# Patient Record
Sex: MALE | Race: BLACK OR AFRICAN AMERICAN | NOT HISPANIC OR LATINO | Employment: OTHER | URBAN - METROPOLITAN AREA
[De-identification: names, ages, dates, MRNs, and addresses within clinical notes are randomized per-mention and may not be internally consistent; named-entity substitution may affect disease eponyms.]

---

## 2023-10-06 PROBLEM — R76.8 HEPATITIS C ANTIBODY TEST POSITIVE: Status: ACTIVE | Noted: 2023-10-06

## 2023-10-06 PROBLEM — F20.9 SCHIZOPHRENIA: Status: ACTIVE | Noted: 2023-10-06

## 2023-10-06 PROBLEM — Z72.0 TOBACCO ABUSE: Status: ACTIVE | Noted: 2023-10-06

## 2023-10-07 PROBLEM — I11.9 LVH (LEFT VENTRICULAR HYPERTROPHY) DUE TO HYPERTENSIVE DISEASE, WITHOUT HEART FAILURE: Status: ACTIVE | Noted: 2023-10-07

## 2024-02-06 PROBLEM — E87.4 RESPIRATORY ACIDOSIS WITH METABOLIC ACIDOSIS: Status: ACTIVE | Noted: 2024-02-06

## 2024-02-06 PROBLEM — N17.9 AKI (ACUTE KIDNEY INJURY): Status: ACTIVE | Noted: 2024-02-06

## 2024-02-06 PROBLEM — R79.89 ELEVATED TROPONIN: Status: ACTIVE | Noted: 2024-02-06

## 2024-02-06 PROBLEM — G93.40 ACUTE ENCEPHALOPATHY: Status: ACTIVE | Noted: 2024-02-06

## 2024-02-06 PROBLEM — R79.89 ELEVATED LACTIC ACID LEVEL: Status: ACTIVE | Noted: 2024-02-06

## 2024-02-06 PROBLEM — J96.02 ACUTE RESPIRATORY FAILURE WITH HYPERCAPNIA: Status: ACTIVE | Noted: 2024-02-06

## 2024-02-06 PROBLEM — Z99.11 ON MECHANICALLY ASSISTED VENTILATION: Status: ACTIVE | Noted: 2024-02-06

## 2024-02-07 ENCOUNTER — DOCUMENTATION ONLY (OUTPATIENT)
Dept: NEUROLOGY | Facility: CLINIC | Age: 44
End: 2024-02-07
Payer: MEDICAID

## 2024-02-07 ENCOUNTER — HOSPITAL ENCOUNTER (INPATIENT)
Facility: HOSPITAL | Age: 44
LOS: 6 days | Discharge: HOME | DRG: 091 | End: 2024-02-13
Attending: PSYCHIATRY & NEUROLOGY | Admitting: PSYCHIATRY & NEUROLOGY
Payer: MEDICAID

## 2024-02-07 DIAGNOSIS — R41.82 AMS (ALTERED MENTAL STATUS): ICD-10-CM

## 2024-02-07 DIAGNOSIS — I24.9 ACUTE CORONARY SYNDROME WITH HIGH TROPONIN: ICD-10-CM

## 2024-02-07 DIAGNOSIS — F20.3 UNDIFFERENTIATED SCHIZOPHRENIA: ICD-10-CM

## 2024-02-07 DIAGNOSIS — G93.40 ACUTE ENCEPHALOPATHY: Primary | ICD-10-CM

## 2024-02-07 PROBLEM — R19.30: Status: ACTIVE | Noted: 2024-02-07

## 2024-02-07 PROBLEM — R56.9 SEIZURE: Status: ACTIVE | Noted: 2024-02-07

## 2024-02-07 PROBLEM — J96.01 ACUTE HYPOXEMIC RESPIRATORY FAILURE: Status: ACTIVE | Noted: 2024-02-07

## 2024-02-07 LAB
ABO + RH BLD: NORMAL
ALBUMIN SERPL BCP-MCNC: 3.3 G/DL (ref 3.5–5.2)
ALBUMIN SERPL BCP-MCNC: 3.7 G/DL (ref 3.5–5.2)
ALLENS TEST: ABNORMAL
ALP SERPL-CCNC: 54 U/L (ref 55–135)
ALP SERPL-CCNC: 62 U/L (ref 55–135)
ALT SERPL W/O P-5'-P-CCNC: 17 U/L (ref 10–44)
ALT SERPL W/O P-5'-P-CCNC: 21 U/L (ref 10–44)
AMPHET+METHAMPHET UR QL: NEGATIVE
ANION GAP SERPL CALC-SCNC: 10 MMOL/L (ref 8–16)
ANION GAP SERPL CALC-SCNC: 13 MMOL/L (ref 8–16)
ASCENDING AORTA: 2.72 CM
AST SERPL-CCNC: 37 U/L (ref 10–40)
AST SERPL-CCNC: 40 U/L (ref 10–40)
BACTERIA #/AREA URNS AUTO: ABNORMAL /HPF
BARBITURATES UR QL SCN>200 NG/ML: NEGATIVE
BASOPHILS # BLD AUTO: 0.03 K/UL (ref 0–0.2)
BASOPHILS NFR BLD: 0.3 % (ref 0–1.9)
BENZODIAZ UR QL SCN>200 NG/ML: ABNORMAL
BILIRUB SERPL-MCNC: 0.5 MG/DL (ref 0.1–1)
BILIRUB SERPL-MCNC: 0.6 MG/DL (ref 0.1–1)
BILIRUB UR QL STRIP: NEGATIVE
BLD GP AB SCN CELLS X3 SERPL QL: NORMAL
BSA FOR ECHO PROCEDURE: 2.3 M2
BUN SERPL-MCNC: 20 MG/DL (ref 6–20)
BUN SERPL-MCNC: 20 MG/DL (ref 6–20)
BZE UR QL SCN: NEGATIVE
CALCIUM SERPL-MCNC: 8.9 MG/DL (ref 8.7–10.5)
CALCIUM SERPL-MCNC: 8.9 MG/DL (ref 8.7–10.5)
CANNABINOIDS UR QL SCN: NEGATIVE
CHLORIDE SERPL-SCNC: 105 MMOL/L (ref 95–110)
CHLORIDE SERPL-SCNC: 107 MMOL/L (ref 95–110)
CHOLEST SERPL-MCNC: 199 MG/DL (ref 120–199)
CHOLEST/HDLC SERPL: 5.9 {RATIO} (ref 2–5)
CLARITY UR REFRACT.AUTO: ABNORMAL
CO2 SERPL-SCNC: 19 MMOL/L (ref 23–29)
CO2 SERPL-SCNC: 25 MMOL/L (ref 23–29)
COLOR UR AUTO: YELLOW
CREAT SERPL-MCNC: 1.3 MG/DL (ref 0.5–1.4)
CREAT SERPL-MCNC: 1.7 MG/DL (ref 0.5–1.4)
CREAT UR-MCNC: 167 MG/DL (ref 23–375)
CREAT UR-MCNC: 168 MG/DL (ref 23–375)
CREAT UR-MCNC: 168 MG/DL (ref 23–375)
CRP SERPL-MCNC: 17.5 MG/L (ref 0–8.2)
CV ECHO LV RWT: 0.53 CM
DELSYS: ABNORMAL
DIFFERENTIAL METHOD BLD: ABNORMAL
DOP CALC LVOT AREA: 3 CM2
DOP CALC LVOT DIAMETER: 1.95 CM
DOP CALC LVOT PEAK VEL: 0.92 M/S
DOP CALC LVOT STROKE VOLUME: 45.43 CM3
DOP CALCLVOT PEAK VEL VTI: 15.22 CM
E WAVE DECELERATION TIME: 241.58 MSEC
E/A RATIO: 1.43
E/E' RATIO: 8.35 M/S
ECHO LV POSTERIOR WALL: 1.26 CM (ref 0.6–1.1)
EJECTION FRACTION: 58 %
EOSINOPHIL # BLD AUTO: 0 K/UL (ref 0–0.5)
EOSINOPHIL NFR BLD: 0.3 % (ref 0–8)
ERYTHROCYTE [DISTWIDTH] IN BLOOD BY AUTOMATED COUNT: 13.2 % (ref 11.5–14.5)
ERYTHROCYTE [SEDIMENTATION RATE] IN BLOOD BY PHOTOMETRIC METHOD: 11 MM/HR (ref 0–23)
ERYTHROCYTE [SEDIMENTATION RATE] IN BLOOD BY WESTERGREN METHOD: 20 MM/H
EST. GFR  (NO RACE VARIABLE): 50.7 ML/MIN/1.73 M^2
EST. GFR  (NO RACE VARIABLE): >60 ML/MIN/1.73 M^2
ESTIMATED AVG GLUCOSE: 128 MG/DL (ref 68–131)
ETHANOL UR-MCNC: <10 MG/DL
FENTANYL UR QL SCN: ABNORMAL
FIO2: 40
FRACTIONAL SHORTENING: 31 % (ref 28–44)
GLUCOSE SERPL-MCNC: 105 MG/DL (ref 70–110)
GLUCOSE SERPL-MCNC: 96 MG/DL (ref 70–110)
GLUCOSE UR QL STRIP: NEGATIVE
HBA1C MFR BLD: 6.1 % (ref 4–5.6)
HCO3 UR-SCNC: 23.2 MMOL/L (ref 24–28)
HCT VFR BLD AUTO: 39.5 % (ref 40–54)
HDLC SERPL-MCNC: 34 MG/DL (ref 40–75)
HDLC SERPL: 17.1 % (ref 20–50)
HGB BLD-MCNC: 12.8 G/DL (ref 14–18)
HGB UR QL STRIP: ABNORMAL
IMM GRANULOCYTES # BLD AUTO: 0.08 K/UL (ref 0–0.04)
IMM GRANULOCYTES NFR BLD AUTO: 0.8 % (ref 0–0.5)
INTERVENTRICULAR SEPTUM: 0.89 CM (ref 0.6–1.1)
KETONES UR QL STRIP: NEGATIVE
LA MAJOR: 6.42 CM
LA MINOR: 6.08 CM
LA WIDTH: 4.24 CM
LACTATE SERPL-SCNC: 2.6 MMOL/L (ref 0.5–2.2)
LACTATE SERPL-SCNC: 4.3 MMOL/L (ref 0.5–2.2)
LDLC SERPL CALC-MCNC: 140 MG/DL (ref 63–159)
LEFT ATRIUM SIZE: 3.25 CM
LEFT ATRIUM VOLUME INDEX: 32.4 ML/M2
LEFT ATRIUM VOLUME: 73.15 CM3
LEFT INTERNAL DIMENSION IN SYSTOLE: 3.27 CM (ref 2.1–4)
LEFT VENTRICLE DIASTOLIC VOLUME INDEX: 46.69 ML/M2
LEFT VENTRICLE DIASTOLIC VOLUME: 105.53 ML
LEFT VENTRICLE MASS INDEX: 82 G/M2
LEFT VENTRICLE SYSTOLIC VOLUME INDEX: 19.1 ML/M2
LEFT VENTRICLE SYSTOLIC VOLUME: 43.14 ML
LEFT VENTRICULAR INTERNAL DIMENSION IN DIASTOLE: 4.76 CM (ref 3.5–6)
LEFT VENTRICULAR MASS: 185.38 G
LEUKOCYTE ESTERASE UR QL STRIP: NEGATIVE
LV LATERAL E/E' RATIO: 6.86 M/S
LV SEPTAL E/E' RATIO: 10.67 M/S
LYMPHOCYTES # BLD AUTO: 1.5 K/UL (ref 1–4.8)
LYMPHOCYTES NFR BLD: 14.9 % (ref 18–48)
MAGNESIUM SERPL-MCNC: 1.8 MG/DL (ref 1.6–2.6)
MCH RBC QN AUTO: 28.6 PG (ref 27–31)
MCHC RBC AUTO-ENTMCNC: 32.4 G/DL (ref 32–36)
MCV RBC AUTO: 88 FL (ref 82–98)
METHADONE UR QL SCN>300 NG/ML: NEGATIVE
MICROSCOPIC COMMENT: ABNORMAL
MIN VOL: 8.21
MODE: ABNORMAL
MONOCYTES # BLD AUTO: 1.7 K/UL (ref 0.3–1)
MONOCYTES NFR BLD: 16.6 % (ref 4–15)
MV PEAK A VEL: 0.67 M/S
MV PEAK E VEL: 0.96 M/S
MV STENOSIS PRESSURE HALF TIME: 70.06 MS
MV VALVE AREA P 1/2 METHOD: 3.14 CM2
NEUTROPHILS # BLD AUTO: 6.8 K/UL (ref 1.8–7.7)
NEUTROPHILS NFR BLD: 67.1 % (ref 38–73)
NITRITE UR QL STRIP: NEGATIVE
NONHDLC SERPL-MCNC: 165 MG/DL
NRBC BLD-RTO: 0 /100 WBC
OHS QRS DURATION: 94 MS
OHS QTC CALCULATION: 412 MS
OPIATES UR QL SCN: ABNORMAL
OXYCODONE UR QL SCN: NEGATIVE
PCO2 BLDA: 40.7 MMHG (ref 35–45)
PCP UR QL SCN>25 NG/ML: NEGATIVE
PEEP: 5
PH SMN: 7.36 [PH] (ref 7.35–7.45)
PH UR STRIP: 5 [PH] (ref 5–8)
PHOSPHATE SERPL-MCNC: 3.4 MG/DL (ref 2.7–4.5)
PIP: 22
PISA TR MAX VEL: 2.56 M/S
PLATELET # BLD AUTO: 301 K/UL (ref 150–450)
PMV BLD AUTO: 9.4 FL (ref 9.2–12.9)
PO2 BLDA: 126 MMHG (ref 80–100)
POC BE: -2 MMOL/L
POC SATURATED O2: 99 % (ref 95–100)
POC TCO2: 24 MMOL/L (ref 23–27)
POCT GLUCOSE: 102 MG/DL (ref 70–110)
POCT GLUCOSE: 85 MG/DL (ref 70–110)
POCT GLUCOSE: 94 MG/DL (ref 70–110)
POTASSIUM SERPL-SCNC: 4 MMOL/L (ref 3.5–5.1)
POTASSIUM SERPL-SCNC: 5.1 MMOL/L (ref 3.5–5.1)
PROT SERPL-MCNC: 6.4 G/DL (ref 6–8.4)
PROT SERPL-MCNC: 7.1 G/DL (ref 6–8.4)
PROT UR QL STRIP: NEGATIVE
RA MAJOR: 4.47 CM
RA WIDTH: 3.95 CM
RBC # BLD AUTO: 4.48 M/UL (ref 4.6–6.2)
RBC #/AREA URNS AUTO: 6 /HPF (ref 0–4)
RIGHT VENTRICULAR END-DIASTOLIC DIMENSION: 3.76 CM
SALICYLATES SERPL-MCNC: <5 MG/DL (ref 15–30)
SAMPLE: ABNORMAL
SINUS: 2.66 CM
SITE: ABNORMAL
SODIUM SERPL-SCNC: 139 MMOL/L (ref 136–145)
SODIUM SERPL-SCNC: 140 MMOL/L (ref 136–145)
SP GR UR STRIP: 1.02 (ref 1–1.03)
SP02: 100
SPECIMEN OUTDATE: NORMAL
STJ: 2.48 CM
TDI LATERAL: 0.14 M/S
TDI SEPTAL: 0.09 M/S
TDI: 0.12 M/S
TOXICOLOGY INFORMATION: ABNORMAL
TR MAX PG: 26 MMHG
TRICUSPID ANNULAR PLANE SYSTOLIC EXCURSION: 2.34 CM
TRIGL SERPL-MCNC: 125 MG/DL (ref 30–150)
TROPONIN I SERPL DL<=0.01 NG/ML-MCNC: 0.72 NG/ML (ref 0–0.03)
TROPONIN I SERPL DL<=0.01 NG/ML-MCNC: 0.81 NG/ML (ref 0–0.03)
TROPONIN I SERPL DL<=0.01 NG/ML-MCNC: 0.82 NG/ML (ref 0–0.03)
TROPONIN I SERPL DL<=0.01 NG/ML-MCNC: 0.82 NG/ML (ref 0–0.03)
URATE CRY UR QL COMP ASSIST: ABNORMAL
URN SPEC COLLECT METH UR: ABNORMAL
VT: 450
WBC # BLD AUTO: 10.13 K/UL (ref 3.9–12.7)
WBC #/AREA URNS AUTO: 2 /HPF (ref 0–5)
Z-SCORE OF LEFT VENTRICULAR DIMENSION IN END DIASTOLE: -5.55
Z-SCORE OF LEFT VENTRICULAR DIMENSION IN END SYSTOLE: -3.38

## 2024-02-07 PROCEDURE — 84484 ASSAY OF TROPONIN QUANT: CPT | Mod: 91

## 2024-02-07 PROCEDURE — 5A1945Z RESPIRATORY VENTILATION, 24-96 CONSECUTIVE HOURS: ICD-10-PCS | Performed by: PSYCHIATRY & NEUROLOGY

## 2024-02-07 PROCEDURE — 63600175 PHARM REV CODE 636 W HCPCS

## 2024-02-07 PROCEDURE — 94761 N-INVAS EAR/PLS OXIMETRY MLT: CPT | Mod: XB

## 2024-02-07 PROCEDURE — 94002 VENT MGMT INPAT INIT DAY: CPT

## 2024-02-07 PROCEDURE — 83605 ASSAY OF LACTIC ACID: CPT | Mod: 91 | Performed by: STUDENT IN AN ORGANIZED HEALTH CARE EDUCATION/TRAINING PROGRAM

## 2024-02-07 PROCEDURE — 84100 ASSAY OF PHOSPHORUS: CPT | Mod: 91 | Performed by: STUDENT IN AN ORGANIZED HEALTH CARE EDUCATION/TRAINING PROGRAM

## 2024-02-07 PROCEDURE — 82803 BLOOD GASES ANY COMBINATION: CPT

## 2024-02-07 PROCEDURE — 85025 COMPLETE CBC W/AUTO DIFF WBC: CPT | Mod: 91 | Performed by: PSYCHIATRY & NEUROLOGY

## 2024-02-07 PROCEDURE — 93010 ELECTROCARDIOGRAM REPORT: CPT | Mod: ,,, | Performed by: INTERNAL MEDICINE

## 2024-02-07 PROCEDURE — 25000003 PHARM REV CODE 250

## 2024-02-07 PROCEDURE — 85652 RBC SED RATE AUTOMATED: CPT | Performed by: STUDENT IN AN ORGANIZED HEALTH CARE EDUCATION/TRAINING PROGRAM

## 2024-02-07 PROCEDURE — 80179 DRUG ASSAY SALICYLATE: CPT

## 2024-02-07 PROCEDURE — 36620 INSERTION CATHETER ARTERY: CPT | Mod: ,,, | Performed by: PSYCHIATRY & NEUROLOGY

## 2024-02-07 PROCEDURE — 82552 ASSAY OF CPK IN BLOOD: CPT | Performed by: STUDENT IN AN ORGANIZED HEALTH CARE EDUCATION/TRAINING PROGRAM

## 2024-02-07 PROCEDURE — 36415 COLL VENOUS BLD VENIPUNCTURE: CPT | Performed by: PSYCHIATRY & NEUROLOGY

## 2024-02-07 PROCEDURE — 83605 ASSAY OF LACTIC ACID: CPT | Mod: 91

## 2024-02-07 PROCEDURE — 80061 LIPID PANEL: CPT | Performed by: PSYCHIATRY & NEUROLOGY

## 2024-02-07 PROCEDURE — 20000000 HC ICU ROOM

## 2024-02-07 PROCEDURE — 99900035 HC TECH TIME PER 15 MIN (STAT)

## 2024-02-07 PROCEDURE — 95720 EEG PHY/QHP EA INCR W/VEEG: CPT | Mod: ,,, | Performed by: PSYCHIATRY & NEUROLOGY

## 2024-02-07 PROCEDURE — 27100171 HC OXYGEN HIGH FLOW UP TO 24 HOURS

## 2024-02-07 PROCEDURE — 86140 C-REACTIVE PROTEIN: CPT | Performed by: STUDENT IN AN ORGANIZED HEALTH CARE EDUCATION/TRAINING PROGRAM

## 2024-02-07 PROCEDURE — 80053 COMPREHEN METABOLIC PANEL: CPT | Mod: 91 | Performed by: STUDENT IN AN ORGANIZED HEALTH CARE EDUCATION/TRAINING PROGRAM

## 2024-02-07 PROCEDURE — 83036 HEMOGLOBIN GLYCOSYLATED A1C: CPT | Performed by: PSYCHIATRY & NEUROLOGY

## 2024-02-07 PROCEDURE — 84484 ASSAY OF TROPONIN QUANT: CPT | Mod: 91 | Performed by: STUDENT IN AN ORGANIZED HEALTH CARE EDUCATION/TRAINING PROGRAM

## 2024-02-07 PROCEDURE — 99900026 HC AIRWAY MAINTENANCE (STAT)

## 2024-02-07 PROCEDURE — 80053 COMPREHEN METABOLIC PANEL: CPT | Mod: 91

## 2024-02-07 PROCEDURE — 37799 UNLISTED PX VASCULAR SURGERY: CPT

## 2024-02-07 PROCEDURE — 80365 DRUG SCREENING OXYCODONE: CPT

## 2024-02-07 PROCEDURE — 95714 VEEG EA 12-26 HR UNMNTR: CPT

## 2024-02-07 PROCEDURE — 80354 DRUG SCREENING FENTANYL: CPT

## 2024-02-07 PROCEDURE — 87040 BLOOD CULTURE FOR BACTERIA: CPT

## 2024-02-07 PROCEDURE — 83735 ASSAY OF MAGNESIUM: CPT | Mod: 91 | Performed by: STUDENT IN AN ORGANIZED HEALTH CARE EDUCATION/TRAINING PROGRAM

## 2024-02-07 PROCEDURE — 86901 BLOOD TYPING SEROLOGIC RH(D): CPT | Performed by: PSYCHIATRY & NEUROLOGY

## 2024-02-07 PROCEDURE — 81001 URINALYSIS AUTO W/SCOPE: CPT | Mod: XB | Performed by: STUDENT IN AN ORGANIZED HEALTH CARE EDUCATION/TRAINING PROGRAM

## 2024-02-07 PROCEDURE — 25000003 PHARM REV CODE 250: Performed by: PSYCHIATRY & NEUROLOGY

## 2024-02-07 PROCEDURE — 80307 DRUG TEST PRSMV CHEM ANLYZR: CPT | Performed by: PSYCHIATRY & NEUROLOGY

## 2024-02-07 PROCEDURE — 93005 ELECTROCARDIOGRAM TRACING: CPT

## 2024-02-07 PROCEDURE — 99291 CRITICAL CARE FIRST HOUR: CPT | Mod: 25,,, | Performed by: PSYCHIATRY & NEUROLOGY

## 2024-02-07 PROCEDURE — 95700 EEG CONT REC W/VID EEG TECH: CPT

## 2024-02-07 RX ORDER — CLONIDINE HYDROCHLORIDE 0.1 MG/1
0.1 TABLET ORAL 2 TIMES DAILY
Status: DISCONTINUED | OUTPATIENT
Start: 2024-02-07 | End: 2024-02-07

## 2024-02-07 RX ORDER — SODIUM,POTASSIUM PHOSPHATES 280-250MG
2 POWDER IN PACKET (EA) ORAL
Status: DISCONTINUED | OUTPATIENT
Start: 2024-02-07 | End: 2024-02-13 | Stop reason: HOSPADM

## 2024-02-07 RX ORDER — DEXMEDETOMIDINE HYDROCHLORIDE 4 UG/ML
0-1.4 INJECTION, SOLUTION INTRAVENOUS CONTINUOUS
Status: DISCONTINUED | OUTPATIENT
Start: 2024-02-07 | End: 2024-02-09

## 2024-02-07 RX ORDER — INSULIN ASPART 100 [IU]/ML
0-5 INJECTION, SOLUTION INTRAVENOUS; SUBCUTANEOUS
Status: DISCONTINUED | OUTPATIENT
Start: 2024-02-07 | End: 2024-02-09

## 2024-02-07 RX ORDER — IBUPROFEN 200 MG
16 TABLET ORAL
Status: DISCONTINUED | OUTPATIENT
Start: 2024-02-07 | End: 2024-02-09

## 2024-02-07 RX ORDER — SODIUM CHLORIDE, SODIUM LACTATE, POTASSIUM CHLORIDE, CALCIUM CHLORIDE 600; 310; 30; 20 MG/100ML; MG/100ML; MG/100ML; MG/100ML
INJECTION, SOLUTION INTRAVENOUS CONTINUOUS
Status: DISCONTINUED | OUTPATIENT
Start: 2024-02-07 | End: 2024-02-09

## 2024-02-07 RX ORDER — CLONIDINE HYDROCHLORIDE 0.1 MG/1
0.1 TABLET ORAL DAILY
Status: DISCONTINUED | OUTPATIENT
Start: 2024-02-07 | End: 2024-02-07

## 2024-02-07 RX ORDER — ONDANSETRON 8 MG/1
8 TABLET, ORALLY DISINTEGRATING ORAL EVERY 8 HOURS PRN
Status: DISCONTINUED | OUTPATIENT
Start: 2024-02-07 | End: 2024-02-07

## 2024-02-07 RX ORDER — PROPOFOL 10 MG/ML
0-50 INJECTION, EMULSION INTRAVENOUS CONTINUOUS
Status: DISCONTINUED | OUTPATIENT
Start: 2024-02-07 | End: 2024-02-08

## 2024-02-07 RX ORDER — ONDANSETRON 8 MG/1
8 TABLET, ORALLY DISINTEGRATING ORAL EVERY 8 HOURS PRN
Status: DISCONTINUED | OUTPATIENT
Start: 2024-02-07 | End: 2024-02-13 | Stop reason: HOSPADM

## 2024-02-07 RX ORDER — SODIUM CHLORIDE 0.9 % (FLUSH) 0.9 %
10 SYRINGE (ML) INJECTION
Status: DISCONTINUED | OUTPATIENT
Start: 2024-02-07 | End: 2024-02-07

## 2024-02-07 RX ORDER — OLANZAPINE 5 MG/1
15 TABLET ORAL 2 TIMES DAILY
Status: DISCONTINUED | OUTPATIENT
Start: 2024-02-07 | End: 2024-02-07

## 2024-02-07 RX ORDER — LANOLIN ALCOHOL/MO/W.PET/CERES
800 CREAM (GRAM) TOPICAL
Status: DISCONTINUED | OUTPATIENT
Start: 2024-02-07 | End: 2024-02-07

## 2024-02-07 RX ORDER — ACETAMINOPHEN 325 MG/1
650 TABLET ORAL EVERY 6 HOURS PRN
Status: DISCONTINUED | OUTPATIENT
Start: 2024-02-07 | End: 2024-02-13 | Stop reason: HOSPADM

## 2024-02-07 RX ORDER — SODIUM,POTASSIUM PHOSPHATES 280-250MG
2 POWDER IN PACKET (EA) ORAL
Status: DISCONTINUED | OUTPATIENT
Start: 2024-02-07 | End: 2024-02-07

## 2024-02-07 RX ORDER — GLUCAGON 1 MG
1 KIT INJECTION
Status: DISCONTINUED | OUTPATIENT
Start: 2024-02-07 | End: 2024-02-09

## 2024-02-07 RX ORDER — IBUPROFEN 200 MG
24 TABLET ORAL
Status: DISCONTINUED | OUTPATIENT
Start: 2024-02-07 | End: 2024-02-07

## 2024-02-07 RX ORDER — LEVETIRACETAM 500 MG/5ML
500 INJECTION, SOLUTION, CONCENTRATE INTRAVENOUS EVERY 12 HOURS
Status: DISCONTINUED | OUTPATIENT
Start: 2024-02-07 | End: 2024-02-08

## 2024-02-07 RX ORDER — LANOLIN ALCOHOL/MO/W.PET/CERES
800 CREAM (GRAM) TOPICAL
Status: DISCONTINUED | OUTPATIENT
Start: 2024-02-07 | End: 2024-02-13 | Stop reason: HOSPADM

## 2024-02-07 RX ORDER — IBUPROFEN 200 MG
16 TABLET ORAL
Status: DISCONTINUED | OUTPATIENT
Start: 2024-02-07 | End: 2024-02-07

## 2024-02-07 RX ORDER — HYDRALAZINE HYDROCHLORIDE 20 MG/ML
10 INJECTION INTRAMUSCULAR; INTRAVENOUS EVERY 6 HOURS PRN
Status: DISCONTINUED | OUTPATIENT
Start: 2024-02-07 | End: 2024-02-13 | Stop reason: HOSPADM

## 2024-02-07 RX ORDER — LABETALOL HCL 20 MG/4 ML
10 SYRINGE (ML) INTRAVENOUS EVERY 4 HOURS PRN
Status: DISCONTINUED | OUTPATIENT
Start: 2024-02-07 | End: 2024-02-13 | Stop reason: HOSPADM

## 2024-02-07 RX ORDER — CLONIDINE HYDROCHLORIDE 0.1 MG/1
0.1 TABLET ORAL DAILY
COMMUNITY
Start: 2024-01-25

## 2024-02-07 RX ORDER — PALIPERIDONE PALMITATE 156 MG/ML
156 INJECTION INTRAMUSCULAR
Status: ON HOLD | COMMUNITY
Start: 2024-01-17 | End: 2024-02-13 | Stop reason: HOSPADM

## 2024-02-07 RX ORDER — IBUPROFEN 200 MG
24 TABLET ORAL
Status: DISCONTINUED | OUTPATIENT
Start: 2024-02-07 | End: 2024-02-09

## 2024-02-07 RX ORDER — ACETAMINOPHEN 325 MG/1
650 TABLET ORAL EVERY 6 HOURS PRN
Status: DISCONTINUED | OUTPATIENT
Start: 2024-02-07 | End: 2024-02-07

## 2024-02-07 RX ORDER — DEXMEDETOMIDINE HYDROCHLORIDE 4 UG/ML
INJECTION, SOLUTION INTRAVENOUS
Status: COMPLETED
Start: 2024-02-07 | End: 2024-02-07

## 2024-02-07 RX ADMIN — PROPOFOL 40 MCG/KG/MIN: 10 INJECTION, EMULSION INTRAVENOUS at 07:02

## 2024-02-07 RX ADMIN — SODIUM CHLORIDE, POTASSIUM CHLORIDE, SODIUM LACTATE AND CALCIUM CHLORIDE: 600; 310; 30; 20 INJECTION, SOLUTION INTRAVENOUS at 09:02

## 2024-02-07 RX ADMIN — PROPOFOL 5 MCG/KG/MIN: 10 INJECTION, EMULSION INTRAVENOUS at 10:02

## 2024-02-07 RX ADMIN — PROPOFOL 50 MCG/KG/MIN: 10 INJECTION, EMULSION INTRAVENOUS at 05:02

## 2024-02-07 RX ADMIN — DEXMEDETOMIDINE HYDROCHLORIDE 0.2 MCG/KG/HR: 4 INJECTION INTRAVENOUS at 05:02

## 2024-02-07 RX ADMIN — SODIUM CHLORIDE, POTASSIUM CHLORIDE, SODIUM LACTATE AND CALCIUM CHLORIDE 1000 ML: 600; 310; 30; 20 INJECTION, SOLUTION INTRAVENOUS at 03:02

## 2024-02-07 RX ADMIN — LEVETIRACETAM 500 MG: 100 INJECTION INTRAVENOUS at 08:02

## 2024-02-07 RX ADMIN — HYDRALAZINE HYDROCHLORIDE 10 MG: 20 INJECTION, SOLUTION INTRAMUSCULAR; INTRAVENOUS at 09:02

## 2024-02-07 RX ADMIN — SODIUM CHLORIDE, POTASSIUM CHLORIDE, SODIUM LACTATE AND CALCIUM CHLORIDE 1000 ML: 600; 310; 30; 20 INJECTION, SOLUTION INTRAVENOUS at 08:02

## 2024-02-07 RX ADMIN — LABETALOL HYDROCHLORIDE 10 MG: 5 INJECTION, SOLUTION INTRAVENOUS at 10:02

## 2024-02-07 RX ADMIN — OLANZAPINE 15 MG: 5 TABLET, FILM COATED ORAL at 12:02

## 2024-02-07 RX ADMIN — ACETAMINOPHEN 650 MG: 325 TABLET ORAL at 05:02

## 2024-02-07 RX ADMIN — ACETAMINOPHEN 650 MG: 325 TABLET ORAL at 11:02

## 2024-02-07 RX ADMIN — CLONIDINE HYDROCHLORIDE 0.1 MG: 0.1 TABLET ORAL at 12:02

## 2024-02-07 RX ADMIN — PIPERACILLIN SODIUM AND TAZOBACTAM SODIUM 4.5 G: 4; .5 INJECTION, POWDER, FOR SOLUTION INTRAVENOUS at 09:02

## 2024-02-07 RX ADMIN — HYDRALAZINE HYDROCHLORIDE 10 MG: 20 INJECTION, SOLUTION INTRAMUSCULAR; INTRAVENOUS at 04:02

## 2024-02-07 RX ADMIN — PIPERACILLIN SODIUM AND TAZOBACTAM SODIUM 4.5 G: 4; .5 INJECTION, POWDER, FOR SOLUTION INTRAVENOUS at 03:02

## 2024-02-07 NOTE — SUBJECTIVE & OBJECTIVE
Past Medical History:   Diagnosis Date    Hypertension      Past Surgical History:   Procedure Laterality Date    APPENDECTOMY        Current Facility-Administered Medications on File Prior to Encounter   Medication Dose Route Frequency Provider Last Rate Last Admin    [COMPLETED] acetaminophen suppository 650 mg  650 mg Rectal ED 1 Time Antelmo Vasquez DO   650 mg at 02/06/24 2228    [COMPLETED] ceFEPIme (MAXIPIME) 2 g in dextrose 5 % in water (D5W) 100 mL IVPB (MB+)  2 g Intravenous ED 1 Time Antelmo Vasquez DO   Stopped at 02/06/24 2257    [COMPLETED] etomidate injection 20 mg  20 mg Intravenous ED 1 Time Antelmo Vasquez DO   20 mg at 02/06/24 2058    [COMPLETED] fentaNYL 50 mcg/mL injection 100 mcg  100 mcg Intravenous ED 1 Time Antelmo Vasquez DO   100 mcg at 02/06/24 2114    [COMPLETED] levETIRAcetam (Keppra) 3,000 mg in dextrose 5 % (D5W) 250 mL IVPB  3,000 mg Intravenous ED 1 Time Antelmo Vasquez  mL/hr at 02/06/24 2323 3,000 mg at 02/06/24 2323    [COMPLETED] LORazepam injection 2 mg  2 mg Intravenous ED 1 Time Antelmo Vasquez DO   2 mg at 02/06/24 2233    [COMPLETED] propofol (DIPRIVAN) 10 mg/mL infusion  0-50 mcg/kg/min Intravenous ED 1 Time Antelmo Vasquez DO 20.2 mL/hr at 02/06/24 2105 32.5 mcg/kg/min at 02/06/24 2105    [COMPLETED] rocuronium injection 100 mg  100 mg Intravenous Once Antelmo Vasquez DO   100 mg at 02/06/24 2058    [COMPLETED] sodium chloride 0.9% bolus 1,000 mL 1,000 mL  1,000 mL Intravenous ED 1 Time Antelmo Vasquez DO   Stopped at 02/06/24 2215    [COMPLETED] sodium chloride 0.9% bolus 1,000 mL 1,000 mL  1,000 mL Intravenous ED 1 Time Antelmo Vasquez DO   Stopped at 02/06/24 2356    [COMPLETED] vancomycin 1,500 mg in dextrose 5 % (D5W) 250 mL IVPB (Vial-Mate)  15 mg/kg Intravenous Once Antelmo Vasquez DO   Stopped at 02/07/24 0100    [DISCONTINUED] enoxaparin injection 40 mg  40 mg Subcutaneous Daily Sonya  Kristine TORRES MD        [DISCONTINUED] fentaNYL 50 mcg/mL injection 25 mcg  25 mcg Intravenous Q1H PRN Krystle Naqvi MD        [DISCONTINUED] heparin (porcine) injection 5,000 Units  5,000 Units Subcutaneous Q8H Kristine Hassan MD   5,000 Units at 02/07/24 0508    [DISCONTINUED] lactated ringers infusion   Intravenous Continuous Kristine Hassan MD   Paused at 02/07/24 0701    [DISCONTINUED] levETIRAcetam injection 1,000 mg  1,000 mg Intravenous Q12H Kristine Hassan MD        [DISCONTINUED] levETIRAcetam injection 1,000 mg  1,000 mg Intravenous Q12H Kristine Hassan MD        [DISCONTINUED] midazolam in dextrose 5 % infusion  0-5 mg/hr Intravenous Continuous Krystle Naqvi MD 1 mL/hr at 02/07/24 0701 1 mg/hr at 02/07/24 0701    [DISCONTINUED] niCARdipine 40 mg/200 mL (0.2 mg/mL) infusion  0-15 mg/hr Intravenous Continuous Antelmo Vasquez DO   Stopped at 02/06/24 2335    [DISCONTINUED] piperacillin-tazobactam (ZOSYN) 4.5 g in dextrose 5 % in water (D5W) 100 mL IVPB (MB+)  4.5 g Intravenous Q8H Kristine Hassan MD 25 mL/hr at 02/07/24 0624 Rate Verify at 02/07/24 0624    [DISCONTINUED] propofol (DIPRIVAN) 10 mg/mL infusion  0-50 mcg/kg/min Intravenous Continuous Sunkara, Pallavi, MD 24.9 mL/hr at 02/07/24 0701 40 mcg/kg/min at 02/07/24 0701    [DISCONTINUED] sodium chloride 0.9% flush 10 mL  10 mL Intravenous PRN Kristine Hassan MD        [DISCONTINUED] vancomycin - pharmacy to dose   Intravenous pharmacy to manage frequency Kristine Hassan MD         Current Outpatient Medications on File Prior to Encounter   Medication Sig Dispense Refill    cloNIDine (CATAPRES) 0.1 MG tablet Take 0.1 mg by mouth once daily.      INVEGA SUSTENNA 156 mg/mL Syrg injection Inject 156 mg into the muscle every 28 days.      [DISCONTINUED] hydroCHLOROthiazide (HYDRODIURIL) 12.5 MG Tab Take 1 tablet (12.5 mg total) by mouth once daily. 30 tablet 2    [DISCONTINUED] NIFEdipine (PROCARDIA-XL) 60 MG (OSM) 24 hr tablet  Take 1 tablet (60 mg total) by mouth once daily. 30 tablet 2    [DISCONTINUED] OLANZapine (ZYPREXA) 15 MG Tab Take 1 tablet (15 mg total) by mouth 2 (two) times a day. 60 tablet 1      Allergies: Patient has no known allergies.  No family history on file.  Social History     Tobacco Use    Smoking status: Every Day     Current packs/day: 1.00     Average packs/day: 1 pack/day for 27.4 years (27.4 ttl pk-yrs)     Types: Cigarettes     Start date: 10/1/1996    Smokeless tobacco: Never   Substance Use Topics    Alcohol use: Never    Drug use: Never       Objective:     Vitals:    Temp: (!) 101.7 °F (38.7 °C)  Pulse: 92  BP: (!) 158/69  MAP (mmHg): 112  CVP (mean): 7 mmHg  Resp: 17  SpO2: 100 %  Oxygen Concentration (%): 40  Vent Mode: A/C  Set Rate: 16 BPM  Vt Set: 450 mL  Pressure Support: 5 cmH20  PEEP/CPAP: 5 cmH20  Peak Airway Pressure: 21 cmH20  Mean Airway Pressure: 8.4 cmH20  Plateau Pressure: 0 cmH20    Temp  Min: 91.4 °F (33 °C)  Max: 101.7 °F (38.7 °C)  Pulse  Min: 85  Max: 166  BP  Min: 89/54  Max: 241/121  MAP (mmHg)  Min: 67  Max: 168  CVP (mean)  Min: 7 mmHg  Max: 7 mmHg  Resp  Min: 13  Max: 35  ETCO2 (mmHg)  Min: 34 mmHg  Max: 67 mmHg  SpO2  Min: 90 %  Max: 100 %  Oxygen Concentration (%)  Min: 40  Max: 100    No intake/output data recorded.            Physical Exam  Vitals reviewed.   Constitutional:       Appearance: He is ill-appearing.   HENT:      Head: Normocephalic and atraumatic.      Right Ear: External ear normal.      Left Ear: External ear normal.      Nose: Nose normal.      Mouth/Throat:      Mouth: Mucous membranes are moist.   Eyes:      Pupils: Pupils are equal, round, and reactive to light.      Comments: Pupils pinpoint & brisk   Cardiovascular:      Rate and Rhythm: Normal rate and regular rhythm.      Pulses: Normal pulses.      Heart sounds: Normal heart sounds.   Pulmonary:      Effort: Pulmonary effort is normal.      Breath sounds: Normal breath sounds. No wheezing.       Comments: Intubated    Vent Mode: A/C  Oxygen Concentration (%):  [] 40  Resp Rate Total:  [16 br/min-34 br/min] 19 br/min  Vt Set:  [450 mL] 450 mL  PEEP/CPAP:  [3.5 cmH20-6.1 cmH20] 5 cmH20  Pressure Support:  [5 cmH20] 5 cmH20  Mean Airway Pressure:  [7.1 dgW52-55.7 cmH20] 8.4 cmH20      Abdominal:      General: Abdomen is flat. There is distension.      Palpations: Abdomen is soft.      Comments: Tense abdomen  Intraabdominal pressure 17  NG to suction, confirmed on XR   Musculoskeletal:         General: No swelling, tenderness or signs of injury. Normal range of motion.      Cervical back: Normal range of motion and neck supple. No rigidity.   Skin:     General: Skin is warm and dry.      Capillary Refill: Capillary refill takes less than 2 seconds.   Neurological:      Comments:   Mentation: Sedated on propofol  GCS: ([id number])  Brainstem reflexes intact  Triple flexion to pain BLE  No response to noxious stimuli BL upper extremities  Pupils pinpoint & brisk  Sensory: Not testable  Motor: NT  RUE NT  LUE NT  Coordination: NT  Gait: not tested for safety     Psychiatric:         Mood and Affect: Mood normal.            Today I personally reviewed pertinent medications, lines/drains/airways, imaging, laboratory results, notably:

## 2024-02-07 NOTE — ASSESSMENT & PLAN NOTE
SBP in 200s at [facility], SBP < 180  Off cardene gtt  Restart home clonidine  PRN labetalol, hydralazine

## 2024-02-07 NOTE — ASSESSMENT & PLAN NOTE
Mildly elevated troponin at [facility]  EKG just with nonspecific ST and T wave abnormality, suspect demand ischemia  Trend EKG and troponin q6h  Cardiology consult for uptrending troponin pending repeat ekg

## 2024-02-07 NOTE — PROGRESS NOTES
Pharmacokinetic Initial Assessment: IV Vancomycin    Assessment/Plan:    Initiate intravenous vancomycin with loading dose of 1500 mg once with subsequent doses when random concentrations are less than 20 mcg/mL  Desired empiric serum trough concentration is 15 to 20 mcg/mL  Draw vancomycin random level on 02/07/2024 at 2300.  Pharmacy will continue to follow and monitor vancomycin.      Please contact pharmacy at extension [phone] with any questions regarding this assessment.     Thank you for the consult,   Lamin MartinezD       Patient brief summary:  Nick Moffett Jr is a 43 y.o. male initiated on antimicrobial therapy with IV Vancomycin for treatment of suspected  Acute encephalopathy    Drug Allergies:   Review of patient's allergies indicates:  No Known Allergies    Actual Body Weight:   103.8 kg    Renal Function:   Estimated Creatinine Clearance: 69.8 mL/min (A) (based on SCr of 1.7 mg/dL (H)).,       CBC (last 72 hours):  Recent Labs   Lab Result Units 02/06/24 2110 02/07/24 0542 02/07/24  0900 02/07/24  1014   WBC K/uL 20.09* 9.69  --  10.13   Hemoglobin g/dL 15.1 12.8*  --  12.8*   Hemoglobin A1C %  --   --  6.1*  --    Hematocrit % 48.2 38.8*  --  39.5*   Platelets K/uL 419 368  --  301   Gran % % 72.3 72.9  --  67.1   Lymph % % 13.2* 11.7*  --  14.9*   Mono % % 11.0 13.9  --  16.6*   Eosinophil % % 1.0 0.2  --  0.3   Basophil % % 0.4 0.3  --  0.3   Differential Method  Automated Automated  --  Automated       Metabolic Panel (last 72 hours):  Recent Labs   Lab Result Units 02/06/24 2110 02/06/24 2116 02/06/24 2127 02/07/24  0542 02/07/24  0900   Sodium mmol/L 149*  --   --  142 140   Potassium mmol/L 4.5  --   --  4.8 5.1   Chloride mmol/L 104  --   --  110 105   CO2 mmol/L 19*  --   --  24 25   Glucose mg/dL 207*  --   --  107 96   Glucose, UA   --   --  Negative  --   --    BUN mg/dL 17  --   --  20 20   Creatinine mg/dL 1.62*  --   --  1.29 1.7*   Creatinine, Urine mg/dL  --  93.9  --   --   " --    Albumin g/dL 5.2  --   --  3.9 3.7   Total Bilirubin mg/dL 0.3  --   --  0.4 0.5   Alkaline Phosphatase U/L 70  --   --  60 62   AST U/L 38  --   --  42 37   ALT U/L 32  --   --  23 21   Magnesium mg/dL 2.2  --   --  1.8 1.8   Phosphorus mg/dL  --   --   --  3.8 3.4       Drug levels (last 3 results):  No results for input(s): "VANCOMYCINRA", "VANCORANDOM", "VANCOMYCINPE", "VANCOPEAK", "VANCOMYCINTR", "VANCOTROUGH" in the last 72 hours.    Microbiologic Results:  Microbiology Results (last 7 days)       Procedure Component Value Units Date/Time    Blood culture [[id number]]     Order Status: Sent Specimen: Blood     Blood culture [[id number]]     Order Status: Sent Specimen: Blood             "

## 2024-02-07 NOTE — ASSESSMENT & PLAN NOTE
BUN 17, Cr 1.62 on admission  Hourly I/O  IVF  Daily CMP  Avoid nephrotoxic medications and renally dose as appropriate   Detail Level: Simple Consent: The patient's consent was obtained including but not limited to risks of crusting, scabbing, blistering, scarring, darker or lighter pigmentary change, recurrence, incomplete removal and infection. Number Of Freeze-Thaw Cycles: 2 freeze-thaw cycles Duration Of Freeze Thaw-Cycle (Seconds): 2 Render Note In Bullet Format When Appropriate: No Post-Care Instructions: I reviewed with the patient in detail post-care instructions. Patient is to wear sunprotection, and avoid picking at any of the treated lesions. Pt may apply Vaseline to crusted or scabbing areas.

## 2024-02-07 NOTE — HPI
"Mr. Nick Moffett . Is 43-year-old male with a PMHx of HTN and schizophrenia who presents to [facility] after being found down on the porch by family members with unknown down time. Upon EMS arrival, agonal respirations with frothy sputum and pinpoint pupils were noted. Narcan was administered and pupils reportedly midpoint but remained non-reactive. .  Agonal respirations frothy sputum noted at the mouth. He also appeared to be "clenching up," concerning for fever activity. He was brought to [facility] where he was intubated for airway protection after he was noted to be severely hypercarbic in the low 70s and loaded with 2 mg ativan and 3 g keppra. Utox (+) for benzos and opioids.  Tmax at [facility] at 100.8 with WBC 20. He was administered a one time dose of both vancomycin and cefepime but does not appear to have been cultured prior to transfer.   He was transferred to [facility] at [facility]  for hourly neuro-monitoring and a higher level of care.    "

## 2024-02-07 NOTE — ASSESSMENT & PLAN NOTE
Lactic acid level 11 at [facility], repeat 2.6, trending  Blood cx, sputum cx pending, broad spec abx  UA not concerning for UTI  Aggressive IVF resuscitation

## 2024-02-07 NOTE — ASSESSMENT & PLAN NOTE
Patient with Hypercapnic Respiratory failure which is Acute.  he is not on home oxygen. Supplemental oxygen was provided and noted- Vent Mode: A/C  Oxygen Concentration (%):  [] 40  Resp Rate Total:  [16 br/min-34 br/min] 19 br/min  Vt Set:  [450 mL] 450 mL  PEEP/CPAP:  [3.5 cmH20-6.1 cmH20] 5 cmH20  Pressure Support:  [5 cmH20] 5 cmH20  Mean Airway Pressure:  [7.1 hhX35-32.7 cmH20] 8.4 cmH20     Patient Has recent ABG, which has been reviewed. Will treat underlying causes and adjust management of respiratory failure as follows-     Trend ABG q6h and adjust settings as appropriate  Daily CXR

## 2024-02-07 NOTE — H&P
"[facility] - Neuro Critical Care  Neurocritical Care  History & Physical    Admit Date: 2/7/2024  Service Date: 02/07/2024  Length of Stay: 0    Subjective:     Chief Complaint: Acute encephalopathy    History of Present Illness: Mr. Nick Moffett  Is 43-year-old male with a PMHx of HTN and schizophrenia who presents to [facility] after being found down on the porch by family members with unknown down time. Upon EMS arrival, agonal respirations with frothy sputum and pinpoint pupils were noted. Narcan was administered and pupils reportedly midpoint but remained non-reactive. .  Agonal respirations frothy sputum noted at the mouth. He also appeared to be "clenching up," concerning for fever activity. He was brought to [facility] where he was intubated for airway protection after he was noted to be severely hypercarbic in the low 70s and loaded with 2 mg ativan and 3 g keppra. Utox (+) for benzos and opioids.  Tmax at [facility] at 100.8 with WBC 20. He was administered a one time dose of both vancomycin and cefepime but does not appear to have been cultured prior to transfer.   He was transferred to [facility] at [facility]  for hourly neuro-monitoring and a higher level of care.        Past Medical History:   Diagnosis Date    Hypertension      Past Surgical History:   Procedure Laterality Date    APPENDECTOMY        Current Facility-Administered Medications on File Prior to Encounter   Medication Dose Route Frequency Provider Last Rate Last Admin    [COMPLETED] acetaminophen suppository 650 mg  650 mg Rectal ED 1 Time Antelmo Vasquez, DO   650 mg at 02/06/24 2228    [COMPLETED] ceFEPIme (MAXIPIME) 2 g in dextrose 5 % in water (D5W) 100 mL IVPB (MB+)  2 g Intravenous ED 1 Time Antelmo Vasquez, DO   Stopped at 02/06/24 2257    [COMPLETED] etomidate injection 20 mg  20 mg Intravenous ED 1 Time Antelmo Vasquez, DO   20 mg at 02/06/24 2058    [COMPLETED] fentaNYL 50 mcg/mL injection 100 mcg  100 mcg Intravenous ED 1 Time Pedro" Antelmo VILLALOBOS DO   100 mcg at 02/06/24 2114    [COMPLETED] levETIRAcetam (Keppra) 3,000 mg in dextrose 5 % (D5W) 250 mL IVPB  3,000 mg Intravenous ED 1 Time Antelmo Vasquez,  mL/hr at 02/06/24 2323 3,000 mg at 02/06/24 2323    [COMPLETED] LORazepam injection 2 mg  2 mg Intravenous ED 1 Time Antelmo Vasquez DO   2 mg at 02/06/24 2233    [COMPLETED] propofol (DIPRIVAN) 10 mg/mL infusion  0-50 mcg/kg/min Intravenous ED 1 Time Antelmo Vasquez DO 20.2 mL/hr at 02/06/24 2105 32.5 mcg/kg/min at 02/06/24 2105    [COMPLETED] rocuronium injection 100 mg  100 mg Intravenous Once Antelmo Vasquez DO   100 mg at 02/06/24 2058    [COMPLETED] sodium chloride 0.9% bolus 1,000 mL 1,000 mL  1,000 mL Intravenous ED 1 Time Antelmo Vasquez DO   Stopped at 02/06/24 2215    [COMPLETED] sodium chloride 0.9% bolus 1,000 mL 1,000 mL  1,000 mL Intravenous ED 1 Time Antelmo Vasquez DO   Stopped at 02/06/24 2356    [COMPLETED] vancomycin 1,500 mg in dextrose 5 % (D5W) 250 mL IVPB (Vial-Mate)  15 mg/kg Intravenous Once Antelmo Vasquez DO   Stopped at 02/07/24 0100    [DISCONTINUED] enoxaparin injection 40 mg  40 mg Subcutaneous Daily Kristine Hassan MD        [DISCONTINUED] fentaNYL 50 mcg/mL injection 25 mcg  25 mcg Intravenous Q1H PRN Krystle Naqvi MD        [DISCONTINUED] heparin (porcine) injection 5,000 Units  5,000 Units Subcutaneous Q8H Kristine Hassan MD   5,000 Units at 02/07/24 0508    [DISCONTINUED] lactated ringers infusion   Intravenous Continuous Kristine Hassan MD   Paused at 02/07/24 0701    [DISCONTINUED] levETIRAcetam injection 1,000 mg  1,000 mg Intravenous Q12H Kristine Hassan MD        [DISCONTINUED] levETIRAcetam injection 1,000 mg  1,000 mg Intravenous Q12H Kristine Hassan MD        [DISCONTINUED] midazolam in dextrose 5 % infusion  0-5 mg/hr Intravenous Continuous Krystle Naqvi MD 1 mL/hr at 02/07/24 0701 1 mg/hr at 02/07/24 0701    [DISCONTINUED] niCARdipine  40 mg/200 mL (0.2 mg/mL) infusion  0-15 mg/hr Intravenous Continuous Antelmo Vasquez DO   Stopped at 02/06/24 1525    [DISCONTINUED] piperacillin-tazobactam (ZOSYN) 4.5 g in dextrose 5 % in water (D5W) 100 mL IVPB (MB+)  4.5 g Intravenous Q8H Kristine Hassan MD 25 mL/hr at 02/07/24 0624 Rate Verify at 02/07/24 0624    [DISCONTINUED] propofol (DIPRIVAN) 10 mg/mL infusion  0-50 mcg/kg/min Intravenous Continuous Sunkara, Pallavi, MD 24.9 mL/hr at 02/07/24 0701 40 mcg/kg/min at 02/07/24 0701    [DISCONTINUED] sodium chloride 0.9% flush 10 mL  10 mL Intravenous PRN Kristine Hassan MD        [DISCONTINUED] vancomycin - pharmacy to dose   Intravenous pharmacy to manage frequency Kristine Hassan MD         Current Outpatient Medications on File Prior to Encounter   Medication Sig Dispense Refill    cloNIDine (CATAPRES) 0.1 MG tablet Take 0.1 mg by mouth once daily.      INVEGA SUSTENNA 156 mg/mL Syrg injection Inject 156 mg into the muscle every 28 days.      [DISCONTINUED] hydroCHLOROthiazide (HYDRODIURIL) 12.5 MG Tab Take 1 tablet (12.5 mg total) by mouth once daily. 30 tablet 2    [DISCONTINUED] NIFEdipine (PROCARDIA-XL) 60 MG (OSM) 24 hr tablet Take 1 tablet (60 mg total) by mouth once daily. 30 tablet 2    [DISCONTINUED] OLANZapine (ZYPREXA) 15 MG Tab Take 1 tablet (15 mg total) by mouth 2 (two) times a day. 60 tablet 1      Allergies: Patient has no known allergies.  No family history on file.  Social History     Tobacco Use    Smoking status: Every Day     Current packs/day: 1.00     Average packs/day: 1 pack/day for 27.4 years (27.4 ttl pk-yrs)     Types: Cigarettes     Start date: 10/1/1996    Smokeless tobacco: Never   Substance Use Topics    Alcohol use: Never    Drug use: Never       Objective:     Vitals:    Temp: (!) 101.7 °F (38.7 °C)  Pulse: 92  BP: (!) 158/69  MAP (mmHg): 112  CVP (mean): 7 mmHg  Resp: 17  SpO2: 100 %  Oxygen Concentration (%): 40  Vent Mode: A/C  Set Rate: 16 BPM  Vt Set: 450  mL  Pressure Support: 5 cmH20  PEEP/CPAP: 5 cmH20  Peak Airway Pressure: 21 cmH20  Mean Airway Pressure: 8.4 cmH20  Plateau Pressure: 0 cmH20    Temp  Min: 91.4 °F (33 °C)  Max: 101.7 °F (38.7 °C)  Pulse  Min: 85  Max: 166  BP  Min: 89/54  Max: 241/121  MAP (mmHg)  Min: 67  Max: 168  CVP (mean)  Min: 7 mmHg  Max: 7 mmHg  Resp  Min: 13  Max: 35  ETCO2 (mmHg)  Min: 34 mmHg  Max: 67 mmHg  SpO2  Min: 90 %  Max: 100 %  Oxygen Concentration (%)  Min: 40  Max: 100    No intake/output data recorded.            Physical Exam  Vitals reviewed.   Constitutional:       Appearance: He is ill-appearing.   HENT:      Head: Normocephalic and atraumatic.      Right Ear: External ear normal.      Left Ear: External ear normal.      Nose: Nose normal.      Mouth/Throat:      Mouth: Mucous membranes are moist.   Eyes:      Pupils: Pupils are equal, round, and reactive to light.      Comments: Pupils pinpoint & brisk   Cardiovascular:      Rate and Rhythm: Normal rate and regular rhythm.      Pulses: Normal pulses.      Heart sounds: Normal heart sounds.   Pulmonary:      Effort: Pulmonary effort is normal.      Breath sounds: Normal breath sounds. No wheezing.      Comments: Intubated    Vent Mode: A/C  Oxygen Concentration (%):  [] 40  Resp Rate Total:  [16 br/min-34 br/min] 19 br/min  Vt Set:  [450 mL] 450 mL  PEEP/CPAP:  [3.5 cmH20-6.1 cmH20] 5 cmH20  Pressure Support:  [5 cmH20] 5 cmH20  Mean Airway Pressure:  [7.1 dbI71-24.7 cmH20] 8.4 cmH20      Abdominal:      General: Abdomen is flat. There is distension.      Palpations: Abdomen is soft.      Comments: Tense abdomen  Intraabdominal pressure 17  NG to suction, confirmed on XR   Musculoskeletal:         General: No swelling, tenderness or signs of injury. Normal range of motion.      Cervical back: Normal range of motion and neck supple. No rigidity.   Skin:     General: Skin is warm and dry.      Capillary Refill: Capillary refill takes less than 2 seconds.    Neurological:      Comments:   Mentation: Sedated on propofol  GCS: ([id number])  Brainstem reflexes intact  Triple flexion to pain BLE  No response to noxious stimuli BL upper extremities  Pupils pinpoint & brisk  Sensory: Not testable  Motor: NT  RUE NT  LUE NT  Coordination: NT  Gait: not tested for safety     Psychiatric:         Mood and Affect: Mood normal.            Today I personally reviewed pertinent medications, lines/drains/airways, imaging, laboratory results, notably:      Assessment/Plan:     Neuro  * Acute encephalopathy  44 y/o M found down after an unknown amount of time. Pupils fixed and pinpoint upon arrival to ED and became midpoint after narcan administration. Utox (+) opioids and benzos. OHS reported UE jerking for which he was given 2 mg ativan and 3 keppra prior to transfer.    -Admit to NCC  -Hourly neurochecks, vital checks  -Daily CBC, CMP, mag, phos  -SCDs, lovenox  -PT/OT/SLP as appropriate    Plan:   -Witnessed jerking at [facility] s/p benzo admin and keppra load  -Schedule keppra 1 g bid, and place EEG cap  -CT head unremarkable, consider MRI to rule out anoxic injury  -Febrile leukocytosis at [facility], pancx culture and initiate broad spectrum coverage   -BUN, lipase WNL  -Ammonia, amylase pending  -Correct the patient's multiple metabolic derangements including but not limited to lactic acidosis and hypercarbia  -Wean sedation as able       Psychiatric  Schizophrenia  Hx previous hospitalizations for exacerbations characterized by grandiose thinking, audiovisual hallucinations, +/- violent ideation    Per mother receives Invega Sustena injections q28 days d/t adherence issue with daily meds  Per chart review receives Invega Sustena as above + Zyprexa 15mg BID --> will restart inpatient    (Prior medication regimen: Geodon 60 daily, Zyprexa 50 daily, Seroquel 50 daily)    Pulmonary  On mechanically assisted ventilation  See respiratory failure    Acute respiratory failure with  hypercapnia  Patient with Hypercapnic Respiratory failure which is Acute.  he is not on home oxygen. Supplemental oxygen was provided and noted- Vent Mode: A/C  Oxygen Concentration (%):  [] 40  Resp Rate Total:  [16 br/min-34 br/min] 19 br/min  Vt Set:  [450 mL] 450 mL  PEEP/CPAP:  [3.5 cmH20-6.1 cmH20] 5 cmH20  Pressure Support:  [5 cmH20] 5 cmH20  Mean Airway Pressure:  [7.1 jkM82-42.7 cmH20] 8.4 cmH20     Patient Has recent ABG, which has been reviewed. Will treat underlying causes and adjust management of respiratory failure as follows-     Trend ABG q6h and adjust settings as appropriate  Daily CXR    Cardiac/Vascular  Elevated troponin  Mildly elevated troponin at [facility]  EKG just with nonspecific ST and T wave abnormality, suspect demand ischemia  Trend EKG and troponin q6h  Cardiology consult for uptrending troponin pending repeat ekg    LVH (left ventricular hypertrophy) due to hypertensive disease, without heart failure  Noted on EKG  ECHO pending    Hypertension  SBP in 200s at [facility], SBP < 180  Off cardene gtt  Restart home clonidine  PRN labetalol, hydralazine    Renal/  Respiratory acidosis with metabolic acidosis  Trend q6h with lactic   Adjust mechanical ventilation settings as appropriate  MAP > 65    Recent Labs     02/07/24  1132   PH 7.364   PCO2 40.7   PO2 126*   HCO3 23.2*   POCSATURATED 99   BE -2        FREDERICK (acute kidney injury)  BUN 17, Cr 1.62 on admission  Hourly I/O  IVF  Daily CMP  Avoid nephrotoxic medications and renally dose as appropriate    GI  Abdominal rigidity  Abdomen tense on exam, pt intubated/sedated tenderness difficult to assess. No discrete masses felt. NG placed, confirmed, to suction. Imaging pending    Hepatitis C antibody test positive  Noted Hep C Ab (+) 10/06/2023    Other  Elevated lactic acid level  Lactic acid level 11 at [facility], repeat 2.6, trending  Blood cx, sputum cx pending, broad spec abx  UA not concerning for UTI  Aggressive IVF resuscitation      Tobacco  abuse  Per chart review  Cessation counseling as appropriate          The patient is being Prophylaxed for:  Venous Thromboembolism with: Mechanical  Stress Ulcer with: None  Ventilator Pneumonia with: chlorhexidine oral care    Activity Orders            Progressive Mobility Protocol (mobilize patient to their highest level of functioning at least twice daily) starting at 02/07 2000          Full Code    Abdiel Finn MD  Neurocritical Care  [facility] - Neuro Critical Care

## 2024-02-07 NOTE — PLAN OF CARE
[facility] - Neuro Critical Care  Initial Discharge Assessment       Primary Care Provider: No, Primary Doctor    Admission Diagnosis: AMS (altered mental status) [R41.82]    Admission Date: 2/7/2024  Expected Discharge Date: 2/14/2024    Transition of Care Barriers: Underinsured, Mental illness    Payor: MEDICAID / Plan: [place]UE [place] / Product Type: Managed Medicaid /     Extended Emergency Contact Information  Primary Emergency Contact: Preeti Moffett  Mobile Phone: [phone]  Relation: Mother    Discharge Plan A: Home  Discharge Plan B: Home Health      [id number] - [place], [place] - [address]  [address]  [place] [address]  Phone: [phone] Fax: [phone]      Transferred from:   [facility]     Past Medical History:   Diagnosis Date    Hypertension          CM met with patient and Nick Sr Zuhair (father) [phone]  in room for Discharge Planning Assessment.  Patient is intubated and unable to answer questions.  Per father, the patient lives with Preeti Moffett (mother) [phone] in a single story house with 2 step(s) to enter.   Per father, the patient was independent with ADLS and used no dme for ambulation.  Patient will have assistance from his parents upon discharge.   Discharge Planning Booklet given to patient/family and discussed.  All questions addressed.  CM will follow for needs.      Discharge Plan A and Plan B have been determined by review of patient's clinical status, future medical and therapeutic needs, and coverage/benefits for post-acute care in coordination with multidisciplinary team members.      Initial Assessment (most recent)       Adult Discharge Assessment - 02/07/24 1619          Discharge Assessment    Assessment Type Discharge Planning Assessment     Confirmed/corrected address, phone number and insurance Yes     Confirmed Demographics Correct on Facesheet     Source of Information patient;unable to respond     If unable to  respond/provide information was family/caregiver contacted? Yes     Contact Name/Number Nick Sr Zuhair (father) [phone]     Communicated SOFIA with patient/caregiver Date not available/Unable to determine     Reason For Admission Acute Encephalopathy     People in Home parent(s)     Facility Arrived From: [facility]     Do you expect to return to your current living situation? Yes     Do you have help at home or someone to help you manage your care at home? Yes     Who are your caregiver(s) and their phone number(s)? Nicklili Moffett Sr (father) [phone]     Prior to hospitilization cognitive status: Alert/Oriented     Current cognitive status: Coma/Sedated/Intubated;Unable to Assess     Walking or Climbing Stairs Difficulty no     Dressing/Bathing Difficulty no     Home Accessibility stairs to enter home     Number of Stairs, Main Entrance two     Home Layout Able to live on 1st floor     Equipment Currently Used at Home none     Readmission within 30 days? No     Patient currently being followed by outpatient case management? No     Do you currently have service(s) that help you manage your care at home? No     Do you take prescription medications? Yes     Do you have prescription coverage? Yes     Coverage Healthy Blue     Do you have any problems affording any of your prescribed medications? No     Is the patient taking medications as prescribed? --   catarino    Who is going to help you get home at discharge? Nicklili Moffett Sr (father) [phone]     How do you get to doctors appointments? family or friend will provide     Are you on dialysis? No     Do you take coumadin? No     Discharge Plan A Home     Discharge Plan B Home Health     DME Needed Upon Discharge  other (see comments)   tbd    Discharge Plan discussed with: Parent(s)     Name(s) and Number(s) Nick Moffett Sr (father) [phone] (at bedside)     Transition of Care Barriers Underinsured;Mental illness         Physical Activity    On average, how many days per week do you engage in moderate to strenuous exercise (like a brisk walk)? 7 days     On average, how many minutes do you engage in exercise at this level? 150+ min        Financial Resource Strain    How hard is it for you to pay for the very basics like food, housing, medical care, and heating? Very hard        Housing Stability    In the last 12 months, was there a time when you were not able to pay the mortgage or rent on time? No     In the last 12 months, how many places have you lived? 1     In the last 12 months, was there a time when you did not have a steady place to sleep or slept in a shelter (including now)? No        Transportation Needs    In the past 12 months, has lack of transportation kept you from medical appointments or from getting medications? No     In the past 12 months, has lack of transportation kept you from meetings, work, or from getting things needed for daily living? No        Food Insecurity    Within the past 12 months, you worried that your food would run out before you got the money to buy more. Never true     Within the past 12 months, the food you bought just didn't last and you didn't have money to get more. Never true        Stress    Do you feel stress - tense, restless, nervous, or anxious, or unable to sleep at night because your mind is troubled all the time - these days? Patient unable to answer        Social Connections    In a typical week, how many times do you talk on the phone with family, friends, or neighbors? More than three times a week     How often do you get together with friends or relatives? More than three times a week     How often do you attend Holiness or Scientologist services? Never     Do you belong to any clubs or organizations such as Holiness groups, unions, fraternal or athletic groups, or school groups? No     How often do you attend meetings of the clubs or organizations you belong to? Never     Are you  , , , , never , or living with a partner? Never         Alcohol Use    Q1: How often do you have a drink containing alcohol? Never     Q2: How many drinks containing alcohol do you have on a typical day when you are drinking? Patient does not drink     Q3: How often do you have six or more drinks on one occasion? Never        OTHER    Name(s) of People in Home Preeti Moffett (mother) [phone]                        Discharge Plan A and Plan B have been determined by review of patient's clinical status, future medical and therapeutic needs, and coverage/benefits for post-acute care in coordination with multidisciplinary team members.      Faith Dodson RN, CCRN-K, [facility]  Neuro-Critical Care   [phone]

## 2024-02-07 NOTE — NURSING
Patient arrived to [facility] from [facility] via EMS  (name of hospital or home) by (mode of transportation & company name)    Report received from: OSH RN, Mathieu RN    Type of stroke/diagnosis:  Acute encehalopathy    Current symptoms: Pupils 2's and brisk, + cough, gag, corneals; BUE no movement, BLE triple flexion, no eye opening. Arrived intubated on versed and propofol drip.       Skin Assessment done: Yes  Wounds noted: None    Skin Assessment Verified by:  MCKAYLA Car Completed? yes    Patient Belongings on Admit: socks    [facility] notified: Dr. Pepper

## 2024-02-07 NOTE — RESPIRATORY THERAPY
Patient arrived intubated with size 8 ETT placed 26 @ lips.  Patient ETT is intact, patent, and secured with commercial tube sotelo.  Placed patient on MV set on documented settings.  Will continue to monitor.

## 2024-02-07 NOTE — NURSING
Patient arrived to [facility] from [facility] by [facility] EMS    Report received from: Mathieu SHAFER RN    Type of stroke/diagnosis:  seizures    Tenecteplase start and end time (if applicable) N/A    Thrombectomy start and end time (if applicable) N/A    Current symptoms: intubated    Skin Assessment done: Y  Wounds noted:  *If wounds noted, was Wound Care consulted? Y/N  *If wounds noted, LDA placed? Y/N  Skin Assessment Verified by:      Vandana Completed?  Y- failed    Patient Belongings on Admit: 1 pair of black socks    NCC notified: DOMENIC Wong

## 2024-02-07 NOTE — ASSESSMENT & PLAN NOTE
Abdomen tense on exam, pt intubated/sedated tenderness difficult to assess. No discrete masses felt. NG placed, confirmed, to suction. Imaging pending

## 2024-02-07 NOTE — PROGRESS NOTES
EEG Hook up  AM Check Electrodes had to be fixed.Yes    Skin Integrity: Normal   No signs of skin breakdown seen during hook-up   Mirta Bustamante   02/07/2024 3:31 PM

## 2024-02-07 NOTE — PROCEDURES
Nick Moffett Jr is a 43 y.o. male patient.    Temp: 98.1 °F (36.7 °C) (02/07/24 0905)  Pulse: 104 (02/07/24 0905)  Resp: 17 (02/07/24 0905)  BP: (!) 164/70 (02/07/24 0938)  SpO2: 100 % (02/07/24 0905)  Weight: 103.8 kg (228 lb 13.4 oz) (02/07/24 0949)  Height: 6' (182.9 cm) (02/07/24 0851)       Arterial Line    Date/Time: 2/7/2024 10:17 AM  Location procedure was performed: [facility] NEURO CRITICAL CARE    Performed by: Abdiel Finn MD  Authorized by: Abdiel Finn MD  Assisting provider: Javy Nunez  Consent Done: Emergent Situation  Preparation: Patient was prepped and draped in the usual sterile fashion.  Indications: multiple ABGs and hemodynamic monitoring  Location: left radial  Anesthesia: see MAR for details    Patient sedated: yes  Needle gauge: 20  Seldinger technique: Seldinger technique used  Number of attempts: 1  Complications: No  Estimated blood loss (mL): 3  Specimens: No  Implants: No  Post-procedure: line sutured and dressing applied  Patient tolerance: Patient tolerated the procedure well with no immediate complications          2/7/2024

## 2024-02-07 NOTE — ASSESSMENT & PLAN NOTE
Hx previous hospitalizations for exacerbations characterized by grandiose thinking, audiovisual hallucinations, +/- violent ideation    Per mother receives Invega Sustena injections q28 days d/t adherence issue with daily meds  Per chart review receives Invega Sustena as above + Zyprexa 15mg BID --> will restart inpatient    (Prior medication regimen: Geodon 60 daily, Zyprexa 50 daily, Seroquel 50 daily)

## 2024-02-07 NOTE — ASSESSMENT & PLAN NOTE
44 y/o M found down after an unknown amount of time. Pupils fixed and pinpoint upon arrival to ED and became midpoint after narcan administration. Utox (+) opioids and benzos. OHS reported UE jerking for which he was given 2 mg ativan and 3 keppra prior to transfer.    -Admit to NCC  -Hourly neurochecks, vital checks  -Daily CBC, CMP, mag, phos  -SCDs, lovenox  -PT/OT/SLP as appropriate    Plan:   -Witnessed jerking at [facility] s/p benzo admin and keppra load  -Schedule keppra 1 g bid, and place EEG cap  -CT head unremarkable, consider MRI to rule out anoxic injury  -Febrile leukocytosis at [facility], pancx culture and initiate broad spectrum coverage   -BUN, lipase WNL  -Ammonia, amylase pending  -Correct the patient's multiple metabolic derangements including but not limited to lactic acidosis and hypercarbia  -Wean sedation as able

## 2024-02-07 NOTE — ASSESSMENT & PLAN NOTE
Trend q6h with lactic   Adjust mechanical ventilation settings as appropriate  MAP > 65    Recent Labs     02/07/24  1132   PH 7.364   PCO2 40.7   PO2 126*   HCO3 23.2*   POCSATURATED 99   BE -2

## 2024-02-08 ENCOUNTER — DOCUMENTATION ONLY (OUTPATIENT)
Dept: NEUROLOGY | Facility: CLINIC | Age: 44
End: 2024-02-08

## 2024-02-08 LAB
ALBUMIN SERPL BCP-MCNC: 3.3 G/DL (ref 3.5–5.2)
ALLENS TEST: ABNORMAL
ALP SERPL-CCNC: 55 U/L (ref 55–135)
ALT SERPL W/O P-5'-P-CCNC: 18 U/L (ref 10–44)
ANION GAP SERPL CALC-SCNC: 14 MMOL/L (ref 8–16)
AST SERPL-CCNC: 45 U/L (ref 10–40)
BASOPHILS # BLD AUTO: 0.05 K/UL (ref 0–0.2)
BASOPHILS NFR BLD: 0.5 % (ref 0–1.9)
BILIRUB SERPL-MCNC: 0.7 MG/DL (ref 0.1–1)
BUN SERPL-MCNC: 18 MG/DL (ref 6–20)
CALCIUM SERPL-MCNC: 9.1 MG/DL (ref 8.7–10.5)
CHLORIDE SERPL-SCNC: 106 MMOL/L (ref 95–110)
CO2 SERPL-SCNC: 17 MMOL/L (ref 23–29)
CREAT SERPL-MCNC: 1.1 MG/DL (ref 0.5–1.4)
DELSYS: ABNORMAL
DIFFERENTIAL METHOD BLD: ABNORMAL
EOSINOPHIL # BLD AUTO: 0.1 K/UL (ref 0–0.5)
EOSINOPHIL NFR BLD: 0.9 % (ref 0–8)
ERYTHROCYTE [DISTWIDTH] IN BLOOD BY AUTOMATED COUNT: 13.1 % (ref 11.5–14.5)
ERYTHROCYTE [SEDIMENTATION RATE] IN BLOOD BY WESTERGREN METHOD: 16 MM/H
EST. GFR  (NO RACE VARIABLE): >60 ML/MIN/1.73 M^2
FIO2: 40
GLUCOSE SERPL-MCNC: 109 MG/DL (ref 70–110)
HCO3 UR-SCNC: 26.8 MMOL/L (ref 24–28)
HCT VFR BLD AUTO: 39.3 % (ref 40–54)
HCT VFR BLD CALC: 33 %PCV (ref 36–54)
HGB BLD-MCNC: 12.5 G/DL (ref 14–18)
IMM GRANULOCYTES # BLD AUTO: 0.02 K/UL (ref 0–0.04)
IMM GRANULOCYTES NFR BLD AUTO: 0.2 % (ref 0–0.5)
LACTATE SERPL-SCNC: 0.9 MMOL/L (ref 0.5–2.2)
LACTATE SERPL-SCNC: 1 MMOL/L (ref 0.5–2.2)
LACTATE SERPL-SCNC: 2.1 MMOL/L (ref 0.5–2.2)
LACTATE SERPL-SCNC: 3.1 MMOL/L (ref 0.5–2.2)
LYMPHOCYTES # BLD AUTO: 2.1 K/UL (ref 1–4.8)
LYMPHOCYTES NFR BLD: 21.8 % (ref 18–48)
MAGNESIUM SERPL-MCNC: 1.9 MG/DL (ref 1.6–2.6)
MCH RBC QN AUTO: 28.5 PG (ref 27–31)
MCHC RBC AUTO-ENTMCNC: 31.8 G/DL (ref 32–36)
MCV RBC AUTO: 90 FL (ref 82–98)
MODE: ABNORMAL
MONOCYTES # BLD AUTO: 1.6 K/UL (ref 0.3–1)
MONOCYTES NFR BLD: 16.2 % (ref 4–15)
NEUTROPHILS # BLD AUTO: 5.9 K/UL (ref 1.8–7.7)
NEUTROPHILS NFR BLD: 60.4 % (ref 38–73)
NRBC BLD-RTO: 0 /100 WBC
PCO2 BLDA: 48.6 MMHG (ref 35–45)
PEEP: 5
PH SMN: 7.35 [PH] (ref 7.35–7.45)
PHOSPHATE SERPL-MCNC: 4.6 MG/DL (ref 2.7–4.5)
PLATELET # BLD AUTO: 306 K/UL (ref 150–450)
PMV BLD AUTO: 9.4 FL (ref 9.2–12.9)
PO2 BLDA: 157 MMHG (ref 80–100)
POC BE: 1 MMOL/L
POC SATURATED O2: 99 % (ref 95–100)
POC TCO2: 28 MMOL/L (ref 23–27)
POCT GLUCOSE: 104 MG/DL (ref 70–110)
POCT GLUCOSE: 120 MG/DL (ref 70–110)
POCT GLUCOSE: 92 MG/DL (ref 70–110)
POTASSIUM SERPL-SCNC: 4.1 MMOL/L (ref 3.5–5.1)
PROT SERPL-MCNC: 6.5 G/DL (ref 6–8.4)
RBC # BLD AUTO: 4.39 M/UL (ref 4.6–6.2)
RPR SER QL: NORMAL
SAMPLE: ABNORMAL
SITE: ABNORMAL
SODIUM SERPL-SCNC: 137 MMOL/L (ref 136–145)
VANCOMYCIN SERPL-MCNC: 15.9 UG/ML
VANCOMYCIN SERPL-MCNC: 2.1 UG/ML
VT: 450
WBC # BLD AUTO: 9.81 K/UL (ref 3.9–12.7)

## 2024-02-08 PROCEDURE — 86592 SYPHILIS TEST NON-TREP QUAL: CPT

## 2024-02-08 PROCEDURE — 63600175 PHARM REV CODE 636 W HCPCS: Performed by: STUDENT IN AN ORGANIZED HEALTH CARE EDUCATION/TRAINING PROGRAM

## 2024-02-08 PROCEDURE — 27100171 HC OXYGEN HIGH FLOW UP TO 24 HOURS

## 2024-02-08 PROCEDURE — 99223 1ST HOSP IP/OBS HIGH 75: CPT | Mod: FS,,, | Performed by: PSYCHIATRY & NEUROLOGY

## 2024-02-08 PROCEDURE — 99223 1ST HOSP IP/OBS HIGH 75: CPT | Mod: ,,, | Performed by: EMERGENCY MEDICINE

## 2024-02-08 PROCEDURE — 25000003 PHARM REV CODE 250

## 2024-02-08 PROCEDURE — 25000003 PHARM REV CODE 250: Performed by: STUDENT IN AN ORGANIZED HEALTH CARE EDUCATION/TRAINING PROGRAM

## 2024-02-08 PROCEDURE — 80202 ASSAY OF VANCOMYCIN: CPT | Performed by: PSYCHIATRY & NEUROLOGY

## 2024-02-08 PROCEDURE — 84100 ASSAY OF PHOSPHORUS: CPT

## 2024-02-08 PROCEDURE — 82803 BLOOD GASES ANY COMBINATION: CPT

## 2024-02-08 PROCEDURE — 80053 COMPREHEN METABOLIC PANEL: CPT

## 2024-02-08 PROCEDURE — 63600175 PHARM REV CODE 636 W HCPCS: Performed by: PSYCHIATRY & NEUROLOGY

## 2024-02-08 PROCEDURE — 99900035 HC TECH TIME PER 15 MIN (STAT)

## 2024-02-08 PROCEDURE — 83735 ASSAY OF MAGNESIUM: CPT

## 2024-02-08 PROCEDURE — 94761 N-INVAS EAR/PLS OXIMETRY MLT: CPT

## 2024-02-08 PROCEDURE — 95720 EEG PHY/QHP EA INCR W/VEEG: CPT | Mod: ,,, | Performed by: PSYCHIATRY & NEUROLOGY

## 2024-02-08 PROCEDURE — 95714 VEEG EA 12-26 HR UNMNTR: CPT

## 2024-02-08 PROCEDURE — 99900026 HC AIRWAY MAINTENANCE (STAT)

## 2024-02-08 PROCEDURE — 20000000 HC ICU ROOM

## 2024-02-08 PROCEDURE — 63600175 PHARM REV CODE 636 W HCPCS

## 2024-02-08 PROCEDURE — 83605 ASSAY OF LACTIC ACID: CPT

## 2024-02-08 PROCEDURE — 37799 UNLISTED PX VASCULAR SURGERY: CPT

## 2024-02-08 PROCEDURE — 94003 VENT MGMT INPAT SUBQ DAY: CPT

## 2024-02-08 PROCEDURE — 25000003 PHARM REV CODE 250: Performed by: PSYCHIATRY & NEUROLOGY

## 2024-02-08 PROCEDURE — 85025 COMPLETE CBC W/AUTO DIFF WBC: CPT

## 2024-02-08 PROCEDURE — 99499 UNLISTED E&M SERVICE: CPT | Mod: ,,, | Performed by: PHYSICIAN ASSISTANT

## 2024-02-08 PROCEDURE — 80202 ASSAY OF VANCOMYCIN: CPT | Mod: 91 | Performed by: PSYCHIATRY & NEUROLOGY

## 2024-02-08 RX ORDER — PROPOFOL 10 MG/ML
INJECTION, EMULSION INTRAVENOUS
Status: DISPENSED
Start: 2024-02-08 | End: 2024-02-08

## 2024-02-08 RX ORDER — AMOXICILLIN 250 MG
1 CAPSULE ORAL DAILY
Status: DISCONTINUED | OUTPATIENT
Start: 2024-02-08 | End: 2024-02-13 | Stop reason: HOSPADM

## 2024-02-08 RX ORDER — PROPOFOL 10 MG/ML
0-50 INJECTION, EMULSION INTRAVENOUS CONTINUOUS
Status: DISCONTINUED | OUTPATIENT
Start: 2024-02-08 | End: 2024-02-09

## 2024-02-08 RX ORDER — FAMOTIDINE 20 MG/1
20 TABLET, FILM COATED ORAL 2 TIMES DAILY
Status: DISCONTINUED | OUTPATIENT
Start: 2024-02-08 | End: 2024-02-09

## 2024-02-08 RX ORDER — ENOXAPARIN SODIUM 100 MG/ML
40 INJECTION SUBCUTANEOUS EVERY 24 HOURS
Status: DISCONTINUED | OUTPATIENT
Start: 2024-02-08 | End: 2024-02-13 | Stop reason: HOSPADM

## 2024-02-08 RX ADMIN — DEXMEDETOMIDINE HYDROCHLORIDE 0.9 MCG/KG/HR: 4 INJECTION INTRAVENOUS at 12:02

## 2024-02-08 RX ADMIN — FAMOTIDINE 20 MG: 20 TABLET, FILM COATED ORAL at 08:02

## 2024-02-08 RX ADMIN — ENOXAPARIN SODIUM 40 MG: 40 INJECTION SUBCUTANEOUS at 05:02

## 2024-02-08 RX ADMIN — SODIUM CHLORIDE, POTASSIUM CHLORIDE, SODIUM LACTATE AND CALCIUM CHLORIDE: 600; 310; 30; 20 INJECTION, SOLUTION INTRAVENOUS at 04:02

## 2024-02-08 RX ADMIN — PROPOFOL 30 MCG/KG/MIN: 10 INJECTION, EMULSION INTRAVENOUS at 05:02

## 2024-02-08 RX ADMIN — SODIUM CHLORIDE, POTASSIUM CHLORIDE, SODIUM LACTATE AND CALCIUM CHLORIDE 1000 ML: 600; 310; 30; 20 INJECTION, SOLUTION INTRAVENOUS at 07:02

## 2024-02-08 RX ADMIN — LEVETIRACETAM 500 MG: 100 INJECTION INTRAVENOUS at 08:02

## 2024-02-08 RX ADMIN — DEXMEDETOMIDINE HYDROCHLORIDE 0.9 MCG/KG/HR: 4 INJECTION INTRAVENOUS at 05:02

## 2024-02-08 RX ADMIN — LABETALOL HYDROCHLORIDE 10 MG: 5 INJECTION, SOLUTION INTRAVENOUS at 09:02

## 2024-02-08 RX ADMIN — PIPERACILLIN SODIUM AND TAZOBACTAM SODIUM 4.5 G: 4; .5 INJECTION, POWDER, FOR SOLUTION INTRAVENOUS at 05:02

## 2024-02-08 RX ADMIN — SENNOSIDES AND DOCUSATE SODIUM 1 TABLET: 8.6; 5 TABLET ORAL at 08:02

## 2024-02-08 RX ADMIN — PROPOFOL 40 MCG/KG/MIN: 10 INJECTION, EMULSION INTRAVENOUS at 01:02

## 2024-02-08 RX ADMIN — VANCOMYCIN HYDROCHLORIDE 1500 MG: 1.5 INJECTION, POWDER, LYOPHILIZED, FOR SOLUTION INTRAVENOUS at 02:02

## 2024-02-08 RX ADMIN — DEXMEDETOMIDINE HYDROCHLORIDE 0.8 MCG/KG/HR: 4 INJECTION INTRAVENOUS at 09:02

## 2024-02-08 RX ADMIN — PROPOFOL 35 MCG/KG/MIN: 10 INJECTION, EMULSION INTRAVENOUS at 12:02

## 2024-02-08 RX ADMIN — CEFTRIAXONE 2 G: 2 INJECTION, POWDER, FOR SOLUTION INTRAMUSCULAR; INTRAVENOUS at 01:02

## 2024-02-08 RX ADMIN — PROPOFOL 45 MCG/KG/MIN: 10 INJECTION, EMULSION INTRAVENOUS at 09:02

## 2024-02-08 RX ADMIN — PROPOFOL 35 MCG/KG/MIN: 10 INJECTION, EMULSION INTRAVENOUS at 10:02

## 2024-02-08 NOTE — PROGRESS NOTES
Pharmacokinetic Assessment Follow Up: IV Vancomycin    Vancomycin serum concentration assessment(s):    The random level was drawn correctly and can be used to guide therapy at this time. The measurement is below the desired definitive target range of 15 to 20 mcg/mL.    Vancomycin Regimen Plan:    Give Vancomycin 1500 mg IV x1 dose  Renal function improving, current Scr = 1.1  Re-dose when the random level is less than 20 mcg/mL, next level to be drawn at 14:00 on 2/8/24    Drug levels (last 3 results):  Recent Labs   Lab Result Units 02/08/24  0001   Vancomycin, Random ug/mL 2.1       Pharmacy will continue to follow and monitor vancomycin.    Please contact pharmacy at extension [phone] for questions regarding this assessment.    Thank you for the consult,   Johann Cintron       Patient brief summary:  Nick Moffett Jr is a 43 y.o. male initiated on antimicrobial therapy with IV Vancomycin for treatment of Acute encephalopathy     The patient's current regimen is pulse dosing    Drug Allergies:   Review of patient's allergies indicates:  No Known Allergies    Actual Body Weight:   103.8 kg    Renal Function:   Estimated Creatinine Clearance: 107.9 mL/min (based on SCr of 1.1 mg/dL).,     Dialysis Method (if applicable):  N/A    CBC (last 72 hours):  Recent Labs   Lab Result Units 02/06/24 2110 02/07/24  0542 02/07/24  0900 02/07/24  1014 02/08/24  0001   WBC K/uL 20.09* 9.69  --  10.13 9.81   Hemoglobin g/dL 15.1 12.8*  --  12.8* 12.5*   Hemoglobin A1C %  --   --  6.1*  --   --    Hematocrit % 48.2 38.8*  --  39.5* 39.3*   Platelets K/uL 419 368  --  301 306   Gran % % 72.3 72.9  --  67.1 60.4   Lymph % % 13.2* 11.7*  --  14.9* 21.8   Mono % % 11.0 13.9  --  16.6* 16.2*   Eosinophil % % 1.0 0.2  --  0.3 0.9   Basophil % % 0.4 0.3  --  0.3 0.5   Differential Method  Automated Automated  --  Automated Automated       Metabolic Panel (last 72 hours):  Recent Labs   Lab Result Units 02/06/24 2110 02/06/24 2116  02/06/24 2127 02/07/24  0542 02/07/24  0900 02/07/24  1057 02/07/24  1509 02/07/24  1934 02/08/24  0001   Sodium mmol/L 149*  --   --  142 140  --   --  139 137   Potassium mmol/L 4.5  --   --  4.8 5.1  --   --  4.0 4.1   Chloride mmol/L 104  --   --  110 105  --   --  107 106   CO2 mmol/L 19*  --   --  24 25  --   --  19* 17*   Glucose mg/dL 207*  --   --  107 96  --   --  105 109   Glucose, UA   --   --  Negative  --   --  Negative  --   --   --    BUN mg/dL 17  --   --  20 20  --   --  20 18   Creatinine mg/dL 1.62*  --   --  1.29 1.7*  --   --  1.3 1.1   Creatinine, Urine mg/dL  --  93.9  --   --   --  167.0 168.0  168.0  --   --    Albumin g/dL 5.2  --   --  3.9 3.7  --   --  3.3* 3.3*   Total Bilirubin mg/dL 0.3  --   --  0.4 0.5  --   --  0.6 0.7   Alkaline Phosphatase U/L 70  --   --  60 62  --   --  54* 55   AST U/L 38  --   --  42 37  --   --  40 45*   ALT U/L 32  --   --  23 21  --   --  17 18   Magnesium mg/dL 2.2  --   --  1.8 1.8  --   --   --  1.9   Phosphorus mg/dL  --   --   --  3.8 3.4  --   --   --  4.6*       Vancomycin Administrations:  vancomycin given in the last 96 hours                     vancomycin 1,500 mg in dextrose 5 % (D5W) 250 mL IVPB (Vial-Mate) (mg) 1,500 mg New Bag 02/06/24 6260                    Microbiologic Results:  Microbiology Results (last 7 days)       Procedure Component Value Units Date/Time    Blood culture [[id number]] Collected: 02/07/24 1238    Order Status: Completed Specimen: Blood from Peripheral, Antecubital, Left Updated: 02/07/24 2115     Blood Culture, Routine No Growth to date    Blood culture [[id number]] Collected: 02/07/24 1212    Order Status: Completed Specimen: Blood from Peripheral, Ankle, Left Updated: 02/07/24 2115     Blood Culture, Routine No Growth to date

## 2024-02-08 NOTE — PROGRESS NOTES
Vancomycin order and consult has been discontinued. Pharmacy will sign off. Please re-consult if needed.     Thank you,    Ling Vee, PharmD  [phone]

## 2024-02-08 NOTE — ASSESSMENT & PLAN NOTE
Patient with Hypercapnic Respiratory failure which is Acute.  he is not on home oxygen. Supplemental oxygen was provided and noted- Vent Mode: A/C  Oxygen Concentration (%):  [40] 40  Resp Rate Total:  [16 br/min-36 br/min] 18 br/min  Vt Set:  [450 mL] 450 mL  PEEP/CPAP:  [5 cmH20] 5 cmH20  Mean Airway Pressure:  [7.4 cmH20-9.3 cmH20] 8.9 cmH20     Patient Has recent ABG, which has been reviewed. Will treat underlying causes and adjust management of respiratory failure as follows-     Trend ABG q6h and adjust settings as appropriate  Daily CXR

## 2024-02-08 NOTE — HOSPITAL COURSE
02/08/2024 EEG with diffuse disorganized low-amplitude slowing, no seizure activity. Weaning propofol and precedex. SBT failed due to acute desaturation, likely due to tachypnea, continue ventilator wean as tolerated.   02/09/2024 Self extubation in AM, O2 saturation stable and without evidence of respiratory distress. Able to vocalize and with strong cough. Post extubation, patient agitated, accusing medical team and family members of lying and attempting to admit him to a psychiatric facility. Precedex started. Psych consulted for medication management in setting of presumed NMS.  2/10/2024: No acute events overnight. Continues on precedex infusion. Wean as tolerated. Psychiatry following.   2/11/24:No acute events, patient weaned off of propofol overnight.

## 2024-02-08 NOTE — PLAN OF CARE
Medical Toxicology: Plan of Care Note    Patient Name: Nick Moffett Jr  MRN: [id number]  Admission Date: 2/7/2024  Hospital Length of Stay: 0 days  Attending Physician: Je Chahal MD       RECOMMENDATIONS   Continue supportive care, no specific antidotes are recommended at this time.  Consider liberal use of benzodiazepines, as needed for increased muscle tone or myoclonus if present.    Given history of atypical antipsychotic depot and reported increased tone, consider avoiding antidopaminergic agents if concern for neuroleptic malignant syndrome.   If lower extremity clonus is present and not consistent with signs of intracranial abnormality such as upper motor neuron sign, consider avoiding serotonergic agents to avoid serotonin syndrome.     2.   Possible opioid overdose. False positive opioid screens have not been reported following naloxone administration.   a. Upon review of records, the initial UDS was sent to lab at 2116 on 2/6 prior the patient's first dose of fentanyl recorded at 2120 on 2/6. This +opioid screen likely reflects an exposure, however, the positive fentanyl screen from 2/7 is difficult to interpret given the patient is known to have received fentanyl during his hospitalization.    3. Possible clonidine overdose. Treatment is supportive for bradycardia and hypotension.    Full consultation to follow after evaluation tomorrow  ___________________________________________________________    Patient information was obtained from ER records and primary team.     REASON FOR CONSULT: Opioid overdose, Clonidine overdose    Consults  Subjective:        HPI: Patient is a 43M with a known history of schizophrenia with a reported history of medication non adherence, receiving paliperidone depot, last received on 1/17 and clonidine who was BIBA from home on 2/6 to an outside hospital ED after being found on the porch in front of his home unresponsive with pinpoint pupils. The patient had some  resolution of pupil change with naloxone however no significant change in mental status and thus transported to the ED where he was intubated for airway protection.     The patient's outside hospital course was notable for some seizure like activity for which he received midazolam pre-hospital, leukocytosis on labs for which the patient received broad spectrum antibiotics, a UDS positive for opioids and benzodiazepines, and AGMA with initial lactate of 11, and a CT head was unremarkable. The patient was transferred to the neuro ICU at [facility] [place], [place] for further evaluation.     The patient remains intubated, now with rising temperatures and some concern for clonus and increased tone per the primary team's discussion with Dr. Pope.     Past Medical History:   Diagnosis Date    Hypertension        Past Surgical History:   Procedure Laterality Date    APPENDECTOMY         Review of patient's allergies indicates:  No Known Allergies    Current Facility-Administered Medications on File Prior to Encounter   Medication    [COMPLETED] acetaminophen suppository 650 mg    [COMPLETED] ceFEPIme (MAXIPIME) 2 g in dextrose 5 % in water (D5W) 100 mL IVPB (MB+)    [COMPLETED] etomidate injection 20 mg    [COMPLETED] fentaNYL 50 mcg/mL injection 100 mcg    [COMPLETED] levETIRAcetam (Keppra) 3,000 mg in dextrose 5 % (D5W) 250 mL IVPB    [COMPLETED] LORazepam injection 2 mg    [COMPLETED] propofol (DIPRIVAN) 10 mg/mL infusion    [COMPLETED] rocuronium injection 100 mg    [COMPLETED] sodium chloride 0.9% bolus 1,000 mL 1,000 mL    [COMPLETED] sodium chloride 0.9% bolus 1,000 mL 1,000 mL    [COMPLETED] vancomycin 1,500 mg in dextrose 5 % (D5W) 250 mL IVPB (Vial-Mate)    [DISCONTINUED] enoxaparin injection 40 mg    [DISCONTINUED] fentaNYL 50 mcg/mL injection 25 mcg    [DISCONTINUED] heparin (porcine) injection 5,000 Units    [DISCONTINUED] lactated ringers infusion    [DISCONTINUED] levETIRAcetam injection 1,000 mg    [DISCONTINUED]  levETIRAcetam injection 1,000 mg    [DISCONTINUED] midazolam in dextrose 5 % infusion    [DISCONTINUED] niCARdipine 40 mg/200 mL (0.2 mg/mL) infusion    [DISCONTINUED] piperacillin-tazobactam (ZOSYN) 4.5 g in dextrose 5 % in water (D5W) 100 mL IVPB (MB+)    [DISCONTINUED] propofol (DIPRIVAN) 10 mg/mL infusion    [DISCONTINUED] sodium chloride 0.9% flush 10 mL    [DISCONTINUED] vancomycin - pharmacy to dose     Current Outpatient Medications on File Prior to Encounter   Medication Sig    cloNIDine (CATAPRES) 0.1 MG tablet Take 0.1 mg by mouth once daily.    INVEGA SUSTENNA 156 mg/mL Syrg injection Inject 156 mg into the muscle every 28 days.    [DISCONTINUED] hydroCHLOROthiazide (HYDRODIURIL) 12.5 MG Tab Take 1 tablet (12.5 mg total) by mouth once daily.    [DISCONTINUED] NIFEdipine (PROCARDIA-XL) 60 MG (OSM) 24 hr tablet Take 1 tablet (60 mg total) by mouth once daily.    [DISCONTINUED] OLANZapine (ZYPREXA) 15 MG Tab Take 1 tablet (15 mg total) by mouth 2 (two) times a day.     Family History    None       Tobacco Use    Smoking status: Every Day     Current packs/day: 1.00     Average packs/day: 1 pack/day for 27.4 years (27.4 ttl pk-yrs)     Types: Cigarettes     Start date: 10/1/1996    Smokeless tobacco: Never   Substance and Sexual Activity    Alcohol use: Never    Drug use: Never    Sexual activity: Not on file       Objective:     Vital Signs (Most Recent):  Temp: (!) 100.4 °F (38 °C) (02/07/24 1919)  Pulse: 98 (02/07/24 1919)  Resp: 18 (02/07/24 1919)  BP: (!) 98/54 (02/07/24 1905)  SpO2: 100 % (02/07/24 1919) Vital Signs (24h Range):  Temp:  [91.4 °F (33 °C)-101.8 °F (38.8 °C)] 100.4 °F (38 °C)  Pulse:  [] 98  Resp:  [13-35] 18  SpO2:  [90 %-100 %] 100 %  BP: ()/() 98/54  Arterial Line BP: (121-181)/(59-84) 121/59     Weight: 103.8 kg (228 lb 13.4 oz)  Body mass index is 31.04 kg/m².        Significant Labs: All pertinent labs within the past 24 hours have been reviewed.  CBC:   Recent  Labs   Lab 02/06/24 2110 02/07/24  0542 02/07/24  1014   WBC 20.09* 9.69 10.13   HGB 15.1 12.8* 12.8*   HCT 48.2 38.8* 39.5*    368 301     CMP:   Recent Labs   Lab 02/06/24 2110 02/07/24  0542 02/07/24  0900   * 142 140   K 4.5 4.8 5.1    110 105   CO2 19* 24 25   * 107 96   BUN 17 20 20   CREATININE 1.62* 1.29 1.7*   CALCIUM 9.0 8.1* 8.9   PROT 9.4* 6.9 7.1   ALBUMIN 5.2 3.9 3.7   BILITOT 0.3 0.4 0.5   ALKPHOS 70 60 62   AST 38 42 37   ALT 32 23 21   ANIONGAP 26* 8 10     Lactic Acid:   Recent Labs   Lab 02/07/24 0035 02/07/24  0542 02/07/24  0900   LACTATE 4.2* 1.4 2.6*     APAP not completed  ASA negative  UDS from OSH recorded as collected at 2116 on 2/6/24  Fentanyl + from 2/7/24  Initial lactate 11.6  Initial EKG With sinus tachycardia, QRS 92, Qtc 410  Initial [id number]  Troponin 0.03->0.8 (most recent)    Echocardiogram with EF 55-60%    Significant Imaging: CTH without evidence of ICH, no evidence of cerebral edema        Courtney Christianson MD  Medical Toxicology  [facility]

## 2024-02-08 NOTE — ASSESSMENT & PLAN NOTE
43M found down after an unknown amount of time. Pupils fixed and pinpoint upon arrival to ED and became midpoint after narcan administration. Utox (+) opioids and benzos. OHS reported UE jerking for which he was given 2 mg ativan and 3 keppra prior to transfer.    - Continued admission to [facility]  - Toxicology consulted, appreciate assistance   - Q1h neurochecks while in ICU  - Q1h vitals while in ICU  - HOB@30  - EEG without seizure activity, discontinue Keppra  - SBP <180  - EKG reviewed, Troponin down trending  - Intubated and mechanically ventilated  - Daily SBT with goal to extubate as able  - H2B while intubated   - Daily CMP, Mag, Phos - replete electrolytes PRN  - Daily CBC, transfuse PRN  - Lovenox  - PT/OT/SLP as appropriate  - CM/SW consult for dispo planning

## 2024-02-08 NOTE — CONSULTS
Epilepsy Team    CC: EEG placed for concern for epileptiform activity/seizures    HPI: 43 year old male with a PMHx of HTN and schizophrenia who presented to the ER 2/6 via EMS after found unresponsive by family with agonal respirations, noted to have tremulous movements, transferred to [facility] [place] for higher level of care and admitted to [facility] for further management of encephalopathy of unknown etiology. HPI per chart review as patient currently intubated and sedated. EEG placed 2/7; Epilepsy following for assistance in management.     Unable to obtain patient's family and social history due to patient intubated.    Patient is currently maintained on Levetiracetam 500 mg BID and Propofol gtt.      Review of systems:  Not performed secondary to patient intubated.    Physical Exam  General: Sedated, intubated, NAD.  HEENT: Normocephalic. Atraumatic. EEG in place.  Pulmonary: Effort normal, no respiratory distress.  Cardiac: Normal rate.   Skin: Warm and dry.  Psych: Unable to assess.   Neuro:  Arouses to stimuli  SEBAS OU   Corneal intact OU   + spontaneous eye opening   Face symmetric   Tongue midline   Moves all extremities   Follows simple commands    Exam findings to suggest seizures:  Myoclonus - no   eye twitching - no   Nystagmus - no   gaze deviation - no   waxy rigidity - no      EEG reviewed by Epileptologist, findings include moderate to severe encephalopathy, no epileptiform activity, no electrographic seizures; see EEG report for details.     Encephalopathy  Recommendations: Encephalopathy is nonspecific in regards to etiology. No indication for escalation of anti-seizure drug at this time. Recommend continuing EEG while weaning Propofol. Findings and recommendations discussed with [facility] team.      Acute respiratory failure with hypercapnia  On mechanically assisted ventilation  - Intubated and sedated  - [facility] weaning Propofol today  - Vent management per [facility]    FREDERICK  - Cr 1.6 on admit > 1.1 today  - Management per  [facility]    Schizophrenia  - Chronic  - Noted to have previous psychiatric hospitalizations  - Management per [facility]      Naheed Avendano PA-C  Neurology-Epilepsy  [facility]  Staff: Dr. Nielson

## 2024-02-08 NOTE — PLAN OF CARE
[facility] Care Plan    POC reviewed with Nick Moffett Jr and family at 0300. Pt verbalized understanding. Questions and concerns addressed. No acute events overnight. Pt progressing toward goals. Will continue to monitor. See below and flowsheets for full assessment and VS info.     cEEG in place, no signs of seizures overnight  1L LR bolus given for high lactic 4.3, continous LR initiated @ 100ml/hr, lactic levels trending down  Prop and precedex titrated   Cooling blanket off      Is this a stroke patient? no    Neuro:  Ember Coma Scale  Best Eye Response: 2-->(E2) to pain  Best Motor Response: 5-->(M5) localizes pain  Best Verbal Response: 1-->(V1) none  Ember Coma Scale Score: 8  Assessment Qualifiers: patient intubated, patient chemically sedated or paralyzed  Pupil PERRLA: yes     24hr Temp:  [91.4 °F (33 °C)-101.8 °F (38.8 °C)]     CV:   Rhythm: sinus tachycardia  BP goals:   SBP < 180  MAP > 65    Resp:      Vent Mode: A/C  Set Rate: 16 BPM  Oxygen Concentration (%): 40  Vt Set: 450 mL  PEEP/CPAP: 5 cmH20  Pressure Support: 5 cmH20    Plan: wean to extubate    GI/:     Diet/Nutrition Received: NPO  Last Bowel Movement: 02/06/24  Voiding Characteristics: urethral catheter (bladder)    Intake/Output Summary (Last 24 hours) at 2/8/2024 0358  Last data filed at 2/8/2024 0300  Gross per 24 hour   Intake 2970.14 ml   Output 2065 ml   Net 905.14 ml     Unmeasured Output  Stool Occurrence: 0    Labs/Accuchecks:  Recent Labs   Lab 02/08/24  0001   WBC 9.81   RBC 4.39*   HGB 12.5*   HCT 39.3*         Recent Labs   Lab 02/08/24  0001      K 4.1   CO2 17*      BUN 18   CREATININE 1.1   ALKPHOS 55   ALT 18   AST 45*   BILITOT 0.7      Recent Labs   Lab 02/06/24  2110   INR 0.9   APTT 27.1      Recent Labs   Lab 02/07/24  0542 02/07/24  0900 02/07/24  1934   CPK 1072*  --   --    TROPONINI 0.590*   < > 0.815*    < > = values in this interval not displayed.       Electrolytes: N/A - electrolytes  WDL  Accuchecks: none    Gtts:   dexmedeTOMIDine (Precedex) infusion (titrating) 0.2 mcg/kg/hr (02/08/24 0300)    lactated ringers 100 mL/hr at 02/08/24 0300    propofoL 35 mcg/kg/min (02/08/24 0300)       LDA/Wounds:    Nurses Note -- 4 Eyes      2/8/2024   3:58 AM      Skin assessed during: Daily Assessment    Is there altered skin present? no   [x] No Altered Skin Integrity Present    [x]Prevention Measures Documented    Attending Nurse:  Shorty BLOCK     Second RN/Staff Member:  Yoel BLOCK     [facility]

## 2024-02-08 NOTE — PROGRESS NOTES
"[facility] - Neuro Critical Care  Neurocritical Care  Progress Note    Admit Date: 2/7/2024  Service Date: 02/08/2024  Length of Stay: 1    Subjective:     Chief Complaint: Acute encephalopathy    History of Present Illness: Mr. Nick Moffett  Is 43-year-old male with a PMHx of HTN and schizophrenia who presents to [facility] after being found down on the porch by family members with unknown down time. Upon EMS arrival, agonal respirations with frothy sputum and pinpoint pupils were noted. Narcan was administered and pupils reportedly midpoint but remained non-reactive. .  Agonal respirations frothy sputum noted at the mouth. He also appeared to be "clenching up," concerning for fever activity. He was brought to [facility] where he was intubated for airway protection after he was noted to be severely hypercarbic in the low 70s and loaded with 2 mg ativan and 3 g keppra. Utox (+) for benzos and opioids.  Tmax at [facility] at 100.8 with WBC 20. He was administered a one time dose of both vancomycin and cefepime but does not appear to have been cultured prior to transfer.   [place] [facility] [place] [facility] [place] and a higher level of care.      Hospital Course: 02/08/2024 EEG with diffuse disorganized low-amplitude slowing, no seizure activity. Weaning propofol and precedex. SBT failed due to acute desaturation, likely due to tachypnea, continue ventilator wean as tolerated.     Interval History:  As above.    Review of Systems   Psychiatric/Behavioral:  Positive for agitation.      Unable to obtain a complete ROS due to level of consciousness.    Objective:     Vitals:  Temp: 97.6 °F (36.4 °C)  Pulse: 83  Rhythm: normal sinus rhythm, sinus tachycardia  BP: 136/78  MAP (mmHg): 102  Resp: 17  SpO2: 100 %  Oxygen Concentration (%): 40  Vent Mode: A/C  Set Rate: 16 BPM  Vt Set: 450 mL  PEEP/CPAP: 5 cmH20  Peak Airway Pressure: 20 cmH20  Mean Airway Pressure: 8.9 cmH20  Plateau Pressure: 0 cmH20    Temp  Min: 97.6 " °F (36.4 °C)  Max: 101.8 °F (38.8 °C)  Pulse  Min: 67  Max: 115  BP  Min: 98/54  Max: 170/81  MAP (mmHg)  Min: 72  Max: 114  Resp  Min: 16  Max: 33  SpO2  Min: 91 %  Max: 100 %  Oxygen Concentration (%)  Min: 40  Max: 40    02/07 0701 - 02/08 0700  In: 3581.2 [I.V.:1251.9]  Out: 2170 [Urine:1770; Drains:400]   Unmeasured Output  Stool Occurrence: 0        Physical Exam  Vitals and nursing note reviewed.   Constitutional:       Comments:   Exam done with Propofol paused   HENT:      Head: Normocephalic and atraumatic.   Eyes:      Extraocular Movements: Extraocular movements intact.      Pupils: Pupils are equal, round, and reactive to light.      Comments: Tracks examiner in all directions   Cardiovascular:      Rate and Rhythm: Regular rhythm. Tachycardia present.   Pulmonary:      Effort: Pulmonary effort is normal.      Comments: Intubated and mechanically ventilated   Abdominal:      Comments: Distended    Musculoskeletal:         General: Normal range of motion.   Skin:     General: Skin is dry.   Neurological:      Comments:   Exam done off Propofol, Precedex running  [id number]  Eyes open spontaneously, tracks examiner  PERRL  Intubated, mechanical ventilation   Face grossly symmetric noting intubation   Moves all extremities to command and with spontaneous purposeful movement         Unable to test orientation, language, memory, judgment, insight, fund of knowledge, due to clinical status.        Medications:  ContinuousdexmedeTOMIDine (Precedex) infusion (titrating), Last Rate: 0.9 mcg/kg/hr (02/08/24 1403)  lactated ringers, Last Rate: Stopped (02/08/24 0723)  propofoL, Last Rate: 40 mcg/kg/min (02/08/24 1403)    ScheduledcefTRIAXone (Rocephin) IV (PEDS and ADULTS), 2 g, Q24H  enoxparin, 40 mg, Q24H (prophylaxis, 1700)  famotidine, 20 mg, BID  levETIRAcetam (Keppra) IV (PEDS and ADULTS), 500 mg, Q12H  senna-docusate 8.6-50 mg, 1 tablet, Daily    PRNacetaminophen, 650 mg, Q6H PRN  dextrose 10%, 12.5 g,  PRN  dextrose 10%, 25 g, PRN  glucagon (human recombinant), 1 mg, PRN  glucose, 16 g, PRN  glucose, 24 g, PRN  hydrALAZINE, 10 mg, Q6H PRN  insulin aspart U-100, 0-5 Units, QID (AC + HS) PRN  labetalol, 10 mg, Q4H PRN  magnesium oxide, 800 mg, PRN  magnesium oxide, 800 mg, PRN  ondansetron, 8 mg, Q8H PRN  potassium bicarbonate, 35 mEq, PRN  potassium bicarbonate, 50 mEq, PRN  potassium bicarbonate, 60 mEq, PRN  potassium, sodium phosphates, 2 packet, PRN  potassium, sodium phosphates, 2 packet, PRN  potassium, sodium phosphates, 2 packet, PRN      Today I personally reviewed pertinent medications, lines/drains/airways, imaging, cardiology results, laboratory results, microbiology results, notably:    Estimated Creatinine Clearance: 107.9 mL/min (based on SCr of 1.1 mg/dL).    Diet  No diet orders on file  No diet orders on file      Assessment/Plan:     Neuro  * Acute encephalopathy  43M found down after an unknown amount of time. Pupils fixed and pinpoint upon arrival to ED and became midpoint after narcan administration. Utox (+) opioids and benzos. [facility] reported UE jerking for which he was given 2 mg ativan and 3 keppra prior to transfer.    - Continued admission to [facility]  - Toxicology consulted, appreciate assistance   - Q1h neurochecks while in ICU  - Q1h vitals while in ICU  - HOB@30  - EEG without seizure activity, discontinue Keppra  - SBP <180  - EKG reviewed, Troponin down trending  - Intubated and mechanically ventilated  - Daily SBT with goal to extubate as able  - H2B while intubated   - Daily CMP, Mag, Phos - replete electrolytes PRN  - Daily CBC, transfuse PRN  - Lovenox  - PT/OT/SLP as appropriate  - CM/SW consult for dispo planning    Psychiatric  Schizophrenia  Hx previous hospitalizations for exacerbations characterized by grandiose thinking, audiovisual hallucinations, +/- violent ideation    Per mother receives Invega Sustena injections q28 days d/t adherence issue with daily meds  Per chart review  receives Invega Sustena     (Prior medication regimen: Geodon 60 daily, Zyprexa 50 daily, Seroquel 50 daily)    Pulmonary  On mechanically assisted ventilation  See Acute respiratory failure with hypercapnia     Acute respiratory failure with hypercapnia  Patient with Hypercapnic Respiratory failure which is Acute.  he is not on home oxygen. Supplemental oxygen was provided and noted- Vent Mode: A/C  Oxygen Concentration (%):  [40] 40  Resp Rate Total:  [16 br/min-36 br/min] 18 br/min  Vt Set:  [450 mL] 450 mL  PEEP/CPAP:  [5 cmH20] 5 cmH20  Mean Airway Pressure:  [7.4 cmH20-9.3 cmH20] 8.9 cmH20     Patient Has recent ABG, which has been reviewed. Will treat underlying causes and adjust management of respiratory failure as follows-     Trend ABG q6h and adjust settings as appropriate  Daily CXR    Cardiac/Vascular  Elevated troponin  Mildly elevated troponin at [facility]  EKG just with nonspecific ST and T wave abnormality, suspect demand ischemia    LVH (left ventricular hypertrophy) due to hypertensive disease, without heart failure  Noted on EKG  Echo with LV concentric remodeling, mildly depressed EF (55-60%)    Hypertension  SBP in 200s at [facility], SBP < 180  PRN labetalol, hydralazine    Renal/  Respiratory acidosis with metabolic acidosis  See Acute respiratory failure with hypercapnia     FREDERICK (acute kidney injury)  BUN 17, Cr 1.62 on admission  Hourly I/O  IVF  Daily CMP  Avoid nephrotoxic medications and renally dose as appropriate  Estimated Creatinine Clearance: 107.9 mL/min (based on SCr of 1.1 mg/dL).    GI  Abdominal rigidity  NG to low intermittant suction  CT ABD/Pelvis without acute change     Hepatitis C antibody test positive  Noted Hep C Ab (+) 10/06/2023    Other  Elevated lactic acid level  Lactic acid level 11 at [facility], repeat 2.6, trending  Blood cx, sputum cx pending, broad spec abx  UA not concerning for UTI      Tobacco abuse  Per chart review  Cessation counseling as appropriate          The  patient is being Prophylaxed for:  Venous Thromboembolism with: Chemical  Stress Ulcer with: H2B  Ventilator Pneumonia with: chlorhexidine oral care    Activity Orders            Turn patient every 2 hours starting at 02/08 0400    Progressive Mobility Protocol (mobilize patient to their highest level of functioning at least twice daily) starting at 02/07 2000          Full Code    Shorty Petersen MD  Neurocritical Care  [facility] - Neuro Critical Care

## 2024-02-08 NOTE — PLAN OF CARE
[facility] Care Plan    POC reviewed with Nick Moffett Jr and family at 1400. Pt verbalized understanding. Questions and concerns addressed. No acute events today. Pt progressing toward goals. Will continue to monitor. See below and flowsheets for full assessment and VS info.     -CT abdomen completed  -Propofol currently infusing at 35   -Precedex infusing at 0.2   -1 L LR bolus administered  -Blood cultures submitted  -Amezquita output for shift was 850 ml cloudy yellow urine  -EEG in place   -OGT connected to low intermittent suction  -Tylenol administered twice and cooling blanket applied to maintain normothermia   -PRN labetalol and hydralazine administered to maintain SBP <180        Is this a stroke patient? no    Neuro:  Ember Coma Scale  Best Eye Response: 2-->(E2) to pain  Best Motor Response: 5-->(M5) localizes pain  Best Verbal Response: 1-->(V1) none  Ember Coma Scale Score: 8  Assessment Qualifiers: patient intubated, patient chemically sedated or paralyzed  Pupil PERRLA: yes     24 hr Temp:  [91.4 °F (33 °C)-101.8 °F (38.8 °C)]     CV:   Rhythm: sinus tachycardia  BP goals:   SBP < 180  MAP > 65    Resp:      Vent Mode: A/C  Set Rate: 16 BPM  Oxygen Concentration (%): 40  Vt Set: 450 mL  PEEP/CPAP: 5 cmH20  Pressure Support: 5 cmH20    Plan: wean to extubate    GI/:     Diet/Nutrition Received: NPO  Last Bowel Movement: 02/06/24  Voiding Characteristics: urethral catheter (bladder)    Intake/Output Summary (Last 24 hours) at 2/7/2024 1913  Last data filed at 2/7/2024 1805  Gross per 24 hour   Intake 1007.09 ml   Output 1085 ml   Net -77.91 ml     Unmeasured Output  Stool Occurrence: 0    Labs/Accuchecks:  Recent Labs   Lab 02/07/24  1014   WBC 10.13   RBC 4.48*   HGB 12.8*   HCT 39.5*         Recent Labs   Lab 02/07/24  0900      K 5.1   CO2 25      BUN 20   CREATININE 1.7*   ALKPHOS 62   ALT 21   AST 37   BILITOT 0.5      Recent Labs   Lab 02/06/24  2110   INR 0.9   APTT 27.1       Recent Labs   Lab 02/07/24  0542 02/07/24  0900 02/07/24  1712   CPK 1072*  --   --    TROPONINI 0.590*   < > 0.821*    < > = values in this interval not displayed.       Electrolytes: N/A - electrolytes WDL  Accuchecks: Q6H    Gtts:   dexmedeTOMIDine (Precedex) infusion (titrating) 0.2 mcg/kg/hr (02/07/24 1845)    propofoL 50 mcg/kg/min (02/07/24 1738)       LDA/Wounds:    Nurses Note -- 4 Eyes      2/7/2024   7:13 PM      Skin assessed during: Admit    Is there altered skin present? no   [x] No Altered Skin Integrity Present    [x]Prevention Measures Documented    Attending Nurse:      Second RN/Staff Member:        LEXUS

## 2024-02-08 NOTE — ASSESSMENT & PLAN NOTE
BUN 17, Cr 1.62 on admission  Hourly I/O  IVF  Daily CMP  Avoid nephrotoxic medications and renally dose as appropriate  Estimated Creatinine Clearance: 107.9 mL/min (based on SCr of 1.1 mg/dL).

## 2024-02-08 NOTE — ASSESSMENT & PLAN NOTE
Mildly elevated troponin at [facility]  EKG just with nonspecific ST and T wave abnormality, suspect demand ischemia

## 2024-02-08 NOTE — PROGRESS NOTES
EEG   AM Check Electrodes had to be fixed.No    Skin Integrity: Normal     Sarahi Murdock   02/08/2024 9:41 AM

## 2024-02-08 NOTE — PROCEDURES
DATE: 2/7/2024    EEG NUMBER: FH     REFERRING PHYSICIAN:  Dr. Chahal      This EEG was performed to assess for subclinical seizures      ELECTROENCEPHALOGRAM REPORT     METHODOLOGY:  Electroencephalographic (EEG) recording is with electrodes placed according to the International 10-20 placement system.  Thirty two (32) channels of digital signal are simultaneously recorded from the scalp and may include EKG, EMG, and/or eye monitors.   Recording band pass was 0.1 to 512 hz.  Digital video recording of the patient is simultaneously recorded with the EEG.  The nursing staff report clinical symptoms and may press an event button when the patient has symptoms of clinical interest to the treating physicians.  EEG and video recording is stored and archived in digital format.  The entire recording is visually reviewed, and the times identified by computer analysis as being spikes or seizures are reviewed again.  Activation procedures which include photic stimulation, hyperventilation and instructing patients to perform simple task are done in selected patients.   Compresses spectral analysis (CSA) is also performed on the activity recorded from each individual channel.  This is displayed as a power display of frequencies from 0 to 30 Hz over time.   The CSA analysis is done and displayed continuously.  This is reviewed for asymmetries in power between homologous areas of the scalp and for presence of changes in power which can be seen when seizures occur.  Sections of suspected abnormalities on the CSA is then compared with the original EEG recording.                Personal software was also utilized in the review of this study.  This software suite analyzes the EEG recording in multiple domains.  Coherence and rhythmicity is computed to identify EEG sections which may contain organized seizures.  Each channel undergoes analysis to detect presence of spike and sharp waves which have special and morphological  characteristic of epileptic activity.  The routine EEG recording is converted from spacial into frequency domain.  This is then displayed comparing homologous areas to identify areas of significant asymmetry.  Algorithm to identify non-cortically generated artifact is used to separate eye movement, EMG and other artifact from the EEG.     Recording times   Start on February 7, 2024 at hour 15 minute 7 seconds 29   End on February 8, 2024 at hours 7 minute 0 seconds 14   The total time of EEG recording this study was 15 hours and 44 minutes    EEG FINDINGS:  The recording was obtained with a number of standard bipolar and referential montages during obtunded state.  Diffuse disorganized low amplitude mixed theta  and occasional delta range slowing was noted which was symmetric.  The background was intermixed with symmetric spindles.  Intermittent photic stimulation failed to alter the background.  Variability and reactivity were noted.  There were no interictal epileptiform abnormalities and no clinical or electrographic seizures were recorded.    The EKG channel revealed a sinus rhythm.     IMPRESSION:  This is an abnormal EEG during obtunded state.  Diffuse disorganized low-amplitude slowing of the background was noted       CLINICAL CORRELATION:  The patient is a 43-year-old male who is being evaluated for altered sensorium.  He is currently maintained on intravenous infusions of propofol.  Patient also maintained on Keppra.  This is an abnormal EEG during obtunded state.  The overall degree of disorganization and slowing for given age is suggestive of a moderate to severe encephalopathy, likely a toxic metabolic encephalopathy.  There is no evidence of an epileptic process on this recording.  No seizures were recorded during this study.

## 2024-02-08 NOTE — CONSULTS
Medical Toxicology  Consult Note    Patient Name: Nick Moffett Jr  MRN: [id number]  Admission Date: 2/7/2024  Hospital Length of Stay: 1 days  Attending Physician: Je Chahal MD       RECOMMENDATIONS   Continue supportive care. There is no clear toxidrome at this time or poisoning that requires specific antidotal therapy and thus continued supportive care is the mainstay of treatment.   Agree with dexmedetomidine  for sedation - excellent choice in a patient with a history of clonidine use and history of hypertension.   Monitor for signs of clonidine/dexmedetomidine withdrawal upon extubation.   Monitor for signs of opioid withdrawal upon extubation.   Continue to evaluate for other causes of encephalopathy, fever including infectious as clinically warranted.    2.  Physical exam and history are not consistent with NMS nor serotonin  syndrome - cautious re-introduction or use of anti-dopaminergic agents and serotonergic agents is reasonable if needed.     Thank you for involving the Medical Toxicology Service in the care of this patient please feel free to contact us any time with any questions.     _______________________________________________________________________________    Patient information was obtained from parent, past medical records, ER records, and primary team.     REASON FOR CONSULT: Clonidine overdose, Opioid Overdose    Inpatient Consult to Medical Toxicology  Consult performed by: Courtney Christianson MD  Consult ordered by: Abdiel Finn MD        Subjective:       HPI:   Patient is a 43M with a known history of schizophrenia with a reported history of medication non adherence, receiving paliperidone depot, last received on 1/17 and clonidine who was BIBA from home on 2/6 to an outside hospital ED after being found on the porch in front of his home unresponsive with pinpoint pupils. The patient had some resolution of pupil change with naloxone however no significant change in mental status  and thus transported to the ED where he was intubated for airway protection.      The patient's outside hospital course was notable for some seizure like activity for which he received midazolam pre-hospital, leukocytosis on labs for which the patient received broad spectrum antibiotics, a UDS positive for opioids and benzodiazepines, and AGMA with initial lactate of 11, and a CT head was unremarkable. The patient was transferred to the neuro ICU at [facility] [place].      On further discussion today with the patient's mother who is at the patient's bedside, the patient was incarcerated for 15 years and had a history of drug use prior to his incarceration and was released in August of 2023.  Since that time the patient has lived with her and she has not been aware of any illicit substance use.  She explains that the patient's schizophrenia has been well controlled with Invega and that the primary manifestation of his schizophrenia is delusions of being wealthy and owning homes and items that do not belong to him but he has not typically aggressive or have other significant delusions or hallucinations that she is aware of.  She states that when he was last hospitalized he had been doing well but they increased his dose of Invega on his last visit at the end of January and started him on clonidine at bedtime to help with sleep.    She states that the day of his initial hospitalization the patient was found unresponsive on the porch and they called 911 immediately, police arrived 1st administered naloxone with no change in the patient's mental status that she noticed.    On discussion with the patient's primary team, they have not noticed any increased tone or clonus since his arrival to [facility] but have noticed some agitation with stimulation but switched to add dexmedetomidine and decreased propofol dosing which the patient has been tolerating very well and has been much more effective at controlling his  agitation.  He has required a cooling blanket since yesterday but has been afebrile all day today acute thus far.   Past Medical History:   Diagnosis Date    Hypertension        Past Surgical History:   Procedure Laterality Date    APPENDECTOMY      difficult airway         Review of patient's allergies indicates:  No Known Allergies    No current facility-administered medications on file prior to encounter.     Current Outpatient Medications on File Prior to Encounter   Medication Sig    cloNIDine (CATAPRES) 0.1 MG tablet Take 0.1 mg by mouth once daily.    INVEGA SUSTENNA 156 mg/mL Syrg injection Inject 156 mg into the muscle every 28 days.     Family History    None       Tobacco Use    Smoking status: Every Day     Current packs/day: 1.00     Average packs/day: 1 pack/day for 27.4 years (27.4 ttl pk-yrs)     Types: Cigarettes     Start date: 10/1/1996    Smokeless tobacco: Never   Substance and Sexual Activity    Alcohol use: Never    Drug use: Never    Sexual activity: Not on file     Review of Systems  Unable to obtain, patient intubated, sedated  Objective:     Vital Signs (Most Recent):  Temp: 97.7 °F (36.5 °C) (02/08/24 0705)  Pulse: 75 (02/08/24 0905)  Resp: 16 (02/08/24 0905)  BP: 115/62 (02/08/24 0905)  SpO2: 99 % (02/08/24 0905) Vital Signs (24h Range):  Temp:  [97.7 °F (36.5 °C)-101.8 °F (38.8 °C)] 97.7 °F (36.5 °C)  Pulse:  [] 75  Resp:  [16-29] 16  SpO2:  [91 %-100 %] 99 %  BP: ()/(54-89) 115/62  Arterial Line BP: (110-181)/(59-98) 121/69     Weight: 103.8 kg (228 lb 13.4 oz)  Body mass index is 31.04 kg/m².    Physical Exam     Nursing note and vitals reviewed.  Constitutional: Patient appears well-developed and well-nourished. Intubated, sedated  HENT:   Head: Normocephalic and atraumatic. EEG cap in place  Eyes: Conjunctivae and EOM are normal. Pupils are equal, round, and reactive to light. 2mm, sluggish  Cardiovascular: Normal rate, regular rhythm, normal heart sounds and intact  distal pulses.   Pulmonary/Chest: Transmitted upper airway sounds, ventilated, no rhonchi/wheezes  Abdominal: Abdomen is soft. Patient exhibits mild distension.   Musculoskeletal:      Cervical back: Normal range of motion and neck supple.   Neurological: Sedated, not following commands, normal tone, no clonus, unable to assess for tremulousness  Skin: Skin is warm and dry.  Right hand along dorsal thenar space small macular rash, no confluence, no petechiae, no purulence, no urticaria.  Similar-appearing rash along right foot        Significant Labs: All pertinent labs within the past 24 hours have been reviewed.  CMP:   Recent Labs   Lab 02/07/24  0900 02/07/24  1934 02/08/24  0001    139 137   K 5.1 4.0 4.1    107 106   CO2 25 19* 17*   GLU 96 105 109   BUN 20 20 18   CREATININE 1.7* 1.3 1.1   CALCIUM 8.9 8.9 9.1   PROT 7.1 6.4 6.5   ALBUMIN 3.7 3.3* 3.3*   BILITOT 0.5 0.6 0.7   ALKPHOS 62 54* 55   AST 37 40 45*   ALT 21 17 18   ANIONGAP 10 13 14       Lactic Acid:   Recent Labs   Lab 02/07/24  1934 02/08/24  0241 02/08/24  0751   LACTATE 4.3* 3.1* 2.1     Troponin:   Recent Labs   Lab 02/07/24  1240 02/07/24  1712 02/07/24  1934   TROPONINI 0.822* 0.821* 0.815*       Significant Imaging:   Echocardiogram from 02/07/2024 with normal ventricular mass, mild increase in wall thickness with concentric remodeling and ejection fraction of 55-60% with normal-appearing diastolic function    Assessment/Plan:     Patient is a 43-year-old male with a remote history of substance use, schizophrenia who was brought in by ambulance to an outside hospital on 02/06/2024 after being found unresponsive with miosis on his porch without response to naloxone.  EMS noted a change in pupillary size with naloxone however no significant change in mental status and thus patient was transported to the ED and intubated for airway protection.  Over the course of the following 2 days, the patient has been persistently febrile with  an initially improving lactate however noted to have increased lactate yesterday of unclear etiology.  The patient is receiving broad-spectrum antibiotics and has required significant sedation for agitation upon weaning sedation however much improved today with dexmedetomidine.  The patient's initial urine drug screen was positive for opioid on screen prior to receiving fentanyl in the emergency department as well as benzodiazepines however patient received midazolam prior to completion of urine drug screen.    The patient's presentation is suggestive of an opiate overdose given urine drug screen notable for opioids, miosis however, the possibility of clonidine overdose is raised given the lack of response to naloxone.  Clonidine overdose given result in miosis, sedation can be easily mistaken for opioid overdose.  Reports of large doses (10 mg greater) of naloxone reversing clonidine associated sedation have been described, however, responses variable.     Given it is unclear when the patient was last seen normal, there is a possibility that the patient had an opiate overdose resulting in sedation or bradypnea given initial hypercapnia on upon arrival and the patient's current encephalopathy may reflect anoxic brain injury if the patient had been hypoxic for some time prior to receiving supportive care.  Is not unusual in the circumstance that patient's in the setting of an opiate overdose will not have a significant response to naloxone.      Active Diagnoses:    Diagnosis Date Noted POA    PRINCIPAL PROBLEM:  Acute encephalopathy [G93.40] 02/06/2024 Yes    Abdominal rigidity [R19.30] 02/07/2024 Yes    Elevated lactic acid level [R79.89] 02/06/2024 Yes    FREDERICK (acute kidney injury) [N17.9] 02/06/2024 Yes    Respiratory acidosis with metabolic acidosis [E87.4] 02/06/2024 Yes    Elevated troponin [R79.89] 02/06/2024 Yes    Acute respiratory failure with hypercapnia [J96.02] 02/06/2024 Yes    On mechanically assisted  ventilation [Z99.11] 02/06/2024 Not Applicable    LVH (left ventricular hypertrophy) due to hypertensive disease, without heart failure [I11.9] 10/07/2023 Yes    Tobacco abuse [Z72.0] 10/06/2023 Yes    Schizophrenia [F20.9] 10/06/2023 Yes    Hepatitis C antibody test positive [R76.8] 10/06/2023 Yes    Hypertension [I10]  Yes      Problems Resolved During this Admission:       Brief Pharmacology Review:     Clonidine:    Clonidine is an alpha 2 receptor agonist and acts at the pre synaptic central alpha 2 adrenergic receptors and thus causes and an inpatient of sympathetic outflow.  This decreases heart rate, cardiac output and systemic vascular resistance and thus is an effective antihypertensive.  When used orally, it has a very rapid onset of action, typically within 30 minutes and a decrease in blood pressure is often noted within 2-4 hours.  Because of decreased sympathetic outflow, patients may also be bradycardic, when used therapeutically or in overdose.  In overdose, there is some potential for partial peripheral alpha agonist activity and thus early transient hypertension has been described.  Clonidine also binds imidazoline receptors located primarily in the brainstem which also contributes to decreased catecholamine release.  Activation of imidazoline receptors may also stimulate SANDRA release resulting in sedation in overdose.  There is also some evidence that there may be cross reactivity with opioid receptors though this relationship not clear.  And significant overdose, patient's clonidine overdose are often hypotensive, bradycardic, with CNS depression or coma.  Given clonidine's central alpha 2 effects, miosis is often described and thus it is easily mistaken for an opioid overdose. There are reports of clonidine overdose reversal with naloxone, particularly sedation and hypotension.  In one  case series of 51 children under the age of 16, 78%(40/51) patient has had reversal of sedation with large  doses of naloxone, and some patient has had improvement or resolution of hypotension and bradycardia.  Of the 51 patients, 21 received doses of 10 mg naloxone without any adverse effects noted. (Clin Toxicol ([place]). 2018 Oct;56(10):873-879).   The mainstay of treatment for clonidine overdose is supportive, including fluids, vasopressors as needed for bradycardia and hypotension, airway protection for sedation and coma, if necessary.            Courtney Christianson MD  Medical Toxicology  [facility]

## 2024-02-08 NOTE — ASSESSMENT & PLAN NOTE
Lactic acid level 11 at [facility], repeat 2.6, trending  Blood cx, sputum cx pending, broad spec abx  UA not concerning for UTI

## 2024-02-08 NOTE — SUBJECTIVE & OBJECTIVE
Interval History:  As above.    Review of Systems   Psychiatric/Behavioral:  Positive for agitation.      Unable to obtain a complete ROS due to level of consciousness.    Objective:     Vitals:  Temp: 97.6 °F (36.4 °C)  Pulse: 83  Rhythm: normal sinus rhythm, sinus tachycardia  BP: 136/78  MAP (mmHg): 102  Resp: 17  SpO2: 100 %  Oxygen Concentration (%): 40  Vent Mode: A/C  Set Rate: 16 BPM  Vt Set: 450 mL  PEEP/CPAP: 5 cmH20  Peak Airway Pressure: 20 cmH20  Mean Airway Pressure: 8.9 cmH20  Plateau Pressure: 0 cmH20    Temp  Min: 97.6 °F (36.4 °C)  Max: 101.8 °F (38.8 °C)  Pulse  Min: 67  Max: 115  BP  Min: 98/54  Max: 170/81  MAP (mmHg)  Min: 72  Max: 114  Resp  Min: 16  Max: 33  SpO2  Min: 91 %  Max: 100 %  Oxygen Concentration (%)  Min: 40  Max: 40    02/07 0701 - 02/08 0700  In: 3581.2 [I.V.:1251.9]  Out: 2170 [Urine:1770; Drains:400]   Unmeasured Output  Stool Occurrence: 0        Physical Exam  Vitals and nursing note reviewed.   Constitutional:       Comments:   Exam done with Propofol paused   HENT:      Head: Normocephalic and atraumatic.   Eyes:      Extraocular Movements: Extraocular movements intact.      Pupils: Pupils are equal, round, and reactive to light.      Comments: Tracks examiner in all directions   Cardiovascular:      Rate and Rhythm: Regular rhythm. Tachycardia present.   Pulmonary:      Effort: Pulmonary effort is normal.      Comments: Intubated and mechanically ventilated   Abdominal:      Comments: Distended    Musculoskeletal:         General: Normal range of motion.   Skin:     General: Skin is dry.   Neurological:      Comments:   Exam done off Propofol, Precedex running  [id number]  Eyes open spontaneously, tracks examiner  PERRL  Intubated, mechanical ventilation   Face grossly symmetric noting intubation   Moves all extremities to command and with spontaneous purposeful movement         Unable to test orientation, language, memory, judgment, insight, fund of knowledge, due to  clinical status.        Medications:  ContinuousdexmedeTOMIDine (Precedex) infusion (titrating), Last Rate: 0.9 mcg/kg/hr (02/08/24 1403)  lactated ringers, Last Rate: Stopped (02/08/24 0723)  propofoL, Last Rate: 40 mcg/kg/min (02/08/24 1403)    ScheduledcefTRIAXone (Rocephin) IV (PEDS and ADULTS), 2 g, Q24H  enoxparin, 40 mg, Q24H (prophylaxis, 1700)  famotidine, 20 mg, BID  levETIRAcetam (Keppra) IV (PEDS and ADULTS), 500 mg, Q12H  senna-docusate 8.6-50 mg, 1 tablet, Daily    PRNacetaminophen, 650 mg, Q6H PRN  dextrose 10%, 12.5 g, PRN  dextrose 10%, 25 g, PRN  glucagon (human recombinant), 1 mg, PRN  glucose, 16 g, PRN  glucose, 24 g, PRN  hydrALAZINE, 10 mg, Q6H PRN  insulin aspart U-100, 0-5 Units, QID (AC + HS) PRN  labetalol, 10 mg, Q4H PRN  magnesium oxide, 800 mg, PRN  magnesium oxide, 800 mg, PRN  ondansetron, 8 mg, Q8H PRN  potassium bicarbonate, 35 mEq, PRN  potassium bicarbonate, 50 mEq, PRN  potassium bicarbonate, 60 mEq, PRN  potassium, sodium phosphates, 2 packet, PRN  potassium, sodium phosphates, 2 packet, PRN  potassium, sodium phosphates, 2 packet, PRN      Today I personally reviewed pertinent medications, lines/drains/airways, imaging, cardiology results, laboratory results, microbiology results, notably:    Estimated Creatinine Clearance: 107.9 mL/min (based on SCr of 1.1 mg/dL).    Diet  No diet orders on file  No diet orders on file

## 2024-02-08 NOTE — NURSING
Critical Lactic of 4.3. Marc UREÑA notified. Orders to give 1L LR bolus, and start continuous LR infusion at 100 ml/hr.

## 2024-02-09 ENCOUNTER — DOCUMENTATION ONLY (OUTPATIENT)
Dept: NEUROLOGY | Facility: CLINIC | Age: 44
End: 2024-02-09
Payer: MEDICAID

## 2024-02-09 LAB
ALBUMIN SERPL BCP-MCNC: 2.8 G/DL (ref 3.5–5.2)
ALLENS TEST: ABNORMAL
ALP SERPL-CCNC: 50 U/L (ref 55–135)
ALT SERPL W/O P-5'-P-CCNC: 15 U/L (ref 10–44)
ANION GAP SERPL CALC-SCNC: 8 MMOL/L (ref 8–16)
AST SERPL-CCNC: 36 U/L (ref 10–40)
BASOPHILS # BLD AUTO: 0.06 K/UL (ref 0–0.2)
BASOPHILS NFR BLD: 0.7 % (ref 0–1.9)
BILIRUB SERPL-MCNC: 0.4 MG/DL (ref 0.1–1)
BUN SERPL-MCNC: 13 MG/DL (ref 6–20)
CALCIUM SERPL-MCNC: 8.7 MG/DL (ref 8.7–10.5)
CHLORIDE SERPL-SCNC: 111 MMOL/L (ref 95–110)
CK BB CFR SERPL ELPH: 0 %
CK MB CFR SERPL ELPH: 0 % (ref 0–3.3)
CK MM CFR SERPL ELPH: 100 % (ref 96.7–100)
CK SERPL-CCNC: 737 U/L (ref 30–223)
CO2 SERPL-SCNC: 24 MMOL/L (ref 23–29)
CREAT SERPL-MCNC: 0.9 MG/DL (ref 0.5–1.4)
DIFFERENTIAL METHOD BLD: ABNORMAL
EOSINOPHIL # BLD AUTO: 0.5 K/UL (ref 0–0.5)
EOSINOPHIL NFR BLD: 6 % (ref 0–8)
ERYTHROCYTE [DISTWIDTH] IN BLOOD BY AUTOMATED COUNT: 12.6 % (ref 11.5–14.5)
EST. GFR  (NO RACE VARIABLE): >60 ML/MIN/1.73 M^2
GLUCOSE SERPL-MCNC: 119 MG/DL (ref 70–110)
HCO3 UR-SCNC: 27.5 MMOL/L (ref 24–28)
HCT VFR BLD AUTO: 37.7 % (ref 40–54)
HGB BLD-MCNC: 12.2 G/DL (ref 14–18)
IMM GRANULOCYTES # BLD AUTO: 0.02 K/UL (ref 0–0.04)
IMM GRANULOCYTES NFR BLD AUTO: 0.2 % (ref 0–0.5)
LACTATE SERPL-SCNC: 0.9 MMOL/L (ref 0.5–2.2)
LYMPHOCYTES # BLD AUTO: 1 K/UL (ref 1–4.8)
LYMPHOCYTES NFR BLD: 10.6 % (ref 18–48)
MAGNESIUM SERPL-MCNC: 1.8 MG/DL (ref 1.6–2.6)
MCH RBC QN AUTO: 28.6 PG (ref 27–31)
MCHC RBC AUTO-ENTMCNC: 32.4 G/DL (ref 32–36)
MCV RBC AUTO: 88 FL (ref 82–98)
MONOCYTES # BLD AUTO: 1.1 K/UL (ref 0.3–1)
MONOCYTES NFR BLD: 12.2 % (ref 4–15)
NEUTROPHILS # BLD AUTO: 6.3 K/UL (ref 1.8–7.7)
NEUTROPHILS NFR BLD: 70.3 % (ref 38–73)
NRBC BLD-RTO: 0 /100 WBC
PCO2 BLDA: 43.3 MMHG (ref 35–45)
PH SMN: 7.41 [PH] (ref 7.35–7.45)
PHOSPHATE SERPL-MCNC: 2.7 MG/DL (ref 2.7–4.5)
PLATELET # BLD AUTO: 286 K/UL (ref 150–450)
PMV BLD AUTO: 9.4 FL (ref 9.2–12.9)
PO2 BLDA: 99 MMHG (ref 80–100)
POC BE: 3 MMOL/L
POC SATURATED O2: 98 % (ref 95–100)
POC TCO2: 29 MMOL/L (ref 23–27)
POTASSIUM SERPL-SCNC: 3.9 MMOL/L (ref 3.5–5.1)
PROT SERPL-MCNC: 6 G/DL (ref 6–8.4)
RBC # BLD AUTO: 4.27 M/UL (ref 4.6–6.2)
SAMPLE: ABNORMAL
SITE: ABNORMAL
SODIUM SERPL-SCNC: 143 MMOL/L (ref 136–145)
WBC # BLD AUTO: 8.97 K/UL (ref 3.9–12.7)

## 2024-02-09 PROCEDURE — 90792 PSYCH DIAG EVAL W/MED SRVCS: CPT | Mod: AF,HB,, | Performed by: PSYCHIATRY & NEUROLOGY

## 2024-02-09 PROCEDURE — 25000003 PHARM REV CODE 250

## 2024-02-09 PROCEDURE — 80053 COMPREHEN METABOLIC PANEL: CPT | Performed by: NURSE PRACTITIONER

## 2024-02-09 PROCEDURE — 63600175 PHARM REV CODE 636 W HCPCS

## 2024-02-09 PROCEDURE — 27100171 HC OXYGEN HIGH FLOW UP TO 24 HOURS

## 2024-02-09 PROCEDURE — 99233 SBSQ HOSP IP/OBS HIGH 50: CPT | Mod: FS,,, | Performed by: PSYCHIATRY & NEUROLOGY

## 2024-02-09 PROCEDURE — 63600175 PHARM REV CODE 636 W HCPCS: Performed by: STUDENT IN AN ORGANIZED HEALTH CARE EDUCATION/TRAINING PROGRAM

## 2024-02-09 PROCEDURE — 94761 N-INVAS EAR/PLS OXIMETRY MLT: CPT | Mod: XB

## 2024-02-09 PROCEDURE — 83605 ASSAY OF LACTIC ACID: CPT

## 2024-02-09 PROCEDURE — 36600 WITHDRAWAL OF ARTERIAL BLOOD: CPT

## 2024-02-09 PROCEDURE — 99900035 HC TECH TIME PER 15 MIN (STAT)

## 2024-02-09 PROCEDURE — 99499 UNLISTED E&M SERVICE: CPT | Mod: ,,, | Performed by: PHYSICIAN ASSISTANT

## 2024-02-09 PROCEDURE — 83735 ASSAY OF MAGNESIUM: CPT | Performed by: NURSE PRACTITIONER

## 2024-02-09 PROCEDURE — 84100 ASSAY OF PHOSPHORUS: CPT | Performed by: NURSE PRACTITIONER

## 2024-02-09 PROCEDURE — 85025 COMPLETE CBC W/AUTO DIFF WBC: CPT | Performed by: NURSE PRACTITIONER

## 2024-02-09 PROCEDURE — 94003 VENT MGMT INPAT SUBQ DAY: CPT

## 2024-02-09 PROCEDURE — 20000000 HC ICU ROOM

## 2024-02-09 PROCEDURE — 99900026 HC AIRWAY MAINTENANCE (STAT)

## 2024-02-09 PROCEDURE — 25000003 PHARM REV CODE 250: Performed by: PSYCHIATRY & NEUROLOGY

## 2024-02-09 PROCEDURE — 63600175 PHARM REV CODE 636 W HCPCS: Performed by: INTERNAL MEDICINE

## 2024-02-09 PROCEDURE — 82803 BLOOD GASES ANY COMBINATION: CPT

## 2024-02-09 RX ORDER — TALC
6 POWDER (GRAM) TOPICAL NIGHTLY
Status: DISCONTINUED | OUTPATIENT
Start: 2024-02-09 | End: 2024-02-13 | Stop reason: HOSPADM

## 2024-02-09 RX ORDER — CLONIDINE HYDROCHLORIDE 0.1 MG/1
0.1 TABLET ORAL DAILY
Status: DISCONTINUED | OUTPATIENT
Start: 2024-02-09 | End: 2024-02-10

## 2024-02-09 RX ORDER — DEXMEDETOMIDINE HYDROCHLORIDE 4 UG/ML
INJECTION, SOLUTION INTRAVENOUS
Status: DISCONTINUED
Start: 2024-02-09 | End: 2024-02-09

## 2024-02-09 RX ORDER — NICARDIPINE HYDROCHLORIDE 0.2 MG/ML
0-15 INJECTION INTRAVENOUS CONTINUOUS
Status: DISCONTINUED | OUTPATIENT
Start: 2024-02-09 | End: 2024-02-11

## 2024-02-09 RX ORDER — DEXMEDETOMIDINE HYDROCHLORIDE 4 UG/ML
0-1.4 INJECTION, SOLUTION INTRAVENOUS CONTINUOUS
Status: DISCONTINUED | OUTPATIENT
Start: 2024-02-09 | End: 2024-02-11

## 2024-02-09 RX ORDER — LORAZEPAM 2 MG/ML
INJECTION INTRAMUSCULAR
Status: DISCONTINUED
Start: 2024-02-09 | End: 2024-02-09

## 2024-02-09 RX ORDER — LORAZEPAM 2 MG/ML
2 INJECTION INTRAMUSCULAR ONCE
Status: COMPLETED | OUTPATIENT
Start: 2024-02-09 | End: 2024-02-09

## 2024-02-09 RX ORDER — HALOPERIDOL 5 MG/ML
5 INJECTION INTRAMUSCULAR EVERY 5 MIN PRN
Status: DISCONTINUED | OUTPATIENT
Start: 2024-02-09 | End: 2024-02-09

## 2024-02-09 RX ORDER — NICARDIPINE HYDROCHLORIDE 0.2 MG/ML
INJECTION INTRAVENOUS
Status: COMPLETED
Start: 2024-02-09 | End: 2024-02-09

## 2024-02-09 RX ORDER — DEXMEDETOMIDINE HYDROCHLORIDE 4 UG/ML
0-1.4 INJECTION, SOLUTION INTRAVENOUS CONTINUOUS
Status: DISCONTINUED | OUTPATIENT
Start: 2024-02-09 | End: 2024-02-09

## 2024-02-09 RX ORDER — LORAZEPAM 2 MG/ML
2 INJECTION INTRAMUSCULAR
Status: DISCONTINUED | OUTPATIENT
Start: 2024-02-09 | End: 2024-02-13 | Stop reason: HOSPADM

## 2024-02-09 RX ADMIN — DEXMEDETOMIDINE HYDROCHLORIDE 1.2 MCG/KG/HR: 4 INJECTION INTRAVENOUS at 12:02

## 2024-02-09 RX ADMIN — NICARDIPINE HYDROCHLORIDE 5 MG/HR: 0.2 INJECTION, SOLUTION INTRAVENOUS at 01:02

## 2024-02-09 RX ADMIN — NICARDIPINE HYDROCHLORIDE 40 MG: 0.2 INJECTION, SOLUTION INTRAVENOUS at 08:02

## 2024-02-09 RX ADMIN — DEXMEDETOMIDINE HYDROCHLORIDE 0.2 MCG/KG/HR: 4 INJECTION INTRAVENOUS at 09:02

## 2024-02-09 RX ADMIN — DEXMEDETOMIDINE HYDROCHLORIDE 1.2 MCG/KG/HR: 4 INJECTION INTRAVENOUS at 09:02

## 2024-02-09 RX ADMIN — HYDRALAZINE HYDROCHLORIDE 10 MG: 20 INJECTION, SOLUTION INTRAMUSCULAR; INTRAVENOUS at 08:02

## 2024-02-09 RX ADMIN — ENOXAPARIN SODIUM 40 MG: 40 INJECTION SUBCUTANEOUS at 06:02

## 2024-02-09 RX ADMIN — LABETALOL HYDROCHLORIDE 10 MG: 5 INJECTION, SOLUTION INTRAVENOUS at 08:02

## 2024-02-09 RX ADMIN — ACETAMINOPHEN 650 MG: 325 TABLET ORAL at 06:02

## 2024-02-09 RX ADMIN — HALOPERIDOL LACTATE 5 MG: 5 INJECTION, SOLUTION INTRAMUSCULAR at 09:02

## 2024-02-09 RX ADMIN — DEXMEDETOMIDINE HYDROCHLORIDE 1.2 MCG/KG/HR: 4 INJECTION INTRAVENOUS at 03:02

## 2024-02-09 RX ADMIN — MELATONIN TAB 3 MG 6 MG: 3 TAB at 09:02

## 2024-02-09 RX ADMIN — LORAZEPAM 2 MG: 2 INJECTION INTRAMUSCULAR; INTRAVENOUS at 09:02

## 2024-02-09 RX ADMIN — LORAZEPAM 2 MG: 2 INJECTION INTRAMUSCULAR at 09:02

## 2024-02-09 RX ADMIN — PROPOFOL 25 MCG/KG/MIN: 10 INJECTION, EMULSION INTRAVENOUS at 12:02

## 2024-02-09 RX ADMIN — CLONIDINE HYDROCHLORIDE 0.1 MG: 0.1 TABLET ORAL at 09:02

## 2024-02-09 RX ADMIN — PROPOFOL 20 MCG/KG/MIN: 10 INJECTION, EMULSION INTRAVENOUS at 07:02

## 2024-02-09 NOTE — NURSING
Pt extremely agitated. Pt stating that his mother puts blood in his food and to call the [employer]. Pt attempting to get out of bed and yelling to call the police on his mother. Pt spitting on bed. 2 mg of ativan given for agitation and Haldol given. Precedex restarted.

## 2024-02-09 NOTE — PLAN OF CARE
[facility] Care Plan    POC reviewed with Nick Moffett Jr and family at 0300. Pt verbalized understanding. Questions and concerns addressed. No acute events overnight. Pt progressing toward goals. Will continue to monitor. See below and flowsheets for full assessment and VS info.     Propofol and precedex titrated   Labs WDL      Is this a stroke patient? no    Neuro:  [place] Coma Scale  Best Eye Response: 3-->(E3) to speech  Best Motor Response: 6-->(M6) obeys commands  Best Verbal Response: 1-->(V1) none  [place] Coma Scale Score: 10  Assessment Qualifiers: patient intubated, patient chemically sedated or paralyzed  Pupil PERRLA: yes     24hr Temp:  [97 °F (36.1 °C)-98 °F (36.7 °C)]     CV:   Rhythm: sinus tachycardia  BP goals:   SBP < 180  MAP > 65    Resp:      Vent Mode: A/C  Set Rate: 16 BPM  Oxygen Concentration (%): 40  Vt Set: 450 mL  PEEP/CPAP: 5 cmH20  Pressure Support: 5 cmH20    Plan: N/A    GI/:     Diet/Nutrition Received: NPO  Last Bowel Movement: 02/06/24  Voiding Characteristics: urethral catheter (bladder)    Intake/Output Summary (Last 24 hours) at 2/9/2024 0551  Last data filed at 2/9/2024 0500  Gross per 24 hour   Intake 3520.99 ml   Output 2780 ml   Net 740.99 ml     Unmeasured Output  Stool Occurrence: 0    Labs/Accuchecks:  Recent Labs   Lab 02/09/24  0100   WBC 8.97   RBC 4.27*   HGB 12.2*   HCT 37.7*         Recent Labs   Lab 02/09/24  0100      K 3.9   CO2 24   *   BUN 13   CREATININE 0.9   ALKPHOS 50*   ALT 15   AST 36   BILITOT 0.4      Recent Labs   Lab 02/06/24  2110   INR 0.9   APTT 27.1      Recent Labs   Lab 02/07/24  0542 02/07/24  0900 02/07/24  1934   CPK 1072*  --   --    TROPONINI 0.590*   < > 0.815*    < > = values in this interval not displayed.       Electrolytes: N/A - electrolytes WDL  Accuchecks: none    Gtts:   dexmedeTOMIDine (Precedex) infusion (titrating) 0.8 mcg/kg/hr (02/09/24 0500)    lactated ringers 100 mL/hr at 02/09/24 0500    propofoL 20  mcg/kg/min (02/09/24 0500)       LDA/Wounds:    Nurses Note -- 4 Eyes      2/9/2024   5:51 AM      Skin assessed during: Daily Assessment    Is there altered skin present? yes     [x] Yes- Altered Skin Integrity Present or Discovered   [] LDA Added if Not in Epic (Describe Wound)   [] New Altered Skin Integrity was Present on Admit and Documented in LDA   [] Wound Image Taken    Wound Care Consulted? yes  Attending Nurse:  Shorty BLOCK     Second RN/Staff Member:  Regis BLOCK    [facility]

## 2024-02-09 NOTE — SUBJECTIVE & OBJECTIVE
Interval History:  As above.    Review of Systems   Constitutional:  Negative for fatigue and fever.   Eyes:  Negative for photophobia and visual disturbance.   Respiratory:  Negative for shortness of breath.    Cardiovascular:  Negative for chest pain.   Gastrointestinal:  Negative for nausea and vomiting.   Psychiatric/Behavioral:  Positive for agitation and confusion. The patient is nervous/anxious and is hyperactive.        Objective:     Vitals:  Temp: 97.8 °F (36.6 °C)  Pulse: 104  Rhythm: normal sinus rhythm  BP: (!) 159/73  MAP (mmHg): 105  Resp: (!) 25  SpO2: 96 %  Oxygen Concentration (%): (S) 36  Vent Mode: A/C  Set Rate: 16 BPM  Vt Set: 450 mL  PEEP/CPAP: 5 cmH20  Peak Airway Pressure: 26 cmH20  Mean Airway Pressure: 13 cmH20  Plateau Pressure: 0 cmH20    Temp  Min: 97 °F (36.1 °C)  Max: 98 °F (36.7 °C)  Pulse  Min: 67  Max: 102  BP  Min: 115/62  Max: 170/81  MAP (mmHg)  Min: 83  Max: 118  Resp  Min: 16  Max: 33  SpO2  Min: 75 %  Max: 100 %  Oxygen Concentration (%)  Min: 40  Max: 40    02/08 0701 - 02/09 0700  In: 3372.3 [I.V.:2236.8]  Out: 2750 [Urine:2500; Drains:250]   Unmeasured Output  Stool Occurrence: 0        Physical Exam  Vitals and nursing note reviewed.   Constitutional:       General: He is in acute distress.      Interventions: He is restrained.   HENT:      Head: Normocephalic and atraumatic.   Eyes:      Extraocular Movements: Extraocular movements intact.      Pupils: Pupils are equal, round, and reactive to light.   Cardiovascular:      Rate and Rhythm: Regular rhythm. Tachycardia present.   Pulmonary:      Effort: Pulmonary effort is normal.      Breath sounds: No stridor.      Comments: Intubated and mechanically ventilated   Abdominal:      Comments: Distended    Musculoskeletal:         General: Normal range of motion.      Cervical back: Full passive range of motion without pain.   Skin:     General: Skin is dry.   Neurological:      Mental Status: He is alert.      Comments:    [id number]  Alert, agitated and unable to fully test orientation due to patient being uncooperative   Yelling at family at bedside  PERRL  Face grossly symmetric  Moves all extremities to command and with spontaneous purposeful movement    Psychiatric:         Mood and Affect: Affect is angry.         Behavior: Behavior is agitated.         Thought Content: Thought content is paranoid.       Unable to test orientation, language, memory, judgment, insight, fund of knowledge, due to clinical status.        Medications:  ContinuousdexmedeTOMIDine (Precedex) infusion (titrating), Last Rate: 0.8 mcg/kg/hr (02/09/24 0500)  lactated ringers, Last Rate: 100 mL/hr at 02/09/24 0500  propofoL, Last Rate: 20 mcg/kg/min (02/09/24 0714)    ScheduledcefTRIAXone (Rocephin) IV (PEDS and ADULTS), 2 g, Q24H  enoxparin, 40 mg, Q24H (prophylaxis, 1700)  famotidine, 20 mg, BID  senna-docusate 8.6-50 mg, 1 tablet, Daily    PRNacetaminophen, 650 mg, Q6H PRN  dextrose 10%, 12.5 g, PRN  dextrose 10%, 25 g, PRN  glucagon (human recombinant), 1 mg, PRN  glucose, 16 g, PRN  glucose, 24 g, PRN  hydrALAZINE, 10 mg, Q6H PRN  insulin aspart U-100, 0-5 Units, QID (AC + HS) PRN  labetalol, 10 mg, Q4H PRN  magnesium oxide, 800 mg, PRN  magnesium oxide, 800 mg, PRN  ondansetron, 8 mg, Q8H PRN  potassium bicarbonate, 35 mEq, PRN  potassium bicarbonate, 50 mEq, PRN  potassium bicarbonate, 60 mEq, PRN  potassium, sodium phosphates, 2 packet, PRN  potassium, sodium phosphates, 2 packet, PRN  potassium, sodium phosphates, 2 packet, PRN      Today I personally reviewed pertinent medications, lines/drains/airways, imaging, cardiology results, laboratory results, microbiology results, notably:    Estimated Creatinine Clearance: 131.9 mL/min (based on SCr of 0.9 mg/dL).    Diet  No diet orders on file  No diet orders on file

## 2024-02-09 NOTE — ASSESSMENT & PLAN NOTE
BUN 17, Cr 1.62 on admission  Hourly I/O  IVF  Daily CMP  Avoid nephrotoxic medications and renally dose as appropriate  Estimated Creatinine Clearance: 131.9 mL/min (based on SCr of 0.9 mg/dL).

## 2024-02-09 NOTE — PROCEDURES
DATE: 2/8/2024    EEG NUMBER: FH -2    REFERRING PHYSICIAN:  Dr. Chahal      This EEG was performed to assess for subclinical seizures      ELECTROENCEPHALOGRAM REPORT     METHODOLOGY:  Electroencephalographic (EEG) recording is with electrodes placed according to the International 10-20 placement system.  Thirty two (32) channels of digital signal are simultaneously recorded from the scalp and may include EKG, EMG, and/or eye monitors.   Recording band pass was 0.1 to 512 hz.  Digital video recording of the patient is simultaneously recorded with the EEG.  The nursing staff report clinical symptoms and may press an event button when the patient has symptoms of clinical interest to the treating physicians.  EEG and video recording is stored and archived in digital format.  The entire recording is visually reviewed, and the times identified by computer analysis as being spikes or seizures are reviewed again.  Activation procedures which include photic stimulation, hyperventilation and instructing patients to perform simple task are done in selected patients.   Compresses spectral analysis (CSA) is also performed on the activity recorded from each individual channel.  This is displayed as a power display of frequencies from 0 to 30 Hz over time.   The CSA analysis is done and displayed continuously.  This is reviewed for asymmetries in power between homologous areas of the scalp and for presence of changes in power which can be seen when seizures occur.  Sections of suspected abnormalities on the CSA is then compared with the original EEG recording.                [employer] software was also utilized in the review of this study.  This software suite analyzes the EEG recording in multiple domains.  Coherence and rhythmicity is computed to identify EEG sections which may contain organized seizures.  Each channel undergoes analysis to detect presence of spike and sharp waves which have special and morphological  characteristic of epileptic activity.  The routine EEG recording is converted from spacial into frequency domain.  This is then displayed comparing homologous areas to identify areas of significant asymmetry.  Algorithm to identify non-cortically generated artifact is used to separate eye movement, EMG and other artifact from the EEG.     Recording times   Start on February 8, 2024 at hour 7 minute 1 seconds 2  End on February 9, 2024 at hours 7 minute 0 seconds 2   The total time of EEG recording this study was 23 hours and 58 minutes    EEG FINDINGS:  The recording was obtained with a number of standard bipolar and referential montages during obtunded state.  Diffuse disorganized low amplitude mixed theta  and occasional delta range slowing was noted which was symmetric.  The background was intermixed with symmetric spindles.  Intermittent photic stimulation failed to alter the background.  Variability and reactivity were noted.  There were no interictal epileptiform abnormalities and no clinical or electrographic seizures were recorded.  Prolonged runs of generalized rhythmical delta activity were noted    The EKG channel revealed a sinus rhythm.     IMPRESSION:  This is an abnormal EEG during obtunded state.  Diffuse disorganized low-amplitude slowing of the background was noted     CLINICAL CORRELATION:  The patient is a 43-year-old male who is being evaluated for altered sensorium.  He is currently maintained on intravenous infusions of propofol.  This was discontinued during this study. Patient also maintained on Keppra.  This is an abnormal EEG during obtunded state.  The overall degree of disorganization and slowing for given age is suggestive of a moderate to severe encephalopathy, likely a toxic metabolic encephalopathy.  There is no evidence of an epileptic process on this recording.  No seizures were recorded during this study.

## 2024-02-09 NOTE — PROGRESS NOTES
Epilepsy Team     CC: EEG placed for concern for epileptiform activity/seizures     HPI: 43 year old male with a PMHx of HTN and schizophrenia who presented to the ER 2/6 via EMS after found unresponsive by family with agonal respirations, noted to have tremulous movements, transferred to [facility] for higher level of care and admitted to [facility] for further management of encephalopathy of unknown etiology. HPI per chart review as patient currently intubated and sedated. EEG placed 2/7; Epilepsy following for assistance in management.      Unable to obtain patient's family and social history.    Patient is not currently maintained on anti-seizure medication.       Physical Exam  General: Awake, on nasal cannula, NAD.  HEENT: Normocephalic. Atraumatic. EEG in place.  Pulmonary: Effort normal, no respiratory distress.  Cardiac: Normal rate.   Skin: Warm and dry.  Psych: Unable to assess.   Neuro:  Awake, resting comfortably, tracks provider in room  SEBAS OU   Corneal intact OU   + spontaneous eye opening   Face symmetric   Tongue midline   Moves all extremities   Follows simple commands     Exam findings to suggest seizures:  Myoclonus - no   eye twitching - no   Nystagmus - no   gaze deviation - no   waxy rigidity - no       EEG reviewed by Epileptologist, findings include moderate to severe encephalopathy, no epileptiform activity, no electrographic seizures; see EEG report for details.      Encephalopathy  Recommendations: Encephalopathy is nonspecific in regards to etiology. No indication for anti-seizure drug at this time. Recommend discontinuing EEG. Findings and recommendations discussed with NCC team.       Acute respiratory failure with hypercapnia  On mechanically assisted ventilation  - Self-extubated this morning  - Management per NCC     FREDERICK  - Cr 1.6 on admit > 0.9 today  - Management per NCC     Schizophrenia  - Chronic  - Noted to have previous psychiatric hospitalizations  - Management per NCC       We will  remove EEG today and sign off. Please do not hesitate to call us back with any questions or concerns.      Naheed Avendano PA-C  Neurology-Epilepsy  [facility] - [facility]  Staff: Dr. Nielson

## 2024-02-09 NOTE — LOPA/MORA/SWTA/AOC/AEB
[facility] ([facility])  On-Site Evaluation  [facility] Contact # [phone]        Thank you for the referral of this patient to determine suitability for organ, tissue, and eye donation.  A chart review has been conducted 2/9/24  at 1040.    [facility] findings are as follows:    ? Potential candidate for donation/ Referral Closed- Any changes in patients condition, GCS of 5 or less, discussion of withdrawing the vent, brain death exams, or family mention of donation immediately call [phone].      [facility] Representative: LEROY BillN, RN, CCRN    [facility] Referral Number: [id number]

## 2024-02-09 NOTE — PROGRESS NOTES
EEG Disconnect   Electrodes had to be fixed.No    Skin Integrity: Normal     Sarahi Murdock   02/09/2024 10:28 AM

## 2024-02-09 NOTE — MEDICAL/APP STUDENT
CONSULTATION LIAISON PSYCHIATRY INITIAL EVALUATION    Patient Name: Nick Moffett Jr  MRN: [id number]  Patient Class: IP- Inpatient  Admission Date: 2/7/2024  Attending Physician: Je Chahal MD      HPI:   Nick Moffett Jr is a 43 y.o. male with past psychiatric history of *** & past pertinent medical history of *** presents to the ED/admitted to the hospital for Acute encephalopathy    Psychiatry consulted for ***    Psychiatric medication management. Medicine is concerned about possible nms.     Pt was sedated prior to consult. Unable to obtain information from Pt. Pt was oriented to self only. Pt reports that someone is trying to poison him. Pt's mother was able to provide information regarding pt's illness, medication, and social history. Mother reports pt diagnosed with schizophrenia in intermediate. Mother reports pt is schizophrenia is delusional and pt thinks he is a multi-millionaire . Mother reports pt's schizophrenia is not well managed on prior medications received in intermediate. Pt released from intermediate in October after 15 years incarceration for armed robbery. Mother reports pt has been received 3 injections of invega susstena since release from intermediate. Pt dose of invega susstena was increased on most recent dose which was received on January 25, 2024.  Pt started on clonidine 0.1mg nightly on January 25, 2024 as well. Mother reports pt did not show any nms symptoms after receiving invega susstena. UDS was positive for opiates and benzodiazepines.       Collateral with patient's permission:   ***    Medical Review of Systems:  {Review Of Systems:46559}    Psychiatric Review of Systems (is patient experiencing or having changes in):  sleep: ***  appetite: ***  weight: ***  energy/anergy: ***  interest/pleasure/anhedonia: ***  somatic symptoms: ***  libido: ***  anxiety/panic: ***  guilty/hopelessness: ***  concentration: ***  Sariah:***  Psychosis: ***  Trauma: ***  S.I.B.s/risky  "behavior: ***    Past Psychiatric History:  Previous Medication Trials: yes  Previous Psychiatric Hospitalizations:no   Previous Suicide Attempts: ***  History of Violence: yes, ***  Outpatient Psychiatrist: no  Family Psychiatric History:    Substance Abuse History (with emphasis over the last 12 months):  Recreational Drugs: ***  Use of Alcohol: ***  Tobacco Use:***  Rehab History:***    Social History:  Marital Status: not   Children: 0  Employment Status/Info: ***  :no  Education: high school diploma/GED  Special Ed: no  Housing Status: with family  Access to gun: no  Psychosocial Stressors: ***  Functioning Relationships: good support system    Legal History:  Past Charges/Incarcerations: yes  Pending charges:no    Mental Status Exam:  General Appearance: in no acute distress  Behavior: unable to participate, minimal responses  Involuntary Movements and Motor Activity: ***  Gait and Station: unable to assess - patient lying down or seated  Speech and Language: incoherent, responds to questions minimally/briefly  Mood: "***"  Affect: ***  Thought Process and Associations: ***  Thought Content and Perceptions:: ***  Sensorium and Orientation: ***  Recent and Remote Memory: ***  Attention and Concentration: ***  Fund of Knowledge: ***  Insight: ***  Judgment: ***    CAM ICU positive? ***      ASSESSMENT & RECOMMENDATIONS   (Please list each relevant SPECIFIC psychiatric DSMV or medical diagnosis and recs for it under the listing DO NOT WRITE AN IMPRESSION PARAGRAPH!!)    MDD mild/moderate/severe, BIPOLAR I/II, Unspecified mood etc  PSYCH MEDICATIONS  Scheduled  PRN  OR doesn't warrant any med management in the inpatient setting defer to outpatient    JC/panic d/o, adjustment d/o etc  PSYCH MEDICATIONS  Scheduled  PRN  OR doesn't warrant any med management in the inpatient setting defer to outpatient    Schizophrenia, schizoaffective, delusional d/o, acute psychosis etc  PSYCH " MEDICATIONS  Scheduled  PRN  OR doesn't warrant any med management in the inpatient setting defer to outpatient    Dementia, parkinson's, pseudo dementia etc  PSYCH MEDICATIONS  Scheduled  PRN  OR doesn't warrant any med management in the inpatient setting defer to outpatient    DELIRIUM  DELIRIUM BEHAVIOR MANAGEMENT  PLEASE utilize CHEMICAL restraints with PRN meds first for agitation. Minimize use of PHYSICAL restraints OR have periods of being out of physical restraints if possible.  Keep window shades open and room lit during day and room dim at night in order to promote normal sleep-wake cycles  Encourage family at bedside. [place] patient often to situation, location, date.  Continue to Limit or Discontinue use of Narcotics, Benzos and Anti-cholinergic medications as they may worsen delirium.  Continue medical workup for causative etiology of Delirium.     OTHER PERTINENT DIAGNOSIS    RISK ASSESSMENT  NEEDS PEC because patient is in imminent danger of hurting self or others and is gravely disabled. & NEEDS 1:1 sitter  NO NEED FOR PEC patient NOT in any imminent danger of hurting self or others and not gravely disabled.     FOLLOW UP  Will follow up while in house  Will sign off. Patient can follow up with ***/outpatient mental health provider. Resources provided in patient's discharge instructions.    DISPOSITION - once medically cleared:   Seek involuntary inpatient psychiatric admission for stabilization of acute psychiatric symptoms and a safe disposition plan is enacted. The patient &/or their family was informed that the patient will be transferred to an inpatient unit per ED/primary placement team.   OR  Patient may be discharged home with next of kin with outpatient psychaitric follow up/rehab.   OR  Defer to medical team    Please contact ON CALL psychiatry service (24/7) for any acute issues that may arise.    Victor Manuel Capps MS3  CL Psychiatry  [facility]  2/9/2024 9:53  AM        --------------------------------------------------------------------------------------------------------------------------------------------------------------------------------------------------------------------------------------    CONTINUED HISTORY & OBJECTIVE clinical data & findings reviewed and noted for above decision making    Past Medical/Surgical History:   Past Medical History:   Diagnosis Date    Hypertension      Past Surgical History:   Procedure Laterality Date    APPENDECTOMY      difficult airway         Current Medications:   Scheduled Meds:    enoxparin  40 mg Subcutaneous Q24H (prophylaxis, 1700)    lorazepam  2 mg Intravenous Once    senna-docusate 8.6-50 mg  1 tablet Per OG tube Daily     PRN Meds: acetaminophen, dextrose 10%, dextrose 10%, haloperidol lactate, hydrALAZINE, labetalol, lorazepam, magnesium oxide, magnesium oxide, ondansetron, potassium bicarbonate, potassium bicarbonate, potassium bicarbonate, potassium, sodium phosphates, potassium, sodium phosphates, potassium, sodium phosphates    Allergies:   Review of patient's allergies indicates:  No Known Allergies    Vitals  Vitals:    02/09/24 0905   BP: (!) 177/90   Pulse: 96   Resp: (!) 24   Temp:        Labs/Imaging/Studies:  Recent Results (from the past 24 hour(s))   POCT glucose    Collection Time: 02/08/24 11:40 AM   Result Value Ref Range    POCT Glucose 104 70 - 110 mg/dL   RPR    Collection Time: 02/08/24 11:41 AM   Result Value Ref Range    RPR Non-reactive Non-reactive   Lactic acid, plasma    Collection Time: 02/08/24  1:44 PM   Result Value Ref Range    Lactate (Lactic Acid) 1.0 0.5 - 2.2 mmol/L   POCT glucose    Collection Time: 02/08/24  5:36 PM   Result Value Ref Range    POCT Glucose 120 (H) 70 - 110 mg/dL   Lactic acid, plasma    Collection Time: 02/08/24  8:55 PM   Result Value Ref Range    Lactate (Lactic Acid) 0.9 0.5 - 2.2 mmol/L   Comprehensive metabolic panel    Collection Time: 02/09/24  1:00 AM    Result Value Ref Range    Sodium 143 136 - 145 mmol/L    Potassium 3.9 3.5 - 5.1 mmol/L    Chloride 111 (H) 95 - 110 mmol/L    CO2 24 23 - 29 mmol/L    Glucose 119 (H) 70 - 110 mg/dL    BUN 13 6 - 20 mg/dL    Creatinine 0.9 0.5 - 1.4 mg/dL    Calcium 8.7 8.7 - 10.5 mg/dL    Total Protein 6.0 6.0 - 8.4 g/dL    Albumin 2.8 (L) 3.5 - 5.2 g/dL    Total Bilirubin 0.4 0.1 - 1.0 mg/dL    Alkaline Phosphatase 50 (L) 55 - 135 U/L    AST 36 10 - 40 U/L    ALT 15 10 - 44 U/L    eGFR >60.0 >60 mL/min/1.73 m^2    Anion Gap 8 8 - 16 mmol/L   CBC auto differential    Collection Time: 02/09/24  1:00 AM   Result Value Ref Range    WBC 8.97 3.90 - 12.70 K/uL    RBC 4.27 (L) 4.60 - 6.20 M/uL    Hemoglobin 12.2 (L) 14.0 - 18.0 g/dL    Hematocrit 37.7 (L) 40.0 - 54.0 %    MCV 88 82 - 98 fL    MCH 28.6 27.0 - 31.0 pg    MCHC 32.4 32.0 - 36.0 g/dL    RDW 12.6 11.5 - 14.5 %    Platelets 286 150 - 450 K/uL    MPV 9.4 9.2 - 12.9 fL    Immature Granulocytes 0.2 0.0 - 0.5 %    Gran # (ANC) 6.3 1.8 - 7.7 K/uL    Immature Grans (Abs) 0.02 0.00 - 0.04 K/uL    Lymph # 1.0 1.0 - 4.8 K/uL    Mono # 1.1 (H) 0.3 - 1.0 K/uL    Eos # 0.5 0.0 - 0.5 K/uL    Baso # 0.06 0.00 - 0.20 K/uL    nRBC 0 0 /100 WBC    Gran % 70.3 38.0 - 73.0 %    Lymph % 10.6 (L) 18.0 - 48.0 %    Mono % 12.2 4.0 - 15.0 %    Eosinophil % 6.0 0.0 - 8.0 %    Basophil % 0.7 0.0 - 1.9 %    Differential Method Automated    Magnesium    Collection Time: 02/09/24  1:00 AM   Result Value Ref Range    Magnesium 1.8 1.6 - 2.6 mg/dL   Phosphorus    Collection Time: 02/09/24  1:00 AM   Result Value Ref Range    Phosphorus 2.7 2.7 - 4.5 mg/dL   Lactic acid, plasma    Collection Time: 02/09/24  1:00 AM   Result Value Ref Range    Lactate (Lactic Acid) 0.9 0.5 - 2.2 mmol/L   ISTAT PROCEDURE    Collection Time: 02/09/24  3:09 AM   Result Value Ref Range    POC PH 7.411 7.35 - 7.45    POC PCO2 43.3 35 - 45 mmHg    POC PO2 99 80 - 100 mmHg    POC HCO3 27.5 24 - 28 mmol/L    POC BE 3 (H) -2 to 2  mmol/L    POC SATURATED O2 98 95 - 100 %    POC TCO2 29 (H) 23 - 27 mmol/L    Sample ARTERIAL     Site Micaela/UAC     Allens Test N/A
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
## 2024-02-09 NOTE — PROGRESS NOTES
"[facility] - Neuro Critical Care  Neurocritical Care  Progress Note    Admit Date: 2/7/2024  Service Date: 02/09/2024  Length of Stay: 2    Subjective:     Chief Complaint: Acute encephalopathy    History of Present Illness: Mr. Nick Moffett  Is 43-year-old male with a PMHx of HTN and schizophrenia who presents to [facility] after being found down on the porch by family members with unknown down time. Upon EMS arrival, agonal respirations with frothy sputum and pinpoint pupils were noted. Narcan was administered and pupils reportedly midpoint but remained non-reactive. .  Agonal respirations frothy sputum noted at the mouth. He also appeared to be "clenching up," concerning for fever activity. He was brought to [facility] where he was intubated for airway protection after he was noted to be severely hypercarbic in the low 70s and loaded with 2 mg ativan and 3 g keppra. Utox (+) for benzos and opioids.  Tmax at [facility] at 100.8 with WBC 20. He was administered a one time dose of both vancomycin and cefepime but does not appear to have been cultured prior to transfer.   He was [place] [facility] [place] [facility]  [place] neuro-monitoring and a higher level of care.      Hospital Course: 02/08/2024 EEG with diffuse disorganized low-amplitude slowing, no seizure activity. Weaning propofol and precedex. SBT failed due to acute desaturation, likely due to tachypnea, continue ventilator wean as tolerated.   02/09/2024 Self extubation in AM, O2 saturation stable and without evidence of respiratory distress. Able to vocalize and with strong cough. Post extubation, patient agitated, accusing medical team and family members of lying and attempting to admit him to a psychiatric facility. Precedex started. Psych consulted for medication management in setting of presumed NMS.    Interval History:  As above.    Review of Systems   Constitutional:  Negative for fatigue and fever.   Eyes:  Negative for photophobia and visual disturbance. "   Respiratory:  Negative for shortness of breath.    Cardiovascular:  Negative for chest pain.   Gastrointestinal:  Negative for nausea and vomiting.   Psychiatric/Behavioral:  Positive for agitation and confusion. The patient is nervous/anxious and is hyperactive.        Objective:     Vitals:  Temp: 97.8 °F (36.6 °C)  Pulse: 104  Rhythm: normal sinus rhythm  BP: (!) 159/73  MAP (mmHg): 105  Resp: (!) 25  SpO2: 96 %  Oxygen Concentration (%): (S) 36  Vent Mode: A/C  Set Rate: 16 BPM  Vt Set: 450 mL  PEEP/CPAP: 5 cmH20  Peak Airway Pressure: 26 cmH20  Mean Airway Pressure: 13 cmH20  Plateau Pressure: 0 cmH20    Temp  Min: 97 °F (36.1 °C)  Max: 98 °F (36.7 °C)  Pulse  Min: 67  Max: 102  BP  Min: 115/62  Max: 170/81  MAP (mmHg)  Min: 83  Max: 118  Resp  Min: 16  Max: 33  SpO2  Min: 75 %  Max: 100 %  Oxygen Concentration (%)  Min: 40  Max: 40    02/08 0701 - 02/09 0700  In: 3372.3 [I.V.:2236.8]  Out: 2750 [Urine:2500; Drains:250]   Unmeasured Output  Stool Occurrence: 0        Physical Exam  Vitals and nursing note reviewed.   Constitutional:       General: He is in acute distress.      Interventions: He is restrained.   HENT:      Head: Normocephalic and atraumatic.   Eyes:      Extraocular Movements: Extraocular movements intact.      Pupils: Pupils are equal, round, and reactive to light.   Cardiovascular:      Rate and Rhythm: Regular rhythm. Tachycardia present.   Pulmonary:      Effort: Pulmonary effort is normal.      Breath sounds: No stridor.      Comments: Intubated and mechanically ventilated   Abdominal:      Comments: Distended    Musculoskeletal:         General: Normal range of motion.      Cervical back: Full passive range of motion without pain.   Skin:     General: Skin is dry.   Neurological:      Mental Status: He is alert.      Comments:   [id number]  Alert, agitated and unable to fully test orientation due to patient being uncooperative   Yelling at family at bedside  PERRL  Face grossly  symmetric  Moves all extremities to command and with spontaneous purposeful movement    Psychiatric:         Mood and Affect: Affect is angry.         Behavior: Behavior is agitated.         Thought Content: Thought content is paranoid.       Unable to test orientation, language, memory, judgment, insight, fund of knowledge, due to clinical status.        Medications:  ContinuousdexmedeTOMIDine (Precedex) infusion (titrating), Last Rate: 0.8 mcg/kg/hr (02/09/24 0500)  lactated ringers, Last Rate: 100 mL/hr at 02/09/24 0500  propofoL, Last Rate: 20 mcg/kg/min (02/09/24 0714)    ScheduledcefTRIAXone (Rocephin) IV (PEDS and ADULTS), 2 g, Q24H  enoxparin, 40 mg, Q24H (prophylaxis, 1700)  famotidine, 20 mg, BID  senna-docusate 8.6-50 mg, 1 tablet, Daily    PRNacetaminophen, 650 mg, Q6H PRN  dextrose 10%, 12.5 g, PRN  dextrose 10%, 25 g, PRN  glucagon (human recombinant), 1 mg, PRN  glucose, 16 g, PRN  glucose, 24 g, PRN  hydrALAZINE, 10 mg, Q6H PRN  insulin aspart U-100, 0-5 Units, QID (AC + HS) PRN  labetalol, 10 mg, Q4H PRN  magnesium oxide, 800 mg, PRN  magnesium oxide, 800 mg, PRN  ondansetron, 8 mg, Q8H PRN  potassium bicarbonate, 35 mEq, PRN  potassium bicarbonate, 50 mEq, PRN  potassium bicarbonate, 60 mEq, PRN  potassium, sodium phosphates, 2 packet, PRN  potassium, sodium phosphates, 2 packet, PRN  potassium, sodium phosphates, 2 packet, PRN      Today I personally reviewed pertinent medications, lines/drains/airways, imaging, cardiology results, laboratory results, microbiology results, notably:    Estimated Creatinine Clearance: 131.9 mL/min (based on SCr of 0.9 mg/dL).    Diet  No diet orders on file  No diet orders on file      Assessment/Plan:     Neuro  * Acute encephalopathy  43M found down after an unknown amount of time. Pupils fixed and pinpoint upon arrival to ED and became midpoint after narcan administration. Utox (+) opioids and benzos. OHS reported UE jerking for which he was given 2 mg ativan and  3 keppra prior to transfer.    - Continued admission to [facility]  - Toxicology consulted, appreciate assistance   - Psychiatry consulted  - Q1h neurochecks while in ICU  - Q1h vitals while in ICU  - EEG without seizure activity  - SBP <180  - EKG reviewed  - Daily CMP, Mag, Phos - replete electrolytes PRN  - Daily CBC, transfuse PRN  - Lovenox  - PT/OT/SLP as appropriate  - CM/SW consult for dispo planning    Psychiatric  Schizophrenia  Hx previous hospitalizations for exacerbations characterized by grandiose thinking, audiovisual hallucinations, +/- violent ideation    Per mother receives Invega Sustena injections q28 days d/t adherence issue with daily meds  Per chart review receives Invega Sustena     (Prior medication regimen: Geodon 60 daily, Zyprexa 50 daily, Seroquel 50 daily)    Psychiatry consulted  Precedex    Pulmonary  On mechanically assisted ventilation  See Acute respiratory failure with hypercapnia     Acute respiratory failure with hypercapnia  Extubated 2/9    Cardiac/Vascular  Elevated troponin  Mildly elevated troponin at [facility]  EKG just with nonspecific ST and T wave abnormality, suspect demand ischemia    LVH (left ventricular hypertrophy) due to hypertensive disease, without heart failure  Noted on EKG  Echo with LV concentric remodeling, mildly depressed EF (55-60%)    Hypertension  SBP in 200s at [facility], SBP < 180  PRN labetalol, hydralazine    Renal/  Respiratory acidosis with metabolic acidosis  See Acute respiratory failure with hypercapnia     FREDERICK (acute kidney injury)  BUN 17, Cr 1.62 on admission  Hourly I/O  IVF  Daily CMP  Avoid nephrotoxic medications and renally dose as appropriate  Estimated Creatinine Clearance: 131.9 mL/min (based on SCr of 0.9 mg/dL).    GI  Abdominal rigidity  NG to low intermittant suction  CT ABD/Pelvis without acute change     Hepatitis C antibody test positive  Noted Hep C Ab (+) 10/06/2023    Other  Elevated lactic acid level  Lactic acid level 11 at [facility], repeat  2.6, trending  Blood cx, sputum cx pending, broad spec abx  UA not concerning for UTI      Tobacco abuse  Per chart review  Cessation counseling as appropriate          The patient is being Prophylaxed for:  Venous Thromboembolism with: Chemical  Stress Ulcer with: Not Applicable   Ventilator Pneumonia with: not applicable    Activity Orders            Diet Adult Regular (IDDSI Level 7) Isolation Tray - Styrofoam: Regular starting at 02/09 1153    Turn patient every 2 hours starting at 02/08 0400    Progressive Mobility Protocol (mobilize patient to their highest level of functioning at least twice daily) starting at 02/07 2000          Full Code    Shorty Petersen MD  Neurocritical Care  [facility] - Neuro Critical Care

## 2024-02-09 NOTE — RESPIRATORY THERAPY
Patient self extubated while restrained and sedated.  Patient is  on 4 liters nasal cannula with a SPO2 of 94%. Will continue to monitor.

## 2024-02-09 NOTE — ASSESSMENT & PLAN NOTE
Hx previous hospitalizations for exacerbations characterized by grandiose thinking, audiovisual hallucinations, +/- violent ideation    Per mother receives Invega Sustena injections q28 days d/t adherence issue with daily meds  Per chart review receives Invega Sustena     (Prior medication regimen: Geodon 60 daily, Zyprexa 50 daily, Seroquel 50 daily)    Psychiatry consulted  Precedex

## 2024-02-09 NOTE — ASSESSMENT & PLAN NOTE
43M found down after an unknown amount of time. Pupils fixed and pinpoint upon arrival to ED and became midpoint after narcan administration. Utox (+) opioids and benzos. OHS reported UE jerking for which he was given 2 mg ativan and 3 keppra prior to transfer.    - Continued admission to [facility]  - Toxicology consulted, appreciate assistance   - Psychiatry consulted  - Q1h neurochecks while in ICU  - Q1h vitals while in ICU  - EEG without seizure activity  - SBP <180  - EKG reviewed  - Daily CMP, Mag, Phos - replete electrolytes PRN  - Daily CBC, transfuse PRN  - Lovenox  - PT/OT/SLP as appropriate  - CM/SW consult for dispo planning

## 2024-02-09 NOTE — PLAN OF CARE
[facility] Care Plan    POC reviewed with Nick Moffett Jr and family at 1400. Pt verbalized understanding. Questions and concerns addressed. No acute events today. Pt progressing toward goals. Will continue to monitor. See below and flowsheets for full assessment and VS info.       -Precedex currently infusing at 0.9; propofol gtt infusing at 25  - LR 1L bolus administered; LR gtt continued at 100ml/hr  - OGT connected to intermittent suction with 250ml output this shift  - Bed bath/ linen change complete  - Urine output inaccurate due to urine leaking around the khan d/t sediment build-up. Khan removed and replaced with condom catheter.       Is this a stroke patient? no    Neuro:  [place] Coma Scale  Best Eye Response: 3-->(E3) to speech  Best Motor Response: 6-->(M6) obeys commands  Best Verbal Response: 1-->(V1) none  [place] Coma Scale Score: 10  Assessment Qualifiers: patient intubated, patient chemically sedated or paralyzed  Pupil PERRLA: yes     24 hr Temp:  [97.6 °F (36.4 °C)-100.8 °F (38.2 °C)]     CV:   Rhythm: normal sinus rhythm  BP goals:   SBP < 180  MAP > 65    Resp:      Vent Mode: A/C  Set Rate: 16 BPM  Oxygen Concentration (%): 40  Vt Set: 450 mL  PEEP/CPAP: 5 cmH20  Pressure Support: 5 cmH20    Plan: N/A    GI/:     Diet/Nutrition Received: NPO  Last Bowel Movement: 02/06/24  Voiding Characteristics: urethral catheter (bladder)    Intake/Output Summary (Last 24 hours) at 2/8/2024 1841  Last data filed at 2/8/2024 1839  Gross per 24 hour   Intake 4479.29 ml   Output 2785 ml   Net 1694.29 ml     Unmeasured Output  Stool Occurrence: 0    Labs/Accuchecks:  Recent Labs   Lab 02/08/24  0001 02/08/24  0413   WBC 9.81  --    RBC 4.39*  --    HGB 12.5*  --    HCT 39.3* 33*     --       Recent Labs   Lab 02/08/24  0001      K 4.1   CO2 17*      BUN 18   CREATININE 1.1   ALKPHOS 55   ALT 18   AST 45*   BILITOT 0.7      Recent Labs   Lab 02/06/24  2110   INR 0.9   APTT 27.1      Recent  Labs   Lab 02/07/24  0542 02/07/24  0900 02/07/24  1934   CPK 1072*  --   --    TROPONINI 0.590*   < > 0.815*    < > = values in this interval not displayed.       Electrolytes: N/A - electrolytes WDL  Accuchecks: Q6H    Gtts:   dexmedeTOMIDine (Precedex) infusion (titrating) 0.9 mcg/kg/hr (02/08/24 1805)    lactated ringers 100 mL/hr at 02/08/24 1805    propofoL 30 mcg/kg/min (02/08/24 1805)       LDA/Wounds:    Nurses Note -- 4 Eyes      2/8/2024   6:41 PM      Skin assessed during: Daily Assessment    Is there altered skin present? no   [x] No Altered Skin Integrity Present    [x]Prevention Measures Documented    Attending Nurse:      Second RN/Staff Member:        LEXUS

## 2024-02-09 NOTE — NURSING
RN giving medications in other room when a systolic of 200 was noticed. As RN began walking out of the other room to assess, the low pressure alarm of the ventilator was heard. ETT visualized in pt's right hand by the RN. Propofol and precedex stopped immediately. Pt orally suctioned and nasal cannula applied at 4.5L. MD, RT, and charge nurse at bedside to assess.

## 2024-02-09 NOTE — NURSING
MD called about elevated blood pressure unresponsive to labetalol and hydralazine. MD to order Nicardipine

## 2024-02-09 NOTE — PLAN OF CARE
Problem: Adult Inpatient Plan of Care  Goal: Plan of Care Review  Outcome: Ongoing, Progressing  Goal: Patient-Specific Goal (Individualized)  Outcome: Ongoing, Progressing  Goal: Absence of Hospital-Acquired Illness or Injury  Outcome: Ongoing, Progressing  Goal: Optimal Comfort and Wellbeing  Outcome: Ongoing, Progressing  Goal: Readiness for Transition of Care  Outcome: Ongoing, Progressing     Problem: Fluid and Electrolyte Imbalance (Acute Kidney Injury/Impairment)  Goal: Fluid and Electrolyte Balance  Outcome: Ongoing, Progressing     Problem: Oral Intake Inadequate (Acute Kidney Injury/Impairment)  Goal: Optimal Nutrition Intake  Outcome: Ongoing, Progressing     Problem: Renal Function Impairment (Acute Kidney Injury/Impairment)  Goal: Effective Renal Function  Outcome: Ongoing, Progressing     Problem: Infection  Goal: Absence of Infection Signs and Symptoms  Outcome: Ongoing, Progressing     Problem: Fall Injury Risk  Goal: Absence of Fall and Fall-Related Injury  Outcome: Ongoing, Progressing     Problem: Restraint, Nonbehavioral (Nonviolent)  Goal: Absence of Harm or Injury  Outcome: Ongoing, Progressing     Problem: Skin Injury Risk Increased  Goal: Skin Health and Integrity  Outcome: Ongoing, Progressing

## 2024-02-10 PROCEDURE — 25000003 PHARM REV CODE 250: Performed by: PSYCHIATRY & NEUROLOGY

## 2024-02-10 PROCEDURE — 25000003 PHARM REV CODE 250: Performed by: REGISTERED NURSE

## 2024-02-10 PROCEDURE — 25000003 PHARM REV CODE 250

## 2024-02-10 PROCEDURE — 99232 SBSQ HOSP IP/OBS MODERATE 35: CPT | Mod: AF,HB,, | Performed by: PSYCHIATRY & NEUROLOGY

## 2024-02-10 PROCEDURE — 99900035 HC TECH TIME PER 15 MIN (STAT)

## 2024-02-10 PROCEDURE — 20000000 HC ICU ROOM

## 2024-02-10 PROCEDURE — 94761 N-INVAS EAR/PLS OXIMETRY MLT: CPT

## 2024-02-10 PROCEDURE — 27000221 HC OXYGEN, UP TO 24 HOURS

## 2024-02-10 RX ORDER — CLONIDINE HYDROCHLORIDE 0.1 MG/1
0.2 TABLET ORAL DAILY
Status: DISCONTINUED | OUTPATIENT
Start: 2024-02-11 | End: 2024-02-13 | Stop reason: HOSPADM

## 2024-02-10 RX ORDER — CLONIDINE HYDROCHLORIDE 0.1 MG/1
0.1 TABLET ORAL ONCE
Status: COMPLETED | OUTPATIENT
Start: 2024-02-10 | End: 2024-02-10

## 2024-02-10 RX ADMIN — CLONIDINE HYDROCHLORIDE 0.1 MG: 0.1 TABLET ORAL at 09:02

## 2024-02-10 RX ADMIN — SENNOSIDES AND DOCUSATE SODIUM 1 TABLET: 8.6; 5 TABLET ORAL at 09:02

## 2024-02-10 RX ADMIN — CLONIDINE HYDROCHLORIDE 0.1 MG: 0.1 TABLET ORAL at 12:02

## 2024-02-10 RX ADMIN — DEXMEDETOMIDINE HYDROCHLORIDE 0.6 MCG/KG/HR: 4 INJECTION INTRAVENOUS at 10:02

## 2024-02-10 RX ADMIN — MELATONIN TAB 3 MG 6 MG: 3 TAB at 09:02

## 2024-02-10 NOTE — PLAN OF CARE
[facility] Care Plan    POC reviewed with Nick Moffett Jr and family at 0300. Pt verbalized understanding. Questions and concerns addressed. No acute events overnight. Pt progressing toward goals. Will continue to monitor. See below and flowsheets for full assessment and VS info.           Is this a stroke patient? no    Neuro:  Ember Coma Scale  Best Eye Response: 4-->(E4) spontaneous  Best Motor Response: 6-->(M6) obeys commands  Best Verbal Response: 4-->(V4) confused  [place] Coma Scale Score: 14  Assessment Qualifiers: patient not sedated/intubated  Pupil PERRLA: yes     24hr Temp:  [97.8 °F (36.6 °C)-101.1 °F (38.4 °C)]     CV:   Rhythm: normal sinus rhythm  BP goals:   SBP < 180  MAP > 65    Resp:      Vent Mode: A/C  Set Rate: 16 BPM  Oxygen Concentration (%): 36  Vt Set: 450 mL  PEEP/CPAP: 5 cmH20  Pressure Support: 5 cmH20    Plan: N/A    GI/:     Diet/Nutrition Received: regular  Last Bowel Movement: 02/09/24  Voiding Characteristics: external catheter    Intake/Output Summary (Last 24 hours) at 2/10/2024 0523  Last data filed at 2/10/2024 0305  Gross per 24 hour   Intake 2100.74 ml   Output 1200 ml   Net 900.74 ml     Unmeasured Output  Urine Occurrence: 1  Stool Occurrence: 0  Pad Count: 3    Labs/Accuchecks:  Recent Labs   Lab 02/09/24 0100   WBC 8.97   RBC 4.27*   HGB 12.2*   HCT 37.7*         Recent Labs   Lab 02/09/24  0100      K 3.9   CO2 24   *   BUN 13   CREATININE 0.9   ALKPHOS 50*   ALT 15   AST 36   BILITOT 0.4      Recent Labs   Lab 02/06/24 2110   INR 0.9   APTT 27.1      Recent Labs   Lab 02/07/24  0542 02/07/24  0900 02/07/24  1934   CPK 1072*  --   --    TROPONINI 0.590*   < > 0.815*    < > = values in this interval not displayed.       Electrolytes: N/A - electrolytes WDL  Accuchecks: none    Gtts:   dexmedeTOMIDine (Precedex) infusion (titrating) 1.2 mcg/kg/hr (02/09/24 2115)    nicardipine Stopped (02/09/24 2371)       LDA/Wounds:    Nurses Note -- 4  Eyes      2/10/2024   5:23 AM      Skin assessed during: Daily Assessment    Is there altered skin present? no   [x] No Altered Skin Integrity Present    [x]Prevention Measures Documented    Attending Nurse:      Second RN/Staff Member:  MCKAYLA Nick    [facility]

## 2024-02-10 NOTE — PROGRESS NOTES
"Chief Complaint: Acute encephalopathy     History of Present Illness: Mr. Nick Moffett Jr. Is 43-year-old male with a PMHx of HTN and schizophrenia who presents to [facility] after being found down on the porch by family members with unknown down time. Upon EMS arrival, agonal respirations with frothy sputum and pinpoint pupils were noted. Narcan was administered and pupils reportedly midpoint but remained non-reactive. .  Agonal respirations frothy sputum noted at the mouth. He also appeared to be "clenching up," concerning for fever activity. He was brought to [facility] where he was intubated for airway protection after he was noted to be severely hypercarbic in the low 70s and loaded with 2 mg ativan and 3 g keppra. Utox (+) for benzos and opioids.  Tmax at [facility] at 100.8 with WBC 20. He was administered a one time dose of both vancomycin and cefepime but does not appear to have been cultured prior to transfer.   He was transferred to [facility] at [facility]  for hourly neuro-monitoring and a higher level of care.       Hospital Course: 02/08/2024 EEG with diffuse disorganized low-amplitude slowing, no seizure activity. Weaning propofol and precedex. SBT failed due to acute desaturation, likely due to tachypnea, continue ventilator wean as tolerated.   02/09/2024 Self extubation in AM, O2 saturation stable and without evidence of respiratory distress. Able to vocalize and with strong cough. Post extubation, patient agitated, accusing medical team and family members of lying and attempting to admit him to a psychiatric facility. Precedex started. Psych consulted for medication management in setting of presumed NMS.     2/10/2024: No acute events overnight. Continues on precedex infusion. Wean as tolerated. Psychiatry following.    Vitals:    02/10/24 0905   BP: (!) 154/85   Pulse: 81   Resp: (!) 23   Temp:      Physical Exam  Vitals and nursing note reviewed.   Constitutional:       General: He is in acute distress.      Interventions: " He is restrained.   HENT:      Head: Normocephalic and atraumatic.   Eyes:      Extraocular Movements: Extraocular movements intact.      Pupils: Pupils are equal, round, and reactive to light.   Cardiovascular:      Rate and Rhythm: Regular rhythm. Tachycardia present.   Pulmonary:      Effort: Pulmonary effort is normal.      Breath sounds: No stridor.      Comments: Intubated and mechanically ventilated   Abdominal:      Comments: Distended    Musculoskeletal:         General: Normal range of motion.      Cervical back: Full passive range of motion without pain.   Skin:     General: Skin is dry.   Neurological:      Mental Status: He is alert.      Comments:   [id number]  Alert, calm  PERRL  Face grossly symmetric  Moves all extremities to command and with spontaneous purposeful movement    Psychiatric:         Mood and Affect: Affect is angry.         Behavior: Behavior is agitated.         Thought Content: Thought content is paranoid.         Unable to test orientation, language, memory, judgment, insight, fund of knowledge, due to clinical status.      A/P:    Neuro  * Acute encephalopathy  43M found down after an unknown amount of time. Pupils fixed and pinpoint upon arrival to ED and became midpoint after narcan administration. Utox (+) opioids and benzos. OHS reported UE jerking for which he was given 2 mg ativan and 3 keppra prior to transfer.     - Continued admission to [facility]  - Toxicology consulted, appreciate assistance   - Psychiatry consulted  - Q1h neurochecks while in ICU  - Q1h vitals while in ICU  - EEG without seizure activity  - SBP <180  - EKG reviewed  - Daily CMP, Mag, Phos - replete electrolytes PRN  - Daily CBC, transfuse PRN  - Lovenox  - PT/OT/SLP as appropriate  - CM/SW consult for dispo planning     Psychiatric  Schizophrenia  Hx previous hospitalizations for exacerbations characterized by grandiose thinking, audiovisual hallucinations, +/- violent ideation     Per mother receives Invega  Sustena injections q28 days d/t adherence issue with daily meds  Per chart review receives Invega Sustena      (Prior medication regimen: Geodon 60 daily, Zyprexa 50 daily, Seroquel 50 daily)     Psychiatry consulted  Precedex     Pulmonary  On mechanically assisted ventilation  See Acute respiratory failure with hypercapnia      Acute respiratory failure with hypercapnia  Extubated 2/9     Cardiac/Vascular  Elevated troponin  Mildly elevated troponin at [facility]  EKG just with nonspecific ST and T wave abnormality, suspect demand ischemia     LVH (left ventricular hypertrophy) due to hypertensive disease, without heart failure  Noted on EKG  Echo with LV concentric remodeling, mildly depressed EF (55-60%)     Hypertension  SBP in 200s at [facility], SBP < 180  PRN labetalol, hydralazine     Renal/  Respiratory acidosis with metabolic acidosis  See Acute respiratory failure with hypercapnia      FREDERICK (acute kidney injury)  BUN 17, Cr 1.62 on admission  Hourly I/O  IVF  Daily CMP  Avoid nephrotoxic medications and renally dose as appropriate  Estimated Creatinine Clearance: 131.9 mL/min (based on SCr of 0.9 mg/dL).     GI  Abdominal rigidity  NG to low intermittant suction  CT ABD/Pelvis without acute change      Hepatitis C antibody test positive  Noted Hep C Ab (+) 10/06/2023     Other  Elevated lactic acid level  Lactic acid level 11 at [facility], repeat 2.6, trending  Blood cx, sputum cx pending, broad spec abx  UA not concerning for UTI        Tobacco abuse  Per chart review  Cessation counseling as appropriate            The patient is being Prophylaxed for:  Venous Thromboembolism with: Chemical  Stress Ulcer with: Not Applicable   Ventilator Pneumonia with: not applicable     Time spent: 37 mins  Jimenez Sood MD

## 2024-02-10 NOTE — PLAN OF CARE
[facility] Care Plan    POC reviewed with Nick Moffett Jr and family at 1400. Pt verbalized understanding. Questions and concerns addressed. No acute events today. Pt progressing toward goals. Will continue to monitor. See below and flowsheets for full assessment and VS info.     -Pt self extubated   -Episode of violent agitation x1. Ativan and haldol given. Precedex started  -Psych consult added  -Nicardipine added for blood pressure control  -Propofol stopped   -LR infusion stopped        Is this a stroke patient? no    Neuro:  Ember Coma Scale  Best Eye Response: 4-->(E4) spontaneous  Best Motor Response: 6-->(M6) obeys commands  Best Verbal Response: 1-->(V1) none  Ember Coma Scale Score: 11  Assessment Qualifiers: patient intubated, patient chemically sedated or paralyzed  Pupil PERRLA: yes     24 hr Temp:  [97 °F (36.1 °C)-98 °F (36.7 °C)]     CV:   Rhythm: normal sinus rhythm  BP goals:   SBP < 180  MAP > 65    Resp:      Vent Mode: A/C  Set Rate: 16 BPM  Oxygen Concentration (%): 36  Vt Set: 450 mL  PEEP/CPAP: 5 cmH20  Pressure Support: 5 cmH20    Plan: N/A    GI/:     Diet/Nutrition Received: NPO  Last Bowel Movement: 02/06/24  Voiding Characteristics: external catheter    Intake/Output Summary (Last 24 hours) at 2/9/2024 1823  Last data filed at 2/9/2024 1625  Gross per 24 hour   Intake 3360.52 ml   Output 1550 ml   Net 1810.52 ml     Unmeasured Output  Urine Occurrence: 1  Stool Occurrence: 0  Pad Count: 2    Labs/Accuchecks:  Recent Labs   Lab 02/09/24  0100   WBC 8.97   RBC 4.27*   HGB 12.2*   HCT 37.7*         Recent Labs   Lab 02/09/24  0100      K 3.9   CO2 24   *   BUN 13   CREATININE 0.9   ALKPHOS 50*   ALT 15   AST 36   BILITOT 0.4      Recent Labs   Lab 02/06/24  2110   INR 0.9   APTT 27.1      Recent Labs   Lab 02/07/24  0542 02/07/24  0900 02/07/24  1934   CPK 1072*  --   --    TROPONINI 0.590*   < > 0.815*    < > = values in this interval not displayed.        Electrolytes: N/A - electrolytes WDL  Accuchecks: none    Gtts:   dexmedeTOMIDine (Precedex) infusion (titrating) 1.2 mcg/kg/hr (02/09/24 1519)    nicardipine Stopped (02/09/24 1745)       LDA/Wounds:    Nurses Note -- 4 Eyes      2/9/2024   6:23 PM      Skin assessed during: Q Shift Change    Is there altered skin present? no   [x] No Altered Skin Integrity Present    [x]Prevention Measures Documented    Attending Nurse:      Second RN/Staff Member:        LEXUS

## 2024-02-10 NOTE — PROGRESS NOTES
"CONSULTATION LIAISON PSYCHIATRY PROGRESS NOTE    Patient Name: Nick Moffett Jr  MRN: [id number]  Patient Class: IP- Inpatient  Admission Date: 2/7/2024  Attending Physician: Jimenez Sood MD      SUBJECTIVE:   Mr. Nick Moffett Jr. Is 43-year-old male with a PMHx of HTN and schizophrenia who presents to [facility] after being found down on the porch by family members with unknown down time.Of note, patient receives invega sustenna IM injection and received his most recent dose on 01/25.      Psychiatry consulted for hx of schizophrenia and possible NMS      No behavioral PRNs overnight, currently on Precedex  Today, patient sleeping comfortably in bed with 2-point locking restraints in place. Family at bedside. Patient mostly asleep during assessment and when awake, would ask about discharge, mildly irritable during brief interview as very discharged focused. Family at bedside to provide redirection.     OBJECTIVE:    Mental Status Exam:  General Appearance: appears stated age, well developed and nourished, adequately groomed and appropriately dressed, in no acute distress  Behavior: minimal responses, drowsy  Involuntary Movements and Motor Activity: no abnormal involuntary movements noted  Gait and Station: unable to assess - patient lying down or seated  Speech and Language: slowed, soft, mumbling  Mood: "ERIN"  Affect:  Drowsy on assessment, irritable when awake briefly  Thought Process and Associations: Unable to Assess  Thought Content and Perceptions:: Unable to Assess  Sensorium and Orientation: delirious, waxing and waning  Recent and Remote Memory: impaired  Attention and Concentration: impaired  Fund of Knowledge: Unable to Formally Assess  Insight: impaired  Judgment: impaired    CAM ICU positive? ERIN      ASSESSMENT & RECOMMENDATIONS   Hx of schizophrenia  R/o NMS   R/o opiate vs clonidine overdose   Acute agitation/ delirium      Schizophrenia  Given history of atypical antipsychotic depot and reported " increased tone, consider avoiding antidopaminergic agents if concern for neuroleptic malignant syndrome. And continue supportive care      Possible opiate overdose vs clonidine overdose  initial UDS was sent to lab at 2116 on 2/6 prior the patient's first dose of fentanyl recorded at 2120 on 2/6. This +opioid screen likely reflects an exposure, however, the positive fentanyl screen from 2/7 may be from the fentanyl patient received during his hospitalization.   Acute agitation/ delirium   PRN: recommend stopping haldol IV 2/2 concern for NMS and use depacon 250 mg IV q6 for non-redirectable agitation      DELIRIUM  DELIRIUM BEHAVIOR MANAGEMENT  PLEASE utilize CHEMICAL restraints with PRN meds first for agitation. Minimize use of PHYSICAL restraints OR have periods of being out of physical restraints if possible.  Keep window shades open and room lit during day and room dim at night in order to promote normal sleep-wake cycles  Encourage family at bedside. [place] patient often to situation, location, date.  Continue to Limit or Discontinue use of Narcotics, Benzos and Anti-cholinergic medications as they may worsen delirium.  Continue medical workup for causative etiology of Delirium.      OTHER PERTINENT DIAGNOSIS     RISK ASSESSMENT  Uncontested   No need for PEC at this time but would recommend patient have 1:1 sitter or camera monitor sitter. If patient were to attempt to leave AMA once he is weaned off of sedation would then recommend patient be PEC'd     FOLLOW UP  Will follow up while in house     DISPOSITION - once medically cleared:   Will continue to assess while in the hospital before a final decision is made.    Please contact ON CALL psychiatry service (24/7) for any acute issues that may arise.    Dr. Rigoberto VAZQUEZ Psychiatry  [facility]  2/10/2024 12:38  PM    --------------------------------------------------------------------------------------------------------------------------------------------------------------------------------------------------------------------------------------    CONTINUED OBJECTIVE clinical data & findings reviewed and noted for above decision making    Current Medications:   Scheduled Meds:    [START ON 2/11/2024] cloNIDine  0.2 mg Oral Daily    enoxparin  40 mg Subcutaneous Q24H (prophylaxis, 1700)    melatonin  6 mg Oral Nightly    senna-docusate 8.6-50 mg  1 tablet Per OG tube Daily     PRN Meds: acetaminophen, dextrose 10%, dextrose 10%, hydrALAZINE, labetalol, lorazepam, magnesium oxide, magnesium oxide, ondansetron, potassium bicarbonate, potassium bicarbonate, potassium bicarbonate, potassium, sodium phosphates, potassium, sodium phosphates, potassium, sodium phosphates, valproate sodium (DEPACON) IVPB    Allergies:   Review of patient's allergies indicates:  No Known Allergies    Vitals  Vitals:    02/10/24 0905   BP: (!) 154/85   Pulse: 81   Resp: (!) 23   Temp:        Labs/Imaging/Studies:  No results found for this or any previous visit (from the past 24 hour(s)).

## 2024-02-11 PROBLEM — Z99.11 ON MECHANICALLY ASSISTED VENTILATION: Status: RESOLVED | Noted: 2024-02-06 | Resolved: 2024-02-11

## 2024-02-11 PROBLEM — N17.9 AKI (ACUTE KIDNEY INJURY): Status: RESOLVED | Noted: 2024-02-06 | Resolved: 2024-02-11

## 2024-02-11 LAB
BASOPHILS # BLD AUTO: 0.04 K/UL (ref 0–0.2)
BASOPHILS NFR BLD: 0.6 % (ref 0–1.9)
DIFFERENTIAL METHOD BLD: ABNORMAL
EOSINOPHIL # BLD AUTO: 0.6 K/UL (ref 0–0.5)
EOSINOPHIL NFR BLD: 9.3 % (ref 0–8)
ERYTHROCYTE [DISTWIDTH] IN BLOOD BY AUTOMATED COUNT: 12 % (ref 11.5–14.5)
HCT VFR BLD AUTO: 36.6 % (ref 40–54)
HGB BLD-MCNC: 12.4 G/DL (ref 14–18)
IMM GRANULOCYTES # BLD AUTO: 0.02 K/UL (ref 0–0.04)
IMM GRANULOCYTES NFR BLD AUTO: 0.3 % (ref 0–0.5)
LYMPHOCYTES # BLD AUTO: 0.9 K/UL (ref 1–4.8)
LYMPHOCYTES NFR BLD: 14.1 % (ref 18–48)
MCH RBC QN AUTO: 29.4 PG (ref 27–31)
MCHC RBC AUTO-ENTMCNC: 33.9 G/DL (ref 32–36)
MCV RBC AUTO: 87 FL (ref 82–98)
MONOCYTES # BLD AUTO: 0.8 K/UL (ref 0.3–1)
MONOCYTES NFR BLD: 12.5 % (ref 4–15)
NEUTROPHILS # BLD AUTO: 3.9 K/UL (ref 1.8–7.7)
NEUTROPHILS NFR BLD: 63.2 % (ref 38–73)
NRBC BLD-RTO: 0 /100 WBC
PLATELET # BLD AUTO: 281 K/UL (ref 150–450)
PMV BLD AUTO: 10 FL (ref 9.2–12.9)
RBC # BLD AUTO: 4.22 M/UL (ref 4.6–6.2)
WBC # BLD AUTO: 6.24 K/UL (ref 3.9–12.7)

## 2024-02-11 PROCEDURE — 25000003 PHARM REV CODE 250: Performed by: PSYCHIATRY & NEUROLOGY

## 2024-02-11 PROCEDURE — 94761 N-INVAS EAR/PLS OXIMETRY MLT: CPT

## 2024-02-11 PROCEDURE — 99233 SBSQ HOSP IP/OBS HIGH 50: CPT | Mod: ,,, | Performed by: PSYCHIATRY & NEUROLOGY

## 2024-02-11 PROCEDURE — 20600001 HC STEP DOWN PRIVATE ROOM

## 2024-02-11 PROCEDURE — 25000003 PHARM REV CODE 250: Performed by: REGISTERED NURSE

## 2024-02-11 PROCEDURE — 85025 COMPLETE CBC W/AUTO DIFF WBC: CPT | Performed by: REGISTERED NURSE

## 2024-02-11 PROCEDURE — 25000003 PHARM REV CODE 250

## 2024-02-11 PROCEDURE — 63600175 PHARM REV CODE 636 W HCPCS: Performed by: STUDENT IN AN ORGANIZED HEALTH CARE EDUCATION/TRAINING PROGRAM

## 2024-02-11 PROCEDURE — 99232 SBSQ HOSP IP/OBS MODERATE 35: CPT | Mod: AF,HB,, | Performed by: PSYCHIATRY & NEUROLOGY

## 2024-02-11 RX ADMIN — MELATONIN TAB 3 MG 6 MG: 3 TAB at 08:02

## 2024-02-11 RX ADMIN — ACETAMINOPHEN 650 MG: 325 TABLET ORAL at 06:02

## 2024-02-11 RX ADMIN — CLONIDINE HYDROCHLORIDE 0.2 MG: 0.2 TABLET ORAL at 09:02

## 2024-02-11 RX ADMIN — ENOXAPARIN SODIUM 40 MG: 40 INJECTION SUBCUTANEOUS at 05:02

## 2024-02-11 RX ADMIN — ACETAMINOPHEN 650 MG: 325 TABLET ORAL at 05:02

## 2024-02-11 RX ADMIN — SENNOSIDES AND DOCUSATE SODIUM 1 TABLET: 8.6; 5 TABLET ORAL at 09:02

## 2024-02-11 NOTE — ASSESSMENT & PLAN NOTE
43M found down after an unknown amount of time. Pupils fixed and pinpoint upon arrival to ED and became midpoint after narcan administration. Utox (+) opioids and benzos. OHS reported UE jerking for which he was given 2 mg ativan and 3 keppra prior to transfer.    - Stepdown to   - Toxicology consulted   - Psychiatry consulted  - Q1h neurochecks while in ICU  - Q1h vitals while in ICU  - EEG without seizure activity  - SBP <180  - EKG reviewed  - Daily CMP, Mag, Phos - replete electrolytes PRN  - Daily CBC, transfuse PRN  - Lovenox  - PT/OT/SLP as appropriate  - CM/SW consult for dispo planning

## 2024-02-11 NOTE — SUBJECTIVE & OBJECTIVE
Interval History:  See hospital course.  Review of Systems   Constitutional:  Negative for fever.   Eyes:  Negative for visual disturbance.   Respiratory:  Negative for cough and shortness of breath.    Cardiovascular:  Negative for chest pain.   Gastrointestinal:  Negative for abdominal pain, nausea and vomiting.   Genitourinary:  Negative for dysuria.   Neurological:  Negative for weakness.   Psychiatric/Behavioral:  The patient is nervous/anxious.      Unable to obtain a complete ROS due to level of consciousness.  Objective:     Vitals:  Temp: 98.5 °F (36.9 °C)  Pulse: 98  Rhythm: normal sinus rhythm  BP: (!) 154/67  MAP (mmHg): 96  Resp: (!) 21  SpO2: 99 %    Temp  Min: 98.1 °F (36.7 °C)  Max: 98.5 °F (36.9 °C)  Pulse  Min: 69  Max: 98  BP  Min: 124/60  Max: 173/88  MAP (mmHg)  Min: 86  Max: 125  Resp  Min: 14  Max: 28  SpO2  Min: 92 %  Max: 99 %    02/10 0701 - 02/11 0700  In: 1820.8 [P.O.:1320; I.V.:500.8]  Out: 5000 [Urine:5000]   Unmeasured Output  Urine Occurrence: 1  Stool Occurrence: 0  Pad Count: 3        Physical Exam  Vitals and nursing note reviewed.   Constitutional:       General: He is awake. He is not in acute distress.     Appearance: Normal appearance. He is not ill-appearing or toxic-appearing.   HENT:      Head: Normocephalic and atraumatic.      Mouth/Throat:      Mouth: Mucous membranes are moist.      Pharynx: Oropharynx is clear.   Cardiovascular:      Rate and Rhythm: Normal rate and regular rhythm.      Pulses: Normal pulses.           Radial pulses are 2+ on the right side and 2+ on the left side.      Heart sounds: No murmur heard.     No gallop.   Pulmonary:      Effort: Pulmonary effort is normal. No respiratory distress.      Breath sounds: No stridor. No wheezing or rales.   Chest:      Chest wall: No tenderness.   Abdominal:      General: Abdomen is flat. There is no distension.      Tenderness: There is no abdominal tenderness.   Musculoskeletal:      Right lower leg: No edema.       Left lower leg: No edema.   Skin:     General: Skin is warm and dry.      Capillary Refill: Capillary refill takes less than 2 seconds.   Neurological:      General: No focal deficit present.      Mental Status: He is alert and oriented to person, place, and time. Mental status is at baseline.      Cranial Nerves: No cranial nerve deficit.      Sensory: No sensory deficit.      Motor: No weakness.   Psychiatric:         Attention and Perception: Attention normal.         Mood and Affect: Mood is anxious.         Speech: Speech normal.         Behavior: Behavior is cooperative.         Thought Content: Thought content is paranoid and delusional. Thought content does not include homicidal or suicidal ideation.              Medications:  Continuous ScheduledcloNIDine, 0.2 mg, Daily  enoxparin, 40 mg, Q24H (prophylaxis, 1700)  melatonin, 6 mg, Nightly  senna-docusate 8.6-50 mg, 1 tablet, Daily    PRNacetaminophen, 650 mg, Q6H PRN  dextrose 10%, 12.5 g, PRN  dextrose 10%, 25 g, PRN  hydrALAZINE, 10 mg, Q6H PRN  labetalol, 10 mg, Q4H PRN  lorazepam, 2 mg, Q15 Min PRN  magnesium oxide, 800 mg, PRN  magnesium oxide, 800 mg, PRN  ondansetron, 8 mg, Q8H PRN  potassium bicarbonate, 35 mEq, PRN  potassium bicarbonate, 50 mEq, PRN  potassium bicarbonate, 60 mEq, PRN  potassium, sodium phosphates, 2 packet, PRN  potassium, sodium phosphates, 2 packet, PRN  potassium, sodium phosphates, 2 packet, PRN  valproate sodium (DEPACON) IVPB, 250 mg, Q6H PRN      Today I personally reviewed pertinent medications, lines/drains/airways, imaging, laboratory results, notably:    Diet  Diet Adult Regular (IDDSI Level 7) Isolation Tray - Styrofoam  Diet Adult Regular (IDDSI Level 7) Isolation Tray - Styrofoam

## 2024-02-11 NOTE — PLAN OF CARE
Problem: Adult Inpatient Plan of Care  Goal: Optimal Comfort and Wellbeing  Outcome: Ongoing, Progressing     Problem: Oral Intake Inadequate (Acute Kidney Injury/Impairment)  Goal: Optimal Nutrition Intake  Outcome: Ongoing, Progressing   [facility] Care Plan    POC reviewed with Nick Moffett Jr and family at 0300. Pt verbalized understanding. Questions and concerns addressed. No acute events overnight. Pt progressing toward goals. Will continue to monitor. See below and flowsheets for full assessment and VS info.           Is this a stroke patient? no    Neuro:  [place] Coma Scale  Best Eye Response: 4-->(E4) spontaneous  Best Motor Response: 6-->(M6) obeys commands  Best Verbal Response: 4-->(V4) confused  [place] Coma Scale Score: 14  Assessment Qualifiers: patient not sedated/intubated  Pupil PERRLA: yes     24hr Temp:  [98.1 °F (36.7 °C)-98.5 °F (36.9 °C)]     CV:   Rhythm: normal sinus rhythm  BP goals:   SBP < 180  MAP > 65    Resp:      Vent Mode: A/C  Set Rate: 16 BPM  Oxygen Concentration (%): 36  Vt Set: 450 mL  PEEP/CPAP: 5 cmH20  Pressure Support: 5 cmH20    Plan: N/A    GI/:     Diet/Nutrition Received: regular  Last Bowel Movement: 02/09/24  Voiding Characteristics: external catheter    Intake/Output Summary (Last 24 hours) at 2/11/2024 0608  Last data filed at 2/10/2024 1805  Gross per 24 hour   Intake 1997.61 ml   Output 2600 ml   Net -602.39 ml     Unmeasured Output  Urine Occurrence: 1  Stool Occurrence: 0  Pad Count: 3    Labs/Accuchecks:  Recent Labs   Lab 02/11/24  0450   WBC 6.24   RBC 4.22*   HGB 12.4*   HCT 36.6*         Recent Labs   Lab 02/09/24  0100      K 3.9   CO2 24   *   BUN 13   CREATININE 0.9   ALKPHOS 50*   ALT 15   AST 36   BILITOT 0.4      Recent Labs   Lab 02/06/24  2110   INR 0.9   APTT 27.1      Recent Labs   Lab 02/07/24  0542 02/07/24  0900 02/07/24  1934   CPK 1072*  --   --    TROPONINI 0.590*   < > 0.815*    < > = values in this interval not  displayed.       Electrolytes: N/A - electrolytes WDL  Accuchecks: none    Gtts:   dexmedeTOMIDine (Precedex) infusion (titrating) 0.6 mcg/kg/hr (02/10/24 2208)    nicardipine Stopped (02/09/24 1741)       LDA/Wounds:    Nurses Note -- 4 Eyes      2/11/2024   6:08 AM      Skin assessed during: Daily Assessment    Is there altered skin present? no   [x] No Altered Skin Integrity Present    []Prevention Measures Documented    Attending Nurse:      Second RN/Staff Member:      LEXUS

## 2024-02-11 NOTE — PROGRESS NOTES
"CONSULTATION LIAISON PSYCHIATRY PROGRESS NOTE    Patient Name: Nick Moffett Jr  MRN: [id number]  Patient Class: IP- Inpatient  Admission Date: 2/7/2024  Attending Physician: Palomo Bajwa MD      SUBJECTIVE:   Mr. Nick Moffett Jr. Is 43-year-old male with a PMHx of HTN and schizophrenia who presents to [facility] after being found down on the porch by family members with unknown down time.Of note, patient receives invega sustenna IM injection and received his most recent dose on 01/25.      Psychiatry consulted for hx of schizophrenia and possible NMS      Patient compliant with scheduled medication, required no involuntarty PRNs overnight. Prior to interview patient resting in bed. During interview patient responds minimally with flat affect. Family at bedside to provide redirection. Denies acute complaints or concerns.    OBJECTIVE:    Mental Status Exam:  General Appearance: appears stated age, well developed and nourished, adequately groomed and appropriately dressed, in no acute distress  Behavior: minimal responses, drowsy  Involuntary Movements and Motor Activity: no abnormal involuntary movements noted  Gait and Station: unable to assess - patient lying down or seated  Speech and Language: slowed, soft, mumbling  Mood: "Ok"  Affect:  Drowsy on assessment, irritable when awake briefly  Thought Process and Associations: Unable to Assess  Thought Content and Perceptions:: Unable to Assess  Sensorium and Orientation: delirious, waxing and waning  Recent and Remote Memory: impaired  Attention and Concentration: impaired  Fund of Knowledge: grossly intact, Unable to Formally Assess  Insight: impaired  Judgment: impaired    CAM ICU positive? No      ASSESSMENT & RECOMMENDATIONS   Hx of schizophrenia  R/o NMS   R/o opiate vs clonidine overdose   Acute agitation/ delirium      Schizophrenia  Given history of atypical antipsychotic depot and reported increased tone, consider avoiding antidopaminergic agents if " concern for neuroleptic malignant syndrome. And continue supportive care      Possible opiate overdose vs clonidine overdose  initial UDS was sent to lab at 2116 on 2/6 prior the patient's first dose of fentanyl recorded at 2120 on 2/6. This +opioid screen likely reflects an exposure, however, the positive fentanyl screen from 2/7 may be from the fentanyl patient received during his hospitalization.   Acute agitation/ delirium   PRN: recommend stopping haldol IV 2/2 concern for NMS and use depacon 250 mg IV q6 for non-redirectable agitation      DELIRIUM  DELIRIUM BEHAVIOR MANAGEMENT  PLEASE utilize CHEMICAL restraints with PRN meds first for agitation. Minimize use of PHYSICAL restraints OR have periods of being out of physical restraints if possible.  Keep window shades open and room lit during day and room dim at night in order to promote normal sleep-wake cycles  Encourage family at bedside. Dairy patient often to situation, location, date.  Continue to Limit or Discontinue use of Narcotics, Benzos and Anti-cholinergic medications as they may worsen delirium.  Continue medical workup for causative etiology of Delirium.      OTHER PERTINENT DIAGNOSIS     RISK ASSESSMENT  Uncontested   No need for PEC at this time but would recommend patient have 1:1 sitter or camera monitor sitter. If patient were to attempt to leave AMA once he is weaned off of sedation would then recommend patient be PEC'd     FOLLOW UP  Will follow up while in house     DISPOSITION - once medically cleared:   Will continue to assess while in the hospital before a final decision is made.    Please contact ON CALL psychiatry service (24/7) for any acute issues that may arise.    Dr. Chano VAZQUEZ Psychiatry  [facility]  2/11/2024 12:38  PM    --------------------------------------------------------------------------------------------------------------------------------------------------------------------------------------------------------------------------------------    CONTINUED OBJECTIVE clinical data & findings reviewed and noted for above decision making    Current Medications:   Scheduled Meds:    cloNIDine  0.2 mg Oral Daily    enoxparin  40 mg Subcutaneous Q24H (prophylaxis, 1700)    melatonin  6 mg Oral Nightly    senna-docusate 8.6-50 mg  1 tablet Per OG tube Daily     PRN Meds: acetaminophen, dextrose 10%, dextrose 10%, hydrALAZINE, labetalol, lorazepam, magnesium oxide, magnesium oxide, ondansetron, potassium bicarbonate, potassium bicarbonate, potassium bicarbonate, potassium, sodium phosphates, potassium, sodium phosphates, potassium, sodium phosphates, valproate sodium (DEPACON) IVPB    Allergies:   Review of patient's allergies indicates:  No Known Allergies    Vitals  Vitals:    02/11/24 0605   BP: (!) 165/79   Pulse: 79   Resp: (!) 24   Temp:        Labs/Imaging/Studies:  Recent Results (from the past 24 hour(s))   CBC auto differential    Collection Time: 02/11/24  4:50 AM   Result Value Ref Range    WBC 6.24 3.90 - 12.70 K/uL    RBC 4.22 (L) 4.60 - 6.20 M/uL    Hemoglobin 12.4 (L) 14.0 - 18.0 g/dL    Hematocrit 36.6 (L) 40.0 - 54.0 %    MCV 87 82 - 98 fL    MCH 29.4 27.0 - 31.0 pg    MCHC 33.9 32.0 - 36.0 g/dL    RDW 12.0 11.5 - 14.5 %    Platelets 281 150 - 450 K/uL    MPV 10.0 9.2 - 12.9 fL    Immature Granulocytes 0.3 0.0 - 0.5 %    Gran # (ANC) 3.9 1.8 - 7.7 K/uL    Immature Grans (Abs) 0.02 0.00 - 0.04 K/uL    Lymph # 0.9 (L) 1.0 - 4.8 K/uL    Mono # 0.8 0.3 - 1.0 K/uL    Eos # 0.6 (H) 0.0 - 0.5 K/uL    Baso # 0.04 0.00 - 0.20 K/uL    nRBC 0 0 /100 WBC    Gran % 63.2 38.0 - 73.0 %    Lymph % 14.1 (L) 18.0 - 48.0 %    Mono % 12.5 4.0 - 15.0 %    Eosinophil % 9.3 (H) 0.0 - 8.0 %    Basophil % 0.6 0.0 - 1.9 %    Differential  Method Automated

## 2024-02-11 NOTE — PROVIDER TRANSFER
"Neuro Critical Care Transfer of Care note    Date of Admit: 2/7/2024  Date of Transfer / Stepdown: 2/11/2024    Brief History of Present Illness:      Nick Moffett Jr is a 43 y.o. male who  has a past medical history of Hypertension.. The patient presented to [facility] on 2/7/2024 with a primary complaint of acute encephalopathy.      History of Present Illness: Mr. Nick Moffett Jr. Is 43-year-old male with a PMHx of HTN and schizophrenia who presents to [facility] after being found down on the porch by family members with unknown down time. Upon EMS arrival, agonal respirations with frothy sputum and pinpoint pupils were noted. Narcan was administered and pupils reportedly midpoint but remained non-reactive. .  Agonal respirations frothy sputum noted at the mouth. He also appeared to be "clenching up," concerning for fever activity. He was brought to [facility] where he was intubated for airway protection after he was noted to be severely hypercarbic in the low 70s and loaded with 2 mg ativan and 3 g keppra. Utox (+) for benzos and opioids.  Tmax at [facility] at 100.8 with WBC 20. He was administered a one time dose of both vancomycin and cefepime but does not appear to have been cultured prior to transfer.   He was transferred to [facility] at [facility]  for hourly neuro-monitoring and a higher level of care.       Hospital Course: 02/08/2024 EEG with diffuse disorganized low-amplitude slowing, no seizure activity. Weaning propofol and precedex. SBT failed due to acute desaturation, likely due to tachypnea, continue ventilator wean as tolerated.   02/09/2024 Self extubation in AM, O2 saturation stable and without evidence of respiratory distress. Able to vocalize and with strong cough. Post extubation, patient agitated, accusing medical team and family members of lying and attempting to admit him to a psychiatric facility. Precedex started. Psych consulted for medication management in setting of presumed NMS.  2/10/2024: No " acute events overnight. Continues on precedex infusion. Wean as tolerated. Psychiatry following.   2/11/24:No acute events, patient weaned off of propofol overnight.    Hospital Course By Problem with Pertinent Findings:     1. Acute encephalopathy   -Follow up psychiatry recs  -Q4hr neuro checks  - Daily CMP, Mag, Phos - replete electrolytes PRN  - Daily CBC, transfuse PRN    2. Psychiatric  Schizophrenia  Hx previous hospitalizations for exacerbations characterized by grandiose thinking, audiovisual hallucinations, +/- violent ideation     Per mother receives Invega Sustena injections q28 days d/t adherence issue with daily meds  Per chart review receives Invega Sustena      (Prior medication regimen: Geodon 60 daily, Zyprexa 50 daily, Seroquel 50 daily)     Psychiatry consulted      3. Hypertension   Clonidine 0.2 mg daily  PRN labetalol, hydralazine        Consultants and Procedures:     Consultants:  Psychiatry  Medical toxicology    Procedures:    None    Transfer Information:     Diet:  Regular    Physical Activity:As tolerated      To Do / Pending Studies / Follow ups:  Follow up psychiatry recs      ALBERTINA BRAGG  Neuro Crtical Care

## 2024-02-11 NOTE — ASSESSMENT & PLAN NOTE
Hx previous hospitalizations for exacerbations characterized by grandiose thinking, audiovisual hallucinations, +/- violent ideation    Per mother receives Invega Sustena injections q28 days d/t adherence issue with daily meds  Per chart review receives Invega Sustena     (Prior medication regimen: Geodon 60 daily, Zyprexa 50 daily, Seroquel 50 daily)    Psychiatry consulted  Discontinue Precedex

## 2024-02-11 NOTE — PROGRESS NOTES
"[facility] - Neuro Critical Care  Neurocritical Care  Progress Note    Admit Date: 2/7/2024  Service Date: 02/11/2024  Length of Stay: 4    Subjective:     Chief Complaint: Acute encephalopathy    History of Present Illness: Mr. Nick Moffett  Is 43-year-old male with a PMHx of HTN and schizophrenia who presents to [facility] after being found down on the porch by family members with unknown down time. Upon EMS arrival, agonal respirations with frothy sputum and pinpoint pupils were noted. Narcan was administered and pupils reportedly midpoint but remained non-reactive. .  Agonal respirations frothy sputum noted at the mouth. He also appeared to be "clenching up," concerning for fever activity. He was brought to [facility] where he was intubated for airway protection after he was noted to be severely hypercarbic in the low 70s and loaded with 2 mg ativan and 3 g keppra. Utox (+) for benzos and opioids.  Tmax at [facility] at 100.8 with WBC 20. He was administered a one time dose of both vancomycin and cefepime but does not appear to have been cultured prior to transfer.   He was transferred to [facility] at [facility]  for hourly neuro-monitoring and a higher level of care.      Hospital Course: 02/08/2024 EEG with diffuse disorganized low-amplitude slowing, no seizure activity. Weaning propofol and precedex. SBT failed due to acute desaturation, likely due to tachypnea, continue ventilator wean as tolerated.   02/09/2024 Self extubation in AM, O2 saturation stable and without evidence of respiratory distress. Able to vocalize and with strong cough. Post extubation, patient agitated, accusing medical team and family members of lying and attempting to admit him to a psychiatric facility. Precedex started. Psych consulted for medication management in setting of presumed NMS.  2/10/2024: No acute events overnight. Continues on precedex infusion. Wean as tolerated. Psychiatry following.   2/11/24:No acute events, patient weaned off of propofol " overnight.    Interval History:  See hospital course.  Review of Systems   Constitutional:  Negative for fever.   Eyes:  Negative for visual disturbance.   Respiratory:  Negative for cough and shortness of breath.    Cardiovascular:  Negative for chest pain.   Gastrointestinal:  Negative for abdominal pain, nausea and vomiting.   Genitourinary:  Negative for dysuria.   Neurological:  Negative for weakness.   Psychiatric/Behavioral:  The patient is nervous/anxious.      Unable to obtain a complete ROS due to level of consciousness.  Objective:     Vitals:  Temp: 98.5 °F (36.9 °C)  Pulse: 98  Rhythm: normal sinus rhythm  BP: (!) 154/67  MAP (mmHg): 96  Resp: (!) 21  SpO2: 99 %    Temp  Min: 98.1 °F (36.7 °C)  Max: 98.5 °F (36.9 °C)  Pulse  Min: 69  Max: 98  BP  Min: 124/60  Max: 173/88  MAP (mmHg)  Min: 86  Max: 125  Resp  Min: 14  Max: 28  SpO2  Min: 92 %  Max: 99 %    02/10 0701 - 02/11 0700  In: 1820.8 [P.O.:1320; I.V.:500.8]  Out: 5000 [Urine:5000]   Unmeasured Output  Urine Occurrence: 1  Stool Occurrence: 0  Pad Count: 3        Physical Exam  Vitals and nursing note reviewed.   Constitutional:       General: He is awake. He is not in acute distress.     Appearance: Normal appearance. He is not ill-appearing or toxic-appearing.   HENT:      Head: Normocephalic and atraumatic.      Mouth/Throat:      Mouth: Mucous membranes are moist.      Pharynx: Oropharynx is clear.   Cardiovascular:      Rate and Rhythm: Normal rate and regular rhythm.      Pulses: Normal pulses.           Radial pulses are 2+ on the right side and 2+ on the left side.      Heart sounds: No murmur heard.     No gallop.   Pulmonary:      Effort: Pulmonary effort is normal. No respiratory distress.      Breath sounds: No stridor. No wheezing or rales.   Chest:      Chest wall: No tenderness.   Abdominal:      General: Abdomen is flat. There is no distension.      Tenderness: There is no abdominal tenderness.   Musculoskeletal:      Right lower  leg: No edema.      Left lower leg: No edema.   Skin:     General: Skin is warm and dry.      Capillary Refill: Capillary refill takes less than 2 seconds.   Neurological:      General: No focal deficit present.      Mental Status: He is alert and oriented to person, place, and time. Mental status is at baseline.      Cranial Nerves: No cranial nerve deficit.      Sensory: No sensory deficit.      Motor: No weakness.   Psychiatric:         Attention and Perception: Attention normal.         Mood and Affect: Mood is anxious.         Speech: Speech normal.         Behavior: Behavior is cooperative.         Thought Content: Thought content is paranoid and delusional. Thought content does not include homicidal or suicidal ideation.              Medications:  Continuous ScheduledcloNIDine, 0.2 mg, Daily  enoxparin, 40 mg, Q24H (prophylaxis, 1700)  melatonin, 6 mg, Nightly  senna-docusate 8.6-50 mg, 1 tablet, Daily    PRNacetaminophen, 650 mg, Q6H PRN  dextrose 10%, 12.5 g, PRN  dextrose 10%, 25 g, PRN  hydrALAZINE, 10 mg, Q6H PRN  labetalol, 10 mg, Q4H PRN  lorazepam, 2 mg, Q15 Min PRN  magnesium oxide, 800 mg, PRN  magnesium oxide, 800 mg, PRN  ondansetron, 8 mg, Q8H PRN  potassium bicarbonate, 35 mEq, PRN  potassium bicarbonate, 50 mEq, PRN  potassium bicarbonate, 60 mEq, PRN  potassium, sodium phosphates, 2 packet, PRN  potassium, sodium phosphates, 2 packet, PRN  potassium, sodium phosphates, 2 packet, PRN  valproate sodium (DEPACON) IVPB, 250 mg, Q6H PRN      Today I personally reviewed pertinent medications, lines/drains/airways, imaging, laboratory results, notably:    Diet  Diet Adult Regular (IDDSI Level 7) Isolation Tray - Styrofoam  Diet Adult Regular (IDDSI Level 7) Isolation Tray - Styrofoam        Assessment/Plan:     Neuro  * Acute encephalopathy  43M found down after an unknown amount of time. Pupils fixed and pinpoint upon arrival to ED and became midpoint after narcan administration. Utox (+) opioids  and benzos. [facility] reported UE jerking for which he was given 2 mg ativan and 3 keppra prior to transfer.    - Stepdown to   - Toxicology consulted   - Psychiatry consulted  - Q1h neurochecks while in ICU  - Q1h vitals while in ICU  - EEG without seizure activity  - SBP <180  - EKG reviewed  - Daily CMP, Mag, Phos - replete electrolytes PRN  - Daily CBC, transfuse PRN  - Lovenox  - PT/OT/SLP as appropriate  - CM/SW consult for dispo planning    Psychiatric  Schizophrenia  Hx previous hospitalizations for exacerbations characterized by grandiose thinking, audiovisual hallucinations, +/- violent ideation    Per mother receives Invega Sustena injections q28 days d/t adherence issue with daily meds  Per chart review receives Invega Sustena     (Prior medication regimen: Geodon 60 daily, Zyprexa 50 daily, Seroquel 50 daily)    Psychiatry consulted  Discontinue Precedex    Pulmonary  Acute respiratory failure with hypercapnia  Extubated 2/9    On mechanically assisted ventilation-resolved as of 2/11/2024  See Acute respiratory failure with hypercapnia     Cardiac/Vascular  Elevated troponin  Mildly elevated troponin at [facility]  EKG just with nonspecific ST and T wave abnormality, suspect demand ischemia    LVH (left ventricular hypertrophy) due to hypertensive disease, without heart failure  Noted on EKG  Echo with LV concentric remodeling, mildly depressed EF (55-60%)    Hypertension  Clonidine 0.2 mg daily  PRN labetalol, hydralazine    Renal/  Respiratory acidosis with metabolic acidosis  See Acute respiratory failure with hypercapnia     FREDERICK (acute kidney injury)-resolved as of 2/11/2024  BUN 17, Cr 1.62 on admission  Hourly I/O  IVF  Daily CMP  Avoid nephrotoxic medications and renally dose as appropriate  Estimated Creatinine Clearance: 131.9 mL/min (based on SCr of 0.9 mg/dL).    GI  Abdominal rigidity  NG to low intermittant suction  CT ABD/Pelvis without acute change     Hepatitis C antibody test positive  Noted Hep  C Ab (+) 10/06/2023    Other  Elevated lactic acid level  Lactic acid level 11 at [facility], repeat 2.6, trending  Blood cx, sputum cx pending, broad spec abx  UA not concerning for UTI      Tobacco abuse  Per chart review  Cessation counseling as appropriate          The patient is being Prophylaxed for:  Venous Thromboembolism with: Mechanical or Chemical  Stress Ulcer with: None  Ventilator Pneumonia with: not applicable    Activity Orders            Discontinue arterial line starting at 02/10 1144    Diet Adult Regular (IDDSI Level 7) Isolation Tray - Styrofoam: Regular starting at 02/09 1153    Turn patient every 2 hours starting at 02/08 0400    Progressive Mobility Protocol (mobilize patient to their highest level of functioning at least twice daily) starting at 02/07 2000          Full Code    ALBERTINA BRAGG MD  Neurocritical Care  [facility] - Neuro Critical Care

## 2024-02-12 LAB
BACTERIA BLD CULT: NORMAL
BACTERIA BLD CULT: NORMAL

## 2024-02-12 PROCEDURE — 63600175 PHARM REV CODE 636 W HCPCS

## 2024-02-12 PROCEDURE — 25000003 PHARM REV CODE 250: Performed by: PSYCHIATRY & NEUROLOGY

## 2024-02-12 PROCEDURE — 11000001 HC ACUTE MED/SURG PRIVATE ROOM

## 2024-02-12 PROCEDURE — 97165 OT EVAL LOW COMPLEX 30 MIN: CPT

## 2024-02-12 PROCEDURE — 97161 PT EVAL LOW COMPLEX 20 MIN: CPT

## 2024-02-12 PROCEDURE — 99232 SBSQ HOSP IP/OBS MODERATE 35: CPT | Mod: AF,HB,, | Performed by: PSYCHIATRY & NEUROLOGY

## 2024-02-12 PROCEDURE — 25000003 PHARM REV CODE 250

## 2024-02-12 PROCEDURE — 63600175 PHARM REV CODE 636 W HCPCS: Performed by: STUDENT IN AN ORGANIZED HEALTH CARE EDUCATION/TRAINING PROGRAM

## 2024-02-12 PROCEDURE — 97116 GAIT TRAINING THERAPY: CPT

## 2024-02-12 PROCEDURE — 20600001 HC STEP DOWN PRIVATE ROOM

## 2024-02-12 PROCEDURE — 25000003 PHARM REV CODE 250: Performed by: REGISTERED NURSE

## 2024-02-12 PROCEDURE — 97535 SELF CARE MNGMENT TRAINING: CPT

## 2024-02-12 PROCEDURE — 25000003 PHARM REV CODE 250: Performed by: HOSPITALIST

## 2024-02-12 RX ORDER — ZOLPIDEM TARTRATE 5 MG/1
5 TABLET ORAL NIGHTLY PRN
Status: DISCONTINUED | OUTPATIENT
Start: 2024-02-12 | End: 2024-02-13 | Stop reason: HOSPADM

## 2024-02-12 RX ADMIN — CLONIDINE HYDROCHLORIDE 0.2 MG: 0.2 TABLET ORAL at 09:02

## 2024-02-12 RX ADMIN — HYDRALAZINE HYDROCHLORIDE 10 MG: 20 INJECTION, SOLUTION INTRAMUSCULAR; INTRAVENOUS at 05:02

## 2024-02-12 RX ADMIN — ACETAMINOPHEN 650 MG: 325 TABLET ORAL at 09:02

## 2024-02-12 RX ADMIN — MELATONIN TAB 3 MG 6 MG: 3 TAB at 08:02

## 2024-02-12 RX ADMIN — HYDRALAZINE HYDROCHLORIDE 10 MG: 20 INJECTION, SOLUTION INTRAMUSCULAR; INTRAVENOUS at 06:02

## 2024-02-12 RX ADMIN — ACETAMINOPHEN 650 MG: 325 TABLET ORAL at 05:02

## 2024-02-12 RX ADMIN — ENOXAPARIN SODIUM 40 MG: 40 INJECTION SUBCUTANEOUS at 05:02

## 2024-02-12 RX ADMIN — ZOLPIDEM TARTRATE 5 MG: 5 TABLET ORAL at 10:02

## 2024-02-12 RX ADMIN — ACETAMINOPHEN 650 MG: 325 TABLET ORAL at 01:02

## 2024-02-12 NOTE — NURSING TRANSFER
Nursing Transfer Note      2/12/2024   6:29 AM    Nurse giving handoff: MCKAYLA Damon  Nurse receiving handoff:MCKAYLA Patrick    Reason patient is being transferred: stepdown    Transfer From: [facility] [id number]    Transfer via bed    Transfer with cardiac monitoring    Transported by RN/PCT    Transfer Vital Signs:  Blood Pressure:163/93  Heart Rate:100  O2:94  Temperature:99.1  Respirations:21    Telemetry: Box Number [id number]  Order for Tele Monitor? Yes    Additional Lines: NA    4eyes on Skin: yes    Medicines sent: NA    Any special needs or follow-up needed: NA    Patient belongings transferred with patient: Yes    Chart send with patient: Yes    Notified: mother/Preeti at bedside    Patient reassessed at: 2/12/24 @0615 (date, time)  1  Upon arrival to floor: cardiac monitor applied, patient oriented to room, call bell in reach, and bed in lowest position, RN at bedside

## 2024-02-12 NOTE — PLAN OF CARE
Problem: Occupational Therapy  Goal: Occupational Therapy Goal  Description: Goals to be met by: 02-26-24     Patient will increase functional independence with ADLs by performing:    UE Dressing with [place].  LE Dressing with Supervision.  Grooming while standing at sink with Supervision.  Toileting from toilet with Supervision for hygiene and clothing management.   Supine to sit with [place].  Stand pivot transfers with Supervision.  Step transfer with Supervision    Outcome: Ongoing, Progressing

## 2024-02-12 NOTE — MEDICAL/APP STUDENT
"CONSULTATION LIAISON PSYCHIATRY PROGRESS NOTE    Patient Name: Nick Moffett Jr  MRN: [id number]  Patient Class: IP- Inpatient  Admission Date: 2/7/2024  Attending Physician: Lonnie Estevez MD      SUBJECTIVE:   Nick Moffett Jr is a 43 y.o. male with past psychiatric history of *** & past pertinent medical history of *** presents to the ED/admitted to the hospital for Acute encephalopathy    Psychiatry consulted for ***    Past patient review. Zuhair is substantially improved since last week. Pt is able to communicate normally and ambulate with assistance. Pt denies current drug use related to his current hospitalization. Pt stated that he has used opioids recreationally in the past. PT was ambiguous regarding timeline of past recreational opioid use.        OBJECTIVE:    Mental Status Exam:  General Appearance: appears stated age, well developed and nourished, adequately groomed and appropriately dressed, in no acute distress  Behavior: normal, cooperative  Involuntary Movements and Motor Activity: ***  Gait and Station: normal gait and station  Speech and Language: intact; normal rate, rhythm, volume, tone, and pitch; conversational, spontaneous, and coherent; speaks and understands English proficiently and fluently; repeats words and phrases, no word finding difficulties are noted  Mood: "***"  Affect: normal, euthymic, reactive, full-range, mood-congruent, appropriate to situation and context  Thought Process and Associations: intact; linear, goal-directed, organized, and logical; no loosening of associations noted  Thought Content and Perceptions:: no suicidal or homicidal ideation, no auditory or visual hallucinations, no paranoid ideation, no ideas of reference, no evidence of delusions or psychosis  Sensorium and Orientation: intact; alert with clear sensorium; oriented fully to person, place, time and situation  Recent and Remote Memory: grossly intact, able to recall relevant and salient " information from the recent and remote past  Attention and Concentration: grossly intact, attentive to the conversation and not readily distractible  Fund of Knowledge: grossly intact, used appropriate vocabulary and demonstrated an awareness of current events, consistent with educational level achieved  Insight: intact  Judgment: intact, behavior is adequate/appropriate to the circumstances, compliant with health provider's recommendations and instructions    CAM ICU positive? ***      ASSESSMENT & RECOMMENDATIONS   (Please list each relevant SPECIFIC psychiatric DSMV or medical diagnosis and recs for it under the listing DO NOT WRITE AN IMPRESSION)    PSYCH MEDICATIONS  Scheduled  PRN  None needed    RISK ASSESSMENT  NEEDS PEC for SI/HI or grave disability & NEEDS 1:1 sitter  NO NEED FOR PEC vs. UNCONTESTED    FOLLOW UP  Will follow up while in house  Will sign off. Patient can follow up with ***/outpatient mental health provider. Resources provided in patient's discharge instructions.    DISPOSITION - once medically cleared:  Seek involuntary inpatient psychiatric admission for stabilization of acute psychiatric symptoms and a safe disposition plan is enacted. The patient &/or their family was informed that the patient will be transferred to an inpatient unit per ED placement team.   OR  Patient may be discharged home with next of kin with outpatient psychaitric follow up/rehab.   OR  Defer to medical team    Please contact ON CALL psychiatry service (24/7) for any acute issues that may arise.    Victor Manuel Capps MS3   Psychiatry  [facility]  2/12/2024 7:52 AM        --------------------------------------------------------------------------------------------------------------------------------------------------------------------------------------------------------------------------------------    CONTINUED OBJECTIVE clinical data & findings reviewed and noted for above decision  making    Current Medications:   Scheduled Meds:    cloNIDine  0.2 mg Oral Daily    enoxparin  40 mg Subcutaneous Q24H (prophylaxis, 1700)    melatonin  6 mg Oral Nightly    senna-docusate 8.6-50 mg  1 tablet Per OG tube Daily     PRN Meds: acetaminophen, dextrose 10%, dextrose 10%, hydrALAZINE, labetalol, lorazepam, magnesium oxide, magnesium oxide, ondansetron, potassium bicarbonate, potassium bicarbonate, potassium bicarbonate, potassium, sodium phosphates, potassium, sodium phosphates, potassium, sodium phosphates, valproate sodium (DEPACON) IVPB    Allergies:   Review of patient's allergies indicates:  No Known Allergies    Vitals  Vitals:    02/12/24 0631   BP: (!) 173/105   Pulse: 105   Resp: 17   Temp: 100 °F (37.8 °C)       Labs/Imaging/Studies:  No results found for this or any previous visit (from the past 24 hour(s)).

## 2024-02-12 NOTE — PLAN OF CARE
Problem: Adult Inpatient Plan of Care  Goal: Optimal Comfort and Wellbeing  Outcome: Ongoing, Progressing  Goal: Readiness for Transition of Care  Outcome: Ongoing, Progressing   [facility] Care Plan    POC reviewed with Nick Moffett Jr and family at 0300. Pt verbalized understanding. Questions and concerns addressed. No acute events overnight. Pt progressing toward goals. Will continue to monitor. See below and flowsheets for full assessment and VS info.           Is this a stroke patient? no    Neuro:  [place] Coma Scale  Best Eye Response: 4-->(E4) spontaneous  Best Motor Response: 6-->(M6) obeys commands  Best Verbal Response: 4-->(V4) confused  [place] Coma Scale Score: 14  Assessment Qualifiers: patient not sedated/intubated  Pupil PERRLA: yes     24hr Temp:  [98.2 °F (36.8 °C)-99.1 °F (37.3 °C)]     CV:   Rhythm: normal sinus rhythm  BP goals:   SBP < 180  MAP > 65    Resp:      Vent Mode: A/C  Set Rate: 16 BPM  Oxygen Concentration (%): 36  Vt Set: 450 mL  PEEP/CPAP: 5 cmH20  Pressure Support: 5 cmH20    Plan: N/A    GI/:     Diet/Nutrition Received: regular  Last Bowel Movement: 02/09/24  Voiding Characteristics: external catheter    Intake/Output Summary (Last 24 hours) at 2/12/2024 0550  Last data filed at 2/12/2024 0505  Gross per 24 hour   Intake 920.89 ml   Output 7500 ml   Net -6579.11 ml     Unmeasured Output  Urine Occurrence: 1  Stool Occurrence: 0  Pad Count: 3    Labs/Accuchecks:  Recent Labs   Lab 02/11/24  0450   WBC 6.24   RBC 4.22*   HGB 12.4*   HCT 36.6*         Recent Labs   Lab 02/09/24  0100      K 3.9   CO2 24   *   BUN 13   CREATININE 0.9   ALKPHOS 50*   ALT 15   AST 36   BILITOT 0.4      Recent Labs   Lab 02/06/24  2110   INR 0.9   APTT 27.1      Recent Labs   Lab 02/07/24  0542 02/07/24  0900 02/07/24  1934   CPK 1072*  --   --    TROPONINI 0.590*   < > 0.815*    < > = values in this interval not displayed.       Electrolytes: N/A - electrolytes WDL  Accuchecks:  none    Gtts:      LDA/Wounds:    Nurses Note -- 4 Eyes      2/12/2024   5:50 AM      Skin assessed during: Q Shift Change    Is there altered skin present? no   [x] No Altered Skin Integrity Present    []Prevention Measures Documented    Attending Nurse:      Second RN/Staff Member:  MCKAYLA Nick    WC

## 2024-02-12 NOTE — PLAN OF CARE
Problem: Physical Therapy  Goal: Physical Therapy Goal  Description: Patient is at his physical baseline and is appropriate to be discharged from therapy at this time. Once medically stable he is physically appropriate to return home.   Outcome: Ongoing, Progressing

## 2024-02-12 NOTE — NURSING
Nurses Note -- 4 Eyes      2/12/2024   0600      Skin assessed during: Transfer      [] No Altered Skin Integrity Present    []Prevention Measures Documented      [x] Yes- Altered Skin Integrity Present or Discovered   [] LDA Added if Not in Epic (Describe Wound)   [x] New Altered Skin Integrity was Present on Admit and Documented in LDA   [] Wound Image Taken    Wound Care Consulted? No    Attending Nurse:  Celina Thomas RN/Staff Member:   Meaghan     Two blisters noted on right hand, rash noted on left hand. Patient and family member stated occurred at hospital secondary to reaction to medication (right hand) and pt touching medical device (left hand)

## 2024-02-12 NOTE — PT/OT/SLP EVAL
"Physical Therapy Evaluation    Patient Name:  Nick Moffett Jr   MRN:  [id number]    Recommendations:     Discharge Recommendations: No Therapy Indicated   Discharge Equipment Recommendations: none   Barriers to discharge: None    Assessment:     Nick Moffett Jr is a 43 y.o. male admitted with a medical diagnosis of Acute encephalopathy.  He presents with the following impairments/functional limitations: impaired cognition, decreased safety awareness.  Noted short answers from patient throughout session, however was safe to ambulate and hold a conversation without demonstrating any LOB.     Rehab Prognosis: Good; patient would benefit from acute skilled PT services to address these deficits and reach maximum level of function.    Recent Surgery: * No surgery found *      Plan:     During this hospitalization, patient to be seen   to address the identified rehab impairments via   and progress toward the following goals:    Plan of Care Expires:  02/12/24    Subjective     Chief Complaint: "Do you think that I could go home today?"  Patient/Family Comments/goals: To get back to normal function  Pain/Comfort:  Pain Rating 1: 0/10    Patients cultural, spiritual, Buddhist conflicts given the current situation:      Living Environment:  Resides with his mother in a ss house with a threshold step to enter. Pt. Has a tub shower with no seat  Previous level of function: Pt. 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  Equipment used at home: none.  DME owned (not currently used): none.  Upon discharge, patient will have assistance from Mother.    Objective:     Communicated with RN prior to session.  Patient found ambulatory in room/[place] cruz with  (No lines, telemetry had been taken off for a shower)  upon PT entry to room.    General Precautions: Standard, fall, " seizure  Orthopedic Precautions:N/A   Braces: N/A  Respiratory Status: Room air    Exams:  Cognitive Exam:  Patient is oriented to Person, Place, and Time  Sensation:    -       Intact  RLE Strength: WNL  LLE Strength: WNL    Functional Mobility:  Bed Mobility:     Supine to Sit: independence  Sit to Supine: independence  Transfers:     Sit to Stand:  independence with no AD  Gait: 400' no AD normal lena with equal bilateral step length  Balance: Good PUTNAM 56/56      AM-PAC 6 CLICK MOBILITY  Total Score:24       Treatment & Education:  Observed good balance with all tasks. Tolerated bilateral head turns and conversation with ambulation without any LOB noted.   Performed well on PUTNAM balance scoring.   Tolerated ambulation will without any reports of fatigue.     Patient was finishing with shower in the bathroom. Limited by poor safety awareness, responding well to single step verbal commands.   From a physical perspective patient is doing well, although will require supervision for safety with mobility as patient attempted to ambulate into a different hospital room despite redirection from therapist and providing patient with room number, he was unable to scan well enough to identify.     Patient left up in chair with call button in reach.    GOALS:   Multidisciplinary Problems       Physical Therapy Goals          Problem: Physical Therapy    Goal Priority Disciplines Outcome Goal Variances Interventions   Physical Therapy Goal     PT, PT/OT Ongoing, Progressing     Description: Patient is at his physical baseline and is appropriate to be discharged from therapy at this time. Once medically stable he is physically appropriate to return home.                        History:     Past Medical History:   Diagnosis Date    Hypertension        Past Surgical History:   Procedure Laterality Date    APPENDECTOMY      difficult airway         Time Tracking:     PT Received On: 02/12/24  PT Start Time: 1320     PT Stop  Time: 1339  PT Total Time (min): 19 min     Billable Minutes: Evaluation 10 min and Gait Training 9 min      02/12/2024

## 2024-02-12 NOTE — PT/OT/SLP EVAL
Occupational Therapy   Evaluation/tx    Name: Nick Moffett Jr  MRN: [id number]  Admitting Diagnosis: Acute encephalopathy  Recent Surgery: * No surgery found *      Recommendations:     Discharge Recommendations: Low Intensity Therapy  Discharge Equipment Recommendations:  walker, rolling  Barriers to discharge:  None    Assessment:     Nick Moffett Jr is a 43 y.o. male with a medical diagnosis of Acute encephalopathy.  He presents with unsteady gait when ambulating to and from bathroom which improved with the use of RW. Pt. Alert during session but at times slow to respond to questions. Pt. Is a high fall risk at this time. Patient would benefit from continued OT services to maximize level of safety and independence with self-care tasks.    . Performance deficits affecting function: weakness, impaired endurance, impaired self care skills, impaired functional mobility, gait instability, impaired cognition, decreased safety awareness.      Patient demonstrates a mobility limitation that significantly impairs their ability to participate in one or more mobility related activities of daily living. Patient's mobility limitation cannot be sufficiently resolved with the use of a cane, but can be sufficiently resolved with the use of a rolling walker.The use of a rolling walker will considerably improve their ability to participate in MRADLs. Patient will use the walker on a regular basis at home.     Rehab Prognosis: Good; patient would benefit from acute skilled OT services to address these deficits and reach maximum level of function.       Plan:     Patient to be seen 4 x/week to address the above listed problems via self-care/home management, therapeutic activities, therapeutic exercises, neuromuscular re-education  Plan of Care Expires: 03/13/24  Plan of Care Reviewed with: patient, mother    Subjective     Chief Complaint: Pt. Reported having a head ache  Patient/Family Comments/goals: To get better      Occupational Profile:  Living Environment: pt. Resides with his mother in a ss house with a threshold step to enter. Pt. Has a tub shower with no seat  Previous level of function: Pt. Was independent with mobility and ADL tasks PTA  Roles and Routines: caretaker of self, son, community dweller  Equipment Used at Home: none  Assistance upon Discharge: mom works but owns her own business so can schedule as she wants    Pain/Comfort:  Pain Rating 1: 8/10  Location 1: head  Pain Addressed 1: Reposition, Distraction, Nurse notified  Pain Rating Post-Intervention 1: 8/10    Patients cultural, spiritual, Sabianist conflicts given the current situation: no    Objective:     Communicated with: nurse prior to session.  Patient found supine with telemetry upon OT entry to room.    General Precautions: Standard, fall, seizure  Orthopedic Precautions: N/A  Braces: N/A  Respiratory Status: Room air    Occupational Performance:    Bed Mobility:    Patient completed Supine to Sit with stand by assistance    Functional Mobility/Transfers:  Patient completed Sit <> Stand Transfer with stand by assistance  with  no assistive device   Patient completed Toilet Transfer Stand Pivot technique with minimum assistance with  no AD on toilet in room and CGA when BSC placed over toilet to increase height  Functional Mobility: Pt. Ambulated to and from the bathroom without an AD and Min A/HHA. Pt. Ambulated to and from bathroom with RW and SBA/CGA    Activities of Daily Living:  Grooming: stand by assistance in stand at sink to wash hands  Toileting: contact guard assistance when standing to clean    Cognitive/Visual Perceptual:  Cognitive/Psychosocial Skills:     -       Oriented to: Person, Place, and Time   -       Follows Commands/attention:Follows one-step commands  -       Communication: clear/fluent  -       Memory: fair  -       Safety awareness/insight to disability: impaired   -       Mood/Affect/Coping skills/emotional control:  Withdrawn  Visual/Perceptual:      -not  tested    Physical Exam:  Balance: -       sit: good; stand CGA/SBA  Postural examination/scapula alignment:    -       Rounded shoulders  -       Posterior pelvic tilt  Skin integrity: blister right hand  Upper Extremity Range of Motion:     -       Right Upper Extremity: WFL  -       Left Upper Extremity: WFL   Strength:    -       Right Upper Extremity: WFL  -       Left Upper Extremity: WFL    AMPAC 6 Click ADL:  AMPAC Total Score: 20    Treatment & Education:  Pt. And pt. Mother educated on role of OT and POC  Pt. Educated on need for staff assist for all mobility    Patient left supine with all lines intact, call button in reach, nurse notified, and mother present    GOALS:   Multidisciplinary Problems       Occupational Therapy Goals          Problem: Occupational Therapy    Goal Priority Disciplines Outcome Interventions   Occupational Therapy Goal     OT, PT/OT Ongoing, Progressing    Description: Goals to be met by: 02-26-24     Patient will increase functional independence with ADLs by performing:    UE Dressing with [place].  LE Dressing with Supervision.  Grooming while standing at sink with Supervision.  Toileting from toilet with Supervision for hygiene and clothing management.   Supine to sit with [place].  Stand pivot transfers with Supervision.  Step transfer with Supervision                         History:     Past Medical History:   Diagnosis Date    Hypertension          Past Surgical History:   Procedure Laterality Date    APPENDECTOMY      difficult airway         Time Tracking:     OT Date of Treatment: 02/12/24  OT Start Time: 0917  OT Stop Time: 0939  OT Total Time (min): 22 min    Billable Minutes:Evaluation 12  Self Care/Home Management 10    2/12/2024

## 2024-02-12 NOTE — PROGRESS NOTES
CONSULTATION LIAISON PSYCHIATRY PROGRESS NOTE    Patient Name: Nick Moffett Jr  MRN: [id number]  Patient Class: IP- Inpatient  Admission Date: 2/7/2024  Attending Physician: Jaime Ambriz MD      SUBJECTIVE:   Mr. Nick Moffett Jr. Is 43-year-old male with a PMHx of HTN and schizophrenia who presents to [facility] after being found down on the porch by family members with unknown down time.Of note, patient receives invega sustenna IM injection and received his most recent dose on 01/25.      Psychiatry consulted for hx of schizophrenia and possible NMS      Patient compliant with scheduled medication, required no involuntarty PRNs overnight.      Patient seen at bedside today and he appeared remarkably improved from when he was last seen by me on Friday. Is awake, alert, Ox3. He denies any paranoia / delusions. Endorses he has been doing well but that his head has been bothering him.  Denies acute complaints or concerns. Asked family to step out of room so that we could further discuss substance use now that patient is able to participate in the exam. Pt denies current drug use related to his current hospitalization. Pt stated that he has used opioids recreationally in the past but has not used recently. Denies SI/HI/AVH.     Spoke with family at bedside last week and today. Family is not concerned for illicit drug use and feel comfortable with patient returning home. They are going to find out patient's outpatient providers information so that the team can reach out to inform them of this hospital stay.     OBJECTIVE:    Mental Status Exam:  General Appearance: appears stated age, well developed and nourished, adequately groomed and appropriately dressed, in no acute distress  Behavior: normal, cooperative, friendly, pleasant  Involuntary Movements and Motor Activity: no abnormal involuntary movements noted  Gait and Station: unable to assess - patient lying down or seated  Speech and Language: slowed,  "soft  Mood: "Ok"  Affect: flat, calm  Thought Process and Associations: goal-directed  Thought Content and Perceptions:: no suicidal or homicidal ideation, no auditory or visual hallucinations, no paranoid ideation, no ideas of reference, no evidence of delusions or psychosis, Unable to Assess  Sensorium and Orientation: intact; alert with clear sensorium; oriented fully to person, place, time and situation  Recent and Remote Memory: impaired  Attention and Concentration: impaired  Fund of Knowledge: grossly intact, Unable to Formally Assess  Insight:  Improving  Judgment:  improving     CAM ICU positive? No      ASSESSMENT & RECOMMENDATIONS   delirium   Acute agitation   Hx of schizophrenia  R/o NMS   R/o opiate vs clonidine overdose        Schizophrenia  Given history of atypical antipsychotic depot and reported increased tone, consider avoiding antidopaminergic agents if concern for neuroleptic malignant syndrome. And continue supportive care   Will need to follow up with outpatient provider for any future medication adjustments.      Possible opiate overdose vs clonidine overdose  initial UDS was sent to lab at 2116 on 2/6 prior the patient's first dose of fentanyl recorded at 2120 on 2/6. This +opioid screen likely reflects an exposure, however, the positive fentanyl screen from 2/7 may be from the fentanyl patient received during his hospitalization.   Patient consistently denying illicit drug use or overdose   Acute agitation/ delirium   PRN: recommend stopping haldol IV 2/2 concern for NMS and use depacon 250 mg IV q6 for non-redirectable agitation      DELIRIUM  DELIRIUM BEHAVIOR MANAGEMENT  PLEASE utilize CHEMICAL restraints with PRN meds first for agitation. Minimize use of PHYSICAL restraints OR have periods of being out of physical restraints if possible.  Keep window shades open and room lit during day and room dim at night in order to promote normal sleep-wake cycles  Encourage family at bedside. " [place] patient often to situation, location, date.  Continue to Limit or Discontinue use of Narcotics, Benzos and Anti-cholinergic medications as they may worsen delirium.  Continue medical workup for causative etiology of Delirium.      OTHER PERTINENT DIAGNOSIS     RISK ASSESSMENT  Uncontested   No need for PEC at this time but would recommend patient have 1:1 sitter or camera monitor sitter. If patient were to attempt to leave AMA once he is weaned off of sedation would then recommend patient be PEC'd     FOLLOW UP  Will continue to see patient while here      DISPOSITION - once medically cleared:   Per medical team     Please contact ON CALL psychiatry service (24/7) for any acute issues that may arise.    Dr. Felicita Tenorio   Psychiatry  [facility]  2/12/2024 12:38 PM   I , Janes Zamora MD, have reviewed the attached history and exam . Pt examined personally by me today . The case discussed with the examining psychiatry resident physician . I agree the assessment and recommended treatment plan .     --------------------------------------------------------------------------------------------------------------------------------------------------------------------------------------------------------------------------------------    CONTINUED OBJECTIVE clinical data & findings reviewed and noted for above decision making    Current Medications:   Scheduled Meds:    cloNIDine  0.2 mg Oral Daily    enoxparin  40 mg Subcutaneous Q24H (prophylaxis, 1700)    melatonin  6 mg Oral Nightly    senna-docusate 8.6-50 mg  1 tablet Per OG tube Daily     PRN Meds: acetaminophen, dextrose 10%, dextrose 10%, hydrALAZINE, labetalol, lorazepam, magnesium oxide, magnesium oxide, ondansetron, potassium bicarbonate, potassium bicarbonate, potassium bicarbonate, potassium, sodium phosphates, potassium, sodium phosphates, potassium, sodium phosphates, valproate sodium (DEPACON) IVPB    Allergies:   Review of  patient's allergies indicates:  No Known Allergies    Vitals  Vitals:    02/12/24 0753   BP: (!) 181/98   Pulse: (!) 114   Resp: 16   Temp: 98.5 °F (36.9 °C)       Labs/Imaging/Studies:  No results found for this or any previous visit (from the past 24 hour(s)).

## 2024-02-12 NOTE — NURSING
Patient arrived to unit from NICU with mom.Alert and oriented x4. Denies pain at this time. Skin assessment completed. No s/s of distress noted, CLWR, BR up x3, bed low, locked in position, bed alarm engaged.Safety maintained, will continue to monitor.

## 2024-02-13 VITALS
RESPIRATION RATE: 16 BRPM | SYSTOLIC BLOOD PRESSURE: 134 MMHG | OXYGEN SATURATION: 97 % | DIASTOLIC BLOOD PRESSURE: 74 MMHG | HEIGHT: 72 IN | BODY MASS INDEX: 30.99 KG/M2 | WEIGHT: 228.81 LBS | HEART RATE: 114 BPM | TEMPERATURE: 98 F

## 2024-02-13 LAB
ALBUMIN SERPL BCP-MCNC: 3.4 G/DL (ref 3.5–5.2)
ALP SERPL-CCNC: 75 U/L (ref 55–135)
ALT SERPL W/O P-5'-P-CCNC: 37 U/L (ref 10–44)
ANION GAP SERPL CALC-SCNC: 11 MMOL/L (ref 8–16)
AST SERPL-CCNC: 31 U/L (ref 10–40)
BASOPHILS # BLD AUTO: 0.04 K/UL (ref 0–0.2)
BASOPHILS NFR BLD: 0.5 % (ref 0–1.9)
BILIRUB SERPL-MCNC: 0.5 MG/DL (ref 0.1–1)
BUN SERPL-MCNC: 13 MG/DL (ref 6–20)
CALCIUM SERPL-MCNC: 9.7 MG/DL (ref 8.7–10.5)
CHLORIDE SERPL-SCNC: 103 MMOL/L (ref 95–110)
CO2 SERPL-SCNC: 23 MMOL/L (ref 23–29)
CREAT SERPL-MCNC: 1 MG/DL (ref 0.5–1.4)
DIFFERENTIAL METHOD BLD: ABNORMAL
EOSINOPHIL # BLD AUTO: 0.7 K/UL (ref 0–0.5)
EOSINOPHIL NFR BLD: 8.7 % (ref 0–8)
ERYTHROCYTE [DISTWIDTH] IN BLOOD BY AUTOMATED COUNT: 12.3 % (ref 11.5–14.5)
EST. GFR  (NO RACE VARIABLE): >60 ML/MIN/1.73 M^2
GLUCOSE SERPL-MCNC: 106 MG/DL (ref 70–110)
HCT VFR BLD AUTO: 37.3 % (ref 40–54)
HGB BLD-MCNC: 12.4 G/DL (ref 14–18)
IMM GRANULOCYTES # BLD AUTO: 0.04 K/UL (ref 0–0.04)
IMM GRANULOCYTES NFR BLD AUTO: 0.5 % (ref 0–0.5)
LYMPHOCYTES # BLD AUTO: 1.4 K/UL (ref 1–4.8)
LYMPHOCYTES NFR BLD: 17.2 % (ref 18–48)
MAGNESIUM SERPL-MCNC: 2.1 MG/DL (ref 1.6–2.6)
MCH RBC QN AUTO: 28.8 PG (ref 27–31)
MCHC RBC AUTO-ENTMCNC: 33.2 G/DL (ref 32–36)
MCV RBC AUTO: 87 FL (ref 82–98)
MONOCYTES # BLD AUTO: 1 K/UL (ref 0.3–1)
MONOCYTES NFR BLD: 13.2 % (ref 4–15)
NEUTROPHILS # BLD AUTO: 4.7 K/UL (ref 1.8–7.7)
NEUTROPHILS NFR BLD: 59.9 % (ref 38–73)
NRBC BLD-RTO: 0 /100 WBC
PHOSPHATE SERPL-MCNC: 3.8 MG/DL (ref 2.7–4.5)
PLATELET # BLD AUTO: 352 K/UL (ref 150–450)
PMV BLD AUTO: 9.7 FL (ref 9.2–12.9)
POTASSIUM SERPL-SCNC: 3.8 MMOL/L (ref 3.5–5.1)
PROT SERPL-MCNC: 7 G/DL (ref 6–8.4)
RBC # BLD AUTO: 4.31 M/UL (ref 4.6–6.2)
SODIUM SERPL-SCNC: 137 MMOL/L (ref 136–145)
WBC # BLD AUTO: 7.85 K/UL (ref 3.9–12.7)

## 2024-02-13 PROCEDURE — 36415 COLL VENOUS BLD VENIPUNCTURE: CPT | Performed by: REGISTERED NURSE

## 2024-02-13 PROCEDURE — 63600175 PHARM REV CODE 636 W HCPCS

## 2024-02-13 PROCEDURE — 25000003 PHARM REV CODE 250: Performed by: REGISTERED NURSE

## 2024-02-13 PROCEDURE — 80053 COMPREHEN METABOLIC PANEL: CPT | Performed by: REGISTERED NURSE

## 2024-02-13 PROCEDURE — 84100 ASSAY OF PHOSPHORUS: CPT | Performed by: REGISTERED NURSE

## 2024-02-13 PROCEDURE — 83735 ASSAY OF MAGNESIUM: CPT | Performed by: REGISTERED NURSE

## 2024-02-13 PROCEDURE — 85025 COMPLETE CBC W/AUTO DIFF WBC: CPT | Performed by: REGISTERED NURSE

## 2024-02-13 RX ADMIN — CLONIDINE HYDROCHLORIDE 0.2 MG: 0.2 TABLET ORAL at 09:02

## 2024-02-13 RX ADMIN — HYDRALAZINE HYDROCHLORIDE 10 MG: 20 INJECTION, SOLUTION INTRAMUSCULAR; INTRAVENOUS at 09:02

## 2024-02-13 NOTE — DISCHARGE SUMMARY
"[facility] - Neurosurgery ([facility])  [facility]  Discharge Summary      Patient Name: Nick Moffett Jr  MRN: [id number]  TENISHA: [id number]  Patient Class: IP- Inpatient  Admission Date: 2/7/2024  Hospital Length of Stay: 6 days  Discharge Date and Time:  02/13/2024 12:30 PM  Attending Physician: Mercedes Palmer MD   Discharging Provider: Mercedes Palmer MD  Primary Care Provider: Jammie Primary Doctor  [facility] Medicine Team: [facility] Mercedes Palmer MD  Primary Care Team: [facility]    HPI:   Per [facility] " Mr. Nick Moffett Jr. Is 43-year-old male with a PMHx of HTN and schizophrenia who presents to [facility] after being found down on the porch by family members with unknown down time. Upon EMS arrival, agonal respirations with frothy sputum and pinpoint pupils were noted. Narcan was administered and pupils reportedly midpoint but remained non-reactive. .  Agonal respirations frothy sputum noted at the mouth. He also appeared to be "clenching up," concerning for fever activity. He was brought to [facility] where he was intubated for airway protection after he was noted to be severely hypercarbic in the low 70s and loaded with 2 mg ativan and 3 g keppra. Utox (+) for benzos and opioids.  Tmax at [facility] at 100.8 with WBC 20. He was administered a one time dose of both vancomycin and cefepime but does not appear to have been cultured prior to transfer.   He was transferred to [facility]  [place] hourly neuro-monitoring and a higher level of care. "    * No surgery found *      Hospital Course:   02/08/2024 EEG with diffuse disorganized low-amplitude slowing, no seizure activity. Weaning propofol and precedex. SBT failed due to acute desaturation, likely due to tachypnea, continue ventilator wean as tolerated.   02/09/2024 Self extubation in AM, O2 saturation stable and without evidence of respiratory distress. Able to vocalize and with strong cough. Post extubation, patient agitated, accusing medical team and family members of " lying and attempting to admit him to a psychiatric facility. Precedex started. Psych consulted for medication management in setting of presumed NMS.  2/10/2024: No acute events overnight. Continues on precedex infusion. Wean as tolerated. Psychiatry following.   2/11/24:No acute events, patient weaned off of propofol overnight.  2/12: Stepped down to floor. Psych rec d/c Invega and follow up with psych as OP. No need for PEC  2/13. Patient is currently medically and HDS. He is being d/c home. Stopped invega on d/c. Fu with PCP and psych as OP. ER precautions given. Plan of care discussed with patient, verbalized understanding. All questions were answered.       Goals of Care Treatment Preferences:  Code Status: Full Code      Consults:   Consults (From admission, onward)          Status Ordering Provider     Inpatient consult to Psychiatry  Once        Provider:  (Not yet assigned)    Completed ALBERTINA BRAGG     Inpatient Consult to Medical Toxicology  Once        Provider:  (Not yet assigned)    Completed PAUL FONSECA new Assessment & Plan notes have been filed under this hospital service since the last note was generated.  Service: Hospital Medicine    Final Active Diagnoses:    Diagnosis Date Noted POA    PRINCIPAL PROBLEM:  Acute encephalopathy [G93.40] 02/06/2024 Yes    Abdominal rigidity [R19.30] 02/07/2024 Yes    Elevated lactic acid level [R79.89] 02/06/2024 Yes    Respiratory acidosis with metabolic acidosis [E87.4] 02/06/2024 Yes    Elevated troponin [R79.89] 02/06/2024 Yes    Acute respiratory failure with hypercapnia [J96.02] 02/06/2024 Yes    LVH (left ventricular hypertrophy) due to hypertensive disease, without heart failure [I11.9] 10/07/2023 Yes    Tobacco abuse [Z72.0] 10/06/2023 Yes    Schizophrenia [F20.9] 10/06/2023 Yes    Hepatitis C antibody test positive [R76.8] 10/06/2023 Yes    Hypertension [I10]  Yes      Problems Resolved During this Admission:    Diagnosis Date Noted  Date Resolved POA    FREDERICK (acute kidney injury) [N17.9] 02/06/2024 02/11/2024 Yes    On mechanically assisted ventilation [Z99.11] 02/06/2024 02/11/2024 Not Applicable       Discharged Condition: stable    Disposition: Home or Self Care    Follow Up:   Follow-up Information       No, Primary Doctor Follow up in 3 day(s).                           Patient Instructions:      Ambulatory referral/consult to Psychiatry   Standing Status: Future   Referral Priority: Routine Referral Type: Psychiatric   Referral Reason: Specialty Services Required   Requested Specialty: Psychiatry   Number of Visits Requested: 1       Significant Diagnostic Studies: N/A    Pending Diagnostic Studies:       None           Medications:  Reconciled Home Medications:      Medication List        CONTINUE taking these medications      cloNIDine 0.1 MG tablet  Commonly known as: CATAPRES  Take 0.1 mg by mouth once daily.            STOP taking these medications      INVEGA SUSTENNA 156 mg/mL Syrg injection  Generic drug: paliperidone palmitate              Indwelling Lines/Drains at time of discharge:   Lines/Drains/Airways       None                   Time spent on the discharge of patient: 35 minutes         Mercedes Palmer MD  Department of Hospital Medicine  [facility] Neurosurgery ([facility])

## 2024-02-13 NOTE — PLAN OF CARE
Problem: Adult Inpatient Plan of Care  Goal: Plan of Care Review  2/12/2024 1937 by Yas Jameson RN  Outcome: Ongoing, Progressing  2/12/2024 1937 by Yas Jameson RN  Outcome: Ongoing, Progressing  Goal: Optimal Comfort and Wellbeing  2/12/2024 1937 by Yas Jameson RN  Outcome: Ongoing, Progressing  2/12/2024 1937 by Yas Jameson RN  Outcome: Ongoing, Progressing     Problem: Fluid and Electrolyte Imbalance (Acute Kidney Injury/Impairment)  Goal: Fluid and Electrolyte Balance  2/12/2024 1937 by Yas Jameson RN  Outcome: Ongoing, Progressing  2/12/2024 1937 by Yas Jameson RN  Outcome: Ongoing, Progressing     Problem: Oral Intake Inadequate (Acute Kidney Injury/Impairment)  Goal: Optimal Nutrition Intake  2/12/2024 1937 by Yas Jameson RN  Outcome: Ongoing, Progressing  2/12/2024 1937 by Yas Jameson RN  Outcome: Ongoing, Progressing

## 2024-02-13 NOTE — PLAN OF CARE
Problem: Adult Inpatient Plan of Care  Goal: Plan of Care Review  Outcome: Ongoing, Progressing  Flowsheets (Taken 2/13/2024 0450)  Plan of Care Reviewed With:   patient   mother  Goal: Absence of Hospital-Acquired Illness or Injury  Outcome: Ongoing, Progressing  Intervention: Identify and Manage Fall Risk  Flowsheets (Taken 2/13/2024 0450)  Safety Promotion/Fall Prevention:   bed alarm set   assistive device/personal item within reach   Fall Risk signage in place   Fall Risk reviewed with patient/family   side rails raised x 3   toileting scheduled   family to remain at bedside  Goal: Optimal Comfort and Wellbeing  Outcome: Ongoing, Progressing  Intervention: Monitor Pain and Promote Comfort  Flowsheets (Taken 2/13/2024 0450)  Pain Management Interventions:   pain management plan reviewed with patient/caregiver   pillow support provided   position adjusted     Bed in lowest position, bed alarm on, call light within reach and pt instructed to call if they need to get up or need any assistance. Pt requesting medication for sleep. Discussed poc/ medication education with pt and family at bedside.

## 2024-02-13 NOTE — HOSPITAL COURSE
02/08/2024 EEG with diffuse disorganized low-amplitude slowing, no seizure activity. Weaning propofol and precedex. SBT failed due to acute desaturation, likely due to tachypnea, continue ventilator wean as tolerated.   02/09/2024 Self extubation in AM, O2 saturation stable and without evidence of respiratory distress. Able to vocalize and with strong cough. Post extubation, patient agitated, accusing medical team and family members of lying and attempting to admit him to a psychiatric facility. Precedex started. Psych consulted for medication management in setting of presumed NMS.  2/10/2024: No acute events overnight. Continues on precedex infusion. Wean as tolerated. Psychiatry following.   2/11/24:No acute events, patient weaned off of propofol overnight.  2/12: Stepped down to floor. Psych rec d/c Invega and follow up with psych as OP. No need for PEC  2/13. Patient is currently medically and HDS. He is being d/c home. Stopped invega on d/c. Fu with PCP and psych as OP. ER precautions given. Plan of care discussed with patient, verbalized understanding. All questions were answered.

## 2024-02-13 NOTE — HPI
"Per [facility] " Mr. Nick Moffett . Is 43-year-old male with a PMHx of HTN and schizophrenia who presents to [facility] after being found down on the porch by family members with unknown down time. Upon EMS arrival, agonal respirations with frothy sputum and pinpoint pupils were noted. Narcan was administered and pupils reportedly midpoint but remained non-reactive. .  Agonal respirations frothy sputum noted at the mouth. He also appeared to be "clenching up," concerning for fever activity. He was brought to [facility] where he was intubated for airway protection after he was noted to be severely hypercarbic in the low 70s and loaded with 2 mg ativan and 3 g keppra. Utox (+) for benzos and opioids.  Tmax at [facility] at 100.8 with WBC 20. He was administered a one time dose of both vancomycin and cefepime but does not appear to have been cultured prior to transfer.   He was transferred to [facility] at [facility]  for hourly neuro-monitoring and a higher level of care. "  "

## 2024-02-13 NOTE — PLAN OF CARE
Problem: Adult Inpatient Plan of Care  Goal: Plan of Care Review  Outcome: Met  Goal: Patient-Specific Goal (Individualized)  Outcome: Met  Goal: Absence of Hospital-Acquired Illness or Injury  Outcome: Met  Goal: Optimal Comfort and Wellbeing  Outcome: Met  Goal: Readiness for Transition of Care  Outcome: Met     Problem: Fluid and Electrolyte Imbalance (Acute Kidney Injury/Impairment)  Goal: Fluid and Electrolyte Balance  Outcome: Met     Problem: Oral Intake Inadequate (Acute Kidney Injury/Impairment)  Goal: Optimal Nutrition Intake  Outcome: Met     Problem: Renal Function Impairment (Acute Kidney Injury/Impairment)  Goal: Effective Renal Function  Outcome: Met     Problem: Infection  Goal: Absence of Infection Signs and Symptoms  Outcome: Met     Problem: Fall Injury Risk  Goal: Absence of Fall and Fall-Related Injury  Outcome: Met     Problem: Restraint, Nonbehavioral (Nonviolent)  Goal: Absence of Harm or Injury  Outcome: Met     Problem: Skin Injury Risk Increased  Goal: Skin Health and Integrity  Outcome: Met     Problem: Impaired Wound Healing  Goal: Optimal Wound Healing  Outcome: Met

## 2024-02-13 NOTE — SUBJECTIVE & OBJECTIVE
Interval History:  stepped down to   Objective:     Vitals:  Temp: 98.4 °F (36.9 °C)  Pulse: (!) 144  Rhythm: sinus tachycardia  BP: (!) 172/80  MAP (mmHg): 116  Resp: 16  SpO2: 97 %    Temp  Min: 97.5 °F (36.4 °C)  Max: 99.1 °F (37.3 °C)  Pulse  Min: 90  Max: 144  BP  Min: 132/77  Max: 183/101  MAP (mmHg)  Min: 96  Max: 135  Resp  Min: 16  Max: 18  SpO2  Min: 92 %  Max: 97 %    02/12 0701 - 02/13 0700  In: -   Out: 200 [Urine:200]   Unmeasured Output  Urine Occurrence: 1  Stool Occurrence: 0  Pad Count: 3        Physical Exam  Vitals and nursing note reviewed.   Constitutional:       General: He is awake. He is not in acute distress.     Appearance: Normal appearance. He is not ill-appearing or toxic-appearing.   HENT:      Head: Normocephalic and atraumatic.      Mouth/Throat:      Mouth: Mucous membranes are moist.      Pharynx: Oropharynx is clear.   Cardiovascular:      Rate and Rhythm: Normal rate and regular rhythm.      Pulses: Normal pulses.           Radial pulses are 2+ on the right side and 2+ on the left side.      Heart sounds: No murmur heard.     No gallop.   Pulmonary:      Effort: Pulmonary effort is normal. No respiratory distress.      Breath sounds: No stridor. No wheezing or rales.   Chest:      Chest wall: No tenderness.   Abdominal:      General: Abdomen is flat. There is no distension.      Tenderness: There is no abdominal tenderness.   Musculoskeletal:      Right lower leg: No edema.      Left lower leg: No edema.   Skin:     General: Skin is warm and dry.      Capillary Refill: Capillary refill takes less than 2 seconds.   Neurological:      General: No focal deficit present.      Mental Status: He is alert and oriented to person, place, and time. Mental status is at baseline.      Cranial Nerves: No cranial nerve deficit.      Sensory: No sensory deficit.      Motor: No weakness.   Psychiatric:         Attention and Perception: Attention normal.         Mood and Affect: Mood is  anxious.         Speech: Speech normal.         Behavior: Behavior is cooperative.         Thought Content: Thought content is paranoid and delusional. Thought content does not include homicidal or suicidal ideation.

## 2024-02-13 NOTE — PROGRESS NOTES
"[facility] Neurosurgery ([facility] Medicine  Progress Note    Patient Name: Nick Moffett Jr  MRN: [id number]  Patient Class: IP- Inpatient   Admission Date: 2/7/2024  Length of Stay: 6 days  Attending Physician: Mercedes Palmer MD  Primary Care Provider: Jammie, Primary Doctor        Subjective:     Principal Problem:Acute encephalopathy        HPI:  Per [facility] " Mr. Nick Moffett Jr. Is 43-year-old male with a PMHx of HTN and schizophrenia who presents to [facility] after being found down on the porch by family members with unknown down time. Upon EMS arrival, agonal respirations with frothy sputum and pinpoint pupils were noted. Narcan was administered and pupils reportedly midpoint but remained non-reactive. .  Agonal respirations frothy sputum noted at the mouth. He also appeared to be "clenching up," concerning for fever activity. He was brought to [facility] where he was intubated for airway protection after he was noted to be severely hypercarbic in the low 70s and loaded with 2 mg ativan and 3 g keppra. Utox (+) for benzos and opioids.  Tmax at [facility] at 100.8 with WBC 20. He was administered a one time dose of both vancomycin and cefepime but does not appear to have been cultured prior to transfer.   He was [place] [facility]  [place]-monitoring and a higher level of care. "    Overview/Hospital Course:  02/08/2024 EEG with diffuse disorganized low-amplitude slowing, no seizure activity. Weaning propofol and precedex. SBT failed due to acute desaturation, likely due to tachypnea, continue ventilator wean as tolerated.   02/09/2024 Self extubation in AM, O2 saturation stable and without evidence of respiratory distress. Able to vocalize and with strong cough. Post extubation, patient agitated, accusing medical team and family members of lying and attempting to admit him to a psychiatric facility. Precedex started. Psych consulted for medication management in setting of presumed NMS.  2/10/2024: No " acute events overnight. Continues on precedex infusion. Wean as tolerated. Psychiatry following.   2/11/24:No acute events, patient weaned off of propofol overnight.  2/12: Stepped down to floor. Psych rec d/c Invega and follow up with psych as OP. No need for PEC  2/13. Patient is currently medically and HDS. He is being d/c home. Stopped invega on d/c. Fu with PCP and psych as OP. ER precautions given. Plan of care discussed with patient, verbalized understanding. All questions were answered.      Interval History:  stepped down to HM  Objective:     Vitals:  Temp: 98.4 °F (36.9 °C)  Pulse: (!) 144  Rhythm: sinus tachycardia  BP: (!) 172/80  MAP (mmHg): 116  Resp: 16  SpO2: 97 %    Temp  Min: 97.5 °F (36.4 °C)  Max: 99.1 °F (37.3 °C)  Pulse  Min: 90  Max: 144  BP  Min: 132/77  Max: 183/101  MAP (mmHg)  Min: 96  Max: 135  Resp  Min: 16  Max: 18  SpO2  Min: 92 %  Max: 97 %    02/12 0701 - 02/13 0700  In: -   Out: 200 [Urine:200]   Unmeasured Output  Urine Occurrence: 1  Stool Occurrence: 0  Pad Count: 3        Physical Exam  Vitals and nursing note reviewed.   Constitutional:       General: He is awake. He is not in acute distress.     Appearance: Normal appearance. He is not ill-appearing or toxic-appearing.   HENT:      Head: Normocephalic and atraumatic.      Mouth/Throat:      Mouth: Mucous membranes are moist.      Pharynx: Oropharynx is clear.   Cardiovascular:      Rate and Rhythm: Normal rate and regular rhythm.      Pulses: Normal pulses.           Radial pulses are 2+ on the right side and 2+ on the left side.      Heart sounds: No murmur heard.     No gallop.   Pulmonary:      Effort: Pulmonary effort is normal. No respiratory distress.      Breath sounds: No stridor. No wheezing or rales.   Chest:      Chest wall: No tenderness.   Abdominal:      General: Abdomen is flat. There is no distension.      Tenderness: There is no abdominal tenderness.   Musculoskeletal:      Right lower leg: No edema.       Left lower leg: No edema.   Skin:     General: Skin is warm and dry.      Capillary Refill: Capillary refill takes less than 2 seconds.   Neurological:      General: No focal deficit present.      Mental Status: He is alert and oriented to person, place, and time. Mental status is at baseline.      Cranial Nerves: No cranial nerve deficit.      Sensory: No sensory deficit.      Motor: No weakness.   Psychiatric:         Attention and Perception: Attention normal.         Mood and Affect: Mood is anxious.         Speech: Speech normal.         Behavior: Behavior is cooperative.         Thought Content: Thought content is paranoid and delusional. Thought content does not include homicidal or suicidal ideation.              Assessment/Plan:       Acute encephalopathy  43M found down after an unknown amount of time. Pupils fixed and pinpoint upon arrival to ED and became midpoint after narcan administration. Utox (+) opioids and benzos. [facility] reported UE jerking for which he was given 2 mg ativan and 3 keppra prior to transfer.     - Stepdown to   - Toxicology consulted   - Psychiatry consulted  - EEG without seizure activity  - SBP <180  - EKG reviewed  - Daily CMP, Mag, Phos - replete electrolytes PRN  - Daily CBC, transfuse PRN  - Lovenox  - PT/OT/SLP as appropriate  - CM/SW consult for dispo planning     Psychiatric  Schizophrenia  Hx previous hospitalizations for exacerbations characterized by grandiose thinking, audiovisual hallucinations, +/- violent ideation     Per mother receives Invega Sustena injections q28 days d/t adherence issue with daily meds  Per chart review receives Invega Sustena      (Prior medication regimen: Geodon 60 daily, Zyprexa 50 daily, Seroquel 50 daily)     Psychiatry consulted  Discontinue Precedex     Pulmonary  Acute respiratory failure with hypercapnia  Extubated 2/9     On mechanically assisted ventilation-resolved as of 2/11/2024  See Acute respiratory failure with hypercapnia       Cardiac/Vascular  Elevated troponin  Mildly elevated troponin at [facility]  EKG just with nonspecific ST and T wave abnormality, suspect demand ischemia     LVH (left ventricular hypertrophy) due to hypertensive disease, without heart failure  Noted on EKG  Echo with LV concentric remodeling, mildly depressed EF (55-60%)     Hypertension  Clonidine 0.2 mg daily  PRN labetalol, hydralazine     Renal/  Respiratory acidosis with metabolic acidosis  See Acute respiratory failure with hypercapnia      FREDERICK (acute kidney injury)-resolved as of 2/11/2024  BUN 17, Cr 1.62 on admission  Hourly I/O  IVF  Daily CMP  Avoid nephrotoxic medications and renally dose as appropriate  Estimated Creatinine Clearance: 131.9 mL/min (based on SCr of 0.9 mg/dL).     GI  Abdominal rigidity  NG to low intermittant suction  CT ABD/Pelvis without acute change      Hepatitis C antibody test positive  Noted Hep C Ab (+) 10/06/2023     Other  Elevated lactic acid level  Lactic acid level 11 at [facility], repeat 2.6, trending  Blood cx, sputum cx pending, broad spec abx  UA not concerning for UTI        Tobacco abuse  Per chart review  Cessation counseling as appropriate            The patient is being Prophylaxed for:  Venous Thromboembolism with: Mechanical or Chemical  Stress Ulcer with: None  Ventilator Pneumonia with: not applicable  VTE Risk Mitigation (From admission, onward)           Ordered     enoxaparin injection 40 mg  Every 24 hours         02/08/24 0952     IP VTE HIGH RISK PATIENT  Once         02/07/24 0859     Place sequential compression device  Until discontinued         02/07/24 0859                    Discharge Planning   SOFIA: 2/13/2024     Code Status: Full Code   Is the patient medically ready for discharge?:     Reason for patient still in hospital (select all that apply): Patient trending condition  Discharge Plan A: Home                  Mercedes Palmer MD  Department of Hospital Medicine   [facility] - Neurosurgery ([facility])

## 2024-05-09 ENCOUNTER — HOSPITAL ENCOUNTER (EMERGENCY)
Facility: HOSPITAL | Age: 44
Discharge: HOME | End: 2024-05-10
Attending: EMERGENCY MEDICINE
Payer: MEDICAID

## 2024-05-09 DIAGNOSIS — T50.901A ACCIDENTAL DRUG OVERDOSE, INITIAL ENCOUNTER: Primary | ICD-10-CM

## 2024-05-09 DIAGNOSIS — T40.601A OPIATE OVERDOSE, ACCIDENTAL OR UNINTENTIONAL, INITIAL ENCOUNTER: ICD-10-CM

## 2024-05-09 DIAGNOSIS — T50.901A ACCIDENTAL OVERDOSE, INITIAL ENCOUNTER: ICD-10-CM

## 2024-05-09 LAB
AMPHET+METHAMPHET UR QL: NEGATIVE
BARBITURATES UR QL SCN>200 NG/ML: NEGATIVE
BENZODIAZ UR QL SCN>200 NG/ML: NEGATIVE
BZE UR QL SCN: ABNORMAL
CANNABINOIDS UR QL SCN: NEGATIVE
CREAT UR-MCNC: 46 MG/DL (ref 23–375)
METHADONE UR QL SCN>300 NG/ML: NEGATIVE
OPIATES UR QL SCN: NEGATIVE
PCP UR QL SCN>25 NG/ML: NEGATIVE
TOXICOLOGY INFORMATION: ABNORMAL

## 2024-05-09 PROCEDURE — 80354 DRUG SCREENING FENTANYL: CPT | Performed by: EMERGENCY MEDICINE

## 2024-05-09 PROCEDURE — 80307 DRUG TEST PRSMV CHEM ANLYZR: CPT | Performed by: EMERGENCY MEDICINE

## 2024-05-09 PROCEDURE — 99284 EMERGENCY DEPT VISIT MOD MDM: CPT

## 2024-05-09 RX ORDER — NALOXONE HCL 0.4 MG/ML
0.4 VIAL (ML) INJECTION
Status: DISCONTINUED | OUTPATIENT
Start: 2024-05-09 | End: 2024-05-10 | Stop reason: HOSPADM

## 2024-05-10 VITALS
OXYGEN SATURATION: 96 % | TEMPERATURE: 98 F | WEIGHT: 228 LBS | HEART RATE: 79 BPM | SYSTOLIC BLOOD PRESSURE: 164 MMHG | RESPIRATION RATE: 16 BRPM | BODY MASS INDEX: 30.92 KG/M2 | DIASTOLIC BLOOD PRESSURE: 94 MMHG

## 2024-05-10 PROCEDURE — 99284 EMERGENCY DEPT VISIT MOD MDM: CPT

## 2024-05-10 RX ORDER — NALOXONE HYDROCHLORIDE 4 MG/.1ML
SPRAY NASAL
Qty: 1 EACH | Refills: 11 | Status: ON HOLD | OUTPATIENT
Start: 2024-05-10 | End: 2024-06-03 | Stop reason: HOSPADM

## 2024-05-10 RX ORDER — NALOXONE HYDROCHLORIDE 4 MG/.1ML
SPRAY NASAL
Qty: 1 EACH | Refills: 11 | Status: ON HOLD | OUTPATIENT
Start: 2024-05-10 | End: 2024-06-03

## 2024-05-10 NOTE — ED PROVIDER NOTES
Encounter Date: 5/9/2024       History     Chief Complaint   Patient presents with    Drug Overdose     Unresponsive in car. Given narcan IN per EMS. + response     HPI  44-year-old male with a medical history of HTN presents via EMS. Pt found non-responsive in a car at the gas station. Given narcan x2 by EMS with response. On arrival to ED pt awake and interactive. Admits to snorting heroin but cannot provide any more details. Denies alcohol or any other illicits. He has no complaints at this time.    Review of patient's allergies indicates:  No Known Allergies    Past Medical History:   Diagnosis Date    Hypertension      Past Surgical History:   Procedure Laterality Date    APPENDECTOMY      difficult airway       No family history on file.  Social History     Tobacco Use    Smoking status: Every Day     Current packs/day: 1.00     Average packs/day: 1 pack/day for 27.6 years (27.6 ttl pk-yrs)     Types: Cigarettes     Start date: 10/1/1996    Smokeless tobacco: Never   Substance Use Topics    Alcohol use: Never    Drug use: Never       Physical Exam     Initial Vitals [05/09/24 2114]   BP Pulse Resp Temp SpO2   (!) 180/100 110 16 98.1 °F (36.7 °C) 98 %      MAP       --         Physical Exam    Nursing note and vitals reviewed.  Constitutional: He appears well-developed and well-nourished.   HENT:   Head: Normocephalic and atraumatic.   Eyes: EOM are normal.   Pupils 4mm bilateral and reactive   Neck: Neck supple.   Normal range of motion.  Cardiovascular:  Regular rhythm.           No murmur heard.  Tachycardia   Pulmonary/Chest: Breath sounds normal. No respiratory distress. He has no wheezes. He has no rales.   Abdominal: Abdomen is soft. He exhibits no distension. There is no abdominal tenderness.   Musculoskeletal:         General: No edema.      Cervical back: Normal range of motion and neck supple.     Neurological: He is alert and oriented to person, place, and time. He has normal strength. GCS score is  15. GCS eye subscore is 4. GCS verbal subscore is 5. GCS motor subscore is 6.   Skin: Skin is warm and dry. No rash noted.         ED Course   Procedures  Labs Reviewed   DRUG SCREEN PANEL, URINE EMERGENCY - Abnormal; Notable for the following components:       Result Value    Cocaine (Metab.) Presumptive Positive (*)     All other components within normal limits    Narrative:     Specimen Source->Urine   FENTANYL AND METABOLITE, URINE          Imaging Results    None          Medications   naloxone 0.4 mg/mL injection 0.4 mg (has no administration in time range)     Medical Decision Making  45 y/o M history of hypertension and heroin abuse presents after being found nonresponsive in a car.  Good response to Narcan.  On exam he is resting comfortably talking on the phone, in no acute distress and nontoxic appearing.  Drugs of abuse and fentanyl and metabolites sent.  Differential diagnosis includes syncope, arrhythmia although given the patient admits that he has snorted heroin most likely accidental drug overdose.  He denies suicidal thoughts.  I do not suspect that this was an intentional overdose.    Patient signed out to oncoming staff at shift change.  We will continue to monitor anticipate discharge later this evening with rx for narcan    Amount and/or Complexity of Data Reviewed  Labs: ordered.    Risk  Prescription drug management.                              Clinical Impression:  Final diagnoses:  [T50.901A] Accidental drug overdose, initial encounter (Primary)                 Kelley Olivo MD  05/10/24 0003

## 2024-05-10 NOTE — PROVIDER PROGRESS NOTES - EMERGENCY DEPT.
Encounter Date: 5/9/2024    ED Physician Progress Notes        ED Physician Hand-off Note:    ED Course: I assumed care of patient from off-going ED physician team. Briefly, Patient is a 43 yo M who presented by EMS after being found unresponsive. He woke up after administration of narcan.    At the time of signout plan was pending - observation, reassessment.    Medications given in the ED:    Medications   naloxone 0.4 mg/mL injection 0.4 mg (has no administration in time range)     12:28 AM  I re-evaluated the patient, he is awake and alert, O2 sat normal on RA. He states he recalls being at a gas station and then waking up in an ambulance  UDS is positive for cocaine    1:42 AM  Patient has not required additional Narcan and would like to go home  Disposition: discharge    Patient comfortable with plan. Patient counseled regarding exam, results, diagnosis, treatment, and plan.    Impression: Final diagnoses:  [T50.901A] Accidental drug overdose, initial encounter (Primary)  [T50.901A] Accidental overdose, initial encounter

## 2024-05-13 PROBLEM — J96.01 ACUTE HYPOXEMIC RESPIRATORY FAILURE: Status: RESOLVED | Noted: 2024-02-07 | Resolved: 2024-05-13

## 2024-05-13 PROBLEM — J96.02 ACUTE RESPIRATORY FAILURE WITH HYPERCAPNIA: Status: RESOLVED | Noted: 2024-02-06 | Resolved: 2024-05-13

## 2024-05-15 LAB
FENTANYL UR CFM-MCNC: 282.5 NG/ML
NORFENTANYL UR CFM-MCNC: 136.5 NG/ML
TOXICOLOGIST REVIEW: NORMAL

## 2024-06-01 ENCOUNTER — HOSPITAL ENCOUNTER (INPATIENT)
Facility: HOSPITAL | Age: 44
LOS: 3 days | Discharge: HOME | DRG: 917 | End: 2024-06-04
Attending: FAMILY MEDICINE | Admitting: FAMILY MEDICINE
Payer: MEDICAID

## 2024-06-01 DIAGNOSIS — R41.82 AMS (ALTERED MENTAL STATUS): ICD-10-CM

## 2024-06-01 DIAGNOSIS — E87.20 LACTIC ACID ACIDOSIS: Primary | ICD-10-CM

## 2024-06-01 DIAGNOSIS — R73.03 PRE-DIABETES: ICD-10-CM

## 2024-06-01 PROBLEM — E87.5 HYPERKALEMIA: Status: ACTIVE | Noted: 2024-06-01

## 2024-06-01 PROCEDURE — 5A1935Z RESPIRATORY VENTILATION, LESS THAN 24 CONSECUTIVE HOURS: ICD-10-PCS | Performed by: STUDENT IN AN ORGANIZED HEALTH CARE EDUCATION/TRAINING PROGRAM

## 2024-06-01 PROCEDURE — 20000000 HC ICU ROOM

## 2024-06-01 PROCEDURE — 99900035 HC TECH TIME PER 15 MIN (STAT)

## 2024-06-01 RX ORDER — CHLORHEXIDINE GLUCONATE ORAL RINSE 1.2 MG/ML
15 SOLUTION DENTAL 2 TIMES DAILY
Status: DISCONTINUED | OUTPATIENT
Start: 2024-06-02 | End: 2024-06-04 | Stop reason: HOSPADM

## 2024-06-01 RX ORDER — MUPIROCIN 20 MG/G
OINTMENT TOPICAL 2 TIMES DAILY
Status: DISCONTINUED | OUTPATIENT
Start: 2024-06-02 | End: 2024-06-04 | Stop reason: HOSPADM

## 2024-06-01 RX ORDER — SODIUM CHLORIDE 0.9 % (FLUSH) 0.9 %
10 SYRINGE (ML) INJECTION
Status: DISCONTINUED | OUTPATIENT
Start: 2024-06-02 | End: 2024-06-04 | Stop reason: HOSPADM

## 2024-06-01 RX ORDER — PROPOFOL 10 MG/ML
0-50 INJECTION, EMULSION INTRAVENOUS CONTINUOUS
Status: DISCONTINUED | OUTPATIENT
Start: 2024-06-02 | End: 2024-06-03

## 2024-06-02 PROBLEM — F19.10 POLYSUBSTANCE ABUSE: Status: ACTIVE | Noted: 2024-06-02

## 2024-06-02 PROBLEM — D72.829 LEUKOCYTOSIS: Status: ACTIVE | Noted: 2024-06-02

## 2024-06-02 LAB
ALBUMIN SERPL BCP-MCNC: 3.9 G/DL (ref 3.5–5.2)
ALP SERPL-CCNC: 85 U/L (ref 55–135)
ALT SERPL W/O P-5'-P-CCNC: 22 U/L (ref 10–44)
ANION GAP SERPL CALC-SCNC: 21 MMOL/L (ref 8–16)
AST SERPL-CCNC: 30 U/L (ref 10–40)
BASOPHILS # BLD AUTO: 0.05 K/UL (ref 0–0.2)
BASOPHILS NFR BLD: 0.3 % (ref 0–1.9)
BILIRUB DIRECT SERPL-MCNC: 0.2 MG/DL (ref 0.1–0.3)
BILIRUB SERPL-MCNC: 0.4 MG/DL (ref 0.1–1)
BUN SERPL-MCNC: 14 MG/DL (ref 6–20)
CALCIUM SERPL-MCNC: 9.2 MG/DL (ref 8.7–10.5)
CHLORIDE SERPL-SCNC: 105 MMOL/L (ref 95–110)
CO2 SERPL-SCNC: 18 MMOL/L (ref 23–29)
CREAT SERPL-MCNC: 1.8 MG/DL (ref 0.5–1.4)
DIFFERENTIAL METHOD BLD: ABNORMAL
EOSINOPHIL # BLD AUTO: 0 K/UL (ref 0–0.5)
EOSINOPHIL NFR BLD: 0.1 % (ref 0–8)
ERYTHROCYTE [DISTWIDTH] IN BLOOD BY AUTOMATED COUNT: 13.2 % (ref 11.5–14.5)
EST. GFR  (NO RACE VARIABLE): 47 ML/MIN/1.73 M^2
GLUCOSE SERPL-MCNC: 116 MG/DL (ref 70–110)
HCT VFR BLD AUTO: 39.6 % (ref 40–54)
HGB BLD-MCNC: 12.6 G/DL (ref 14–18)
IMM GRANULOCYTES # BLD AUTO: 0.2 K/UL (ref 0–0.04)
IMM GRANULOCYTES NFR BLD AUTO: 1.1 % (ref 0–0.5)
LACTATE SERPL-SCNC: 1.1 MMOL/L (ref 0.5–2.2)
LACTATE SERPL-SCNC: 7.6 MMOL/L (ref 0.5–2.2)
LYMPHOCYTES # BLD AUTO: 2 K/UL (ref 1–4.8)
LYMPHOCYTES NFR BLD: 10.3 % (ref 18–48)
MCH RBC QN AUTO: 28.4 PG (ref 27–31)
MCHC RBC AUTO-ENTMCNC: 31.8 G/DL (ref 32–36)
MCV RBC AUTO: 89 FL (ref 82–98)
MONOCYTES # BLD AUTO: 1.6 K/UL (ref 0.3–1)
MONOCYTES NFR BLD: 8.5 % (ref 4–15)
NEUTROPHILS # BLD AUTO: 15.2 K/UL (ref 1.8–7.7)
NEUTROPHILS NFR BLD: 79.7 % (ref 38–73)
NRBC BLD-RTO: 0 /100 WBC
PLATELET # BLD AUTO: 320 K/UL (ref 150–450)
PMV BLD AUTO: 9.7 FL (ref 9.2–12.9)
POCT GLUCOSE: 133 MG/DL (ref 70–110)
POCT GLUCOSE: 149 MG/DL (ref 70–110)
POTASSIUM SERPL-SCNC: 4.4 MMOL/L (ref 3.5–5.1)
PROT SERPL-MCNC: 7.2 G/DL (ref 6–8.4)
RBC # BLD AUTO: 4.44 M/UL (ref 4.6–6.2)
SODIUM SERPL-SCNC: 144 MMOL/L (ref 136–145)
TROPONIN I SERPL DL<=0.01 NG/ML-MCNC: 0.02 NG/ML (ref 0–0.03)
WBC # BLD AUTO: 19.01 K/UL (ref 3.9–12.7)

## 2024-06-02 PROCEDURE — 25000003 PHARM REV CODE 250: Performed by: STUDENT IN AN ORGANIZED HEALTH CARE EDUCATION/TRAINING PROGRAM

## 2024-06-02 PROCEDURE — 63600175 PHARM REV CODE 636 W HCPCS: Performed by: INTERNAL MEDICINE

## 2024-06-02 PROCEDURE — 87040 BLOOD CULTURE FOR BACTERIA: CPT | Mod: 59 | Performed by: STUDENT IN AN ORGANIZED HEALTH CARE EDUCATION/TRAINING PROGRAM

## 2024-06-02 PROCEDURE — 25000003 PHARM REV CODE 250: Performed by: INTERNAL MEDICINE

## 2024-06-02 PROCEDURE — 36415 COLL VENOUS BLD VENIPUNCTURE: CPT

## 2024-06-02 PROCEDURE — 94761 N-INVAS EAR/PLS OXIMETRY MLT: CPT

## 2024-06-02 PROCEDURE — 99900035 HC TECH TIME PER 15 MIN (STAT)

## 2024-06-02 PROCEDURE — 25000003 PHARM REV CODE 250

## 2024-06-02 PROCEDURE — 25000003 PHARM REV CODE 250: Performed by: FAMILY MEDICINE

## 2024-06-02 PROCEDURE — 21400001 HC TELEMETRY ROOM

## 2024-06-02 PROCEDURE — 80048 BASIC METABOLIC PNL TOTAL CA: CPT | Performed by: STUDENT IN AN ORGANIZED HEALTH CARE EDUCATION/TRAINING PROGRAM

## 2024-06-02 PROCEDURE — 80076 HEPATIC FUNCTION PANEL: CPT | Performed by: STUDENT IN AN ORGANIZED HEALTH CARE EDUCATION/TRAINING PROGRAM

## 2024-06-02 PROCEDURE — 27200966 HC CLOSED SUCTION SYSTEM

## 2024-06-02 PROCEDURE — 63600175 PHARM REV CODE 636 W HCPCS: Performed by: STUDENT IN AN ORGANIZED HEALTH CARE EDUCATION/TRAINING PROGRAM

## 2024-06-02 PROCEDURE — 27100171 HC OXYGEN HIGH FLOW UP TO 24 HOURS

## 2024-06-02 PROCEDURE — 84484 ASSAY OF TROPONIN QUANT: CPT | Performed by: STUDENT IN AN ORGANIZED HEALTH CARE EDUCATION/TRAINING PROGRAM

## 2024-06-02 PROCEDURE — 83605 ASSAY OF LACTIC ACID: CPT | Mod: 91

## 2024-06-02 PROCEDURE — 85025 COMPLETE CBC W/AUTO DIFF WBC: CPT | Performed by: STUDENT IN AN ORGANIZED HEALTH CARE EDUCATION/TRAINING PROGRAM

## 2024-06-02 PROCEDURE — 36415 COLL VENOUS BLD VENIPUNCTURE: CPT | Performed by: STUDENT IN AN ORGANIZED HEALTH CARE EDUCATION/TRAINING PROGRAM

## 2024-06-02 PROCEDURE — 83605 ASSAY OF LACTIC ACID: CPT | Performed by: STUDENT IN AN ORGANIZED HEALTH CARE EDUCATION/TRAINING PROGRAM

## 2024-06-02 PROCEDURE — 63600175 PHARM REV CODE 636 W HCPCS: Performed by: FAMILY MEDICINE

## 2024-06-02 PROCEDURE — 94003 VENT MGMT INPAT SUBQ DAY: CPT

## 2024-06-02 RX ORDER — HYDRALAZINE HYDROCHLORIDE 25 MG/1
25 TABLET, FILM COATED ORAL EVERY 12 HOURS
Status: DISCONTINUED | OUTPATIENT
Start: 2024-06-02 | End: 2024-06-04 | Stop reason: HOSPADM

## 2024-06-02 RX ORDER — ENOXAPARIN SODIUM 100 MG/ML
40 INJECTION SUBCUTANEOUS EVERY 24 HOURS
Status: DISCONTINUED | OUTPATIENT
Start: 2024-06-02 | End: 2024-06-04 | Stop reason: HOSPADM

## 2024-06-02 RX ORDER — DEXMEDETOMIDINE HYDROCHLORIDE 4 UG/ML
0-1.4 INJECTION, SOLUTION INTRAVENOUS CONTINUOUS
Status: DISCONTINUED | OUTPATIENT
Start: 2024-06-02 | End: 2024-06-03

## 2024-06-02 RX ORDER — IBUPROFEN 200 MG
24 TABLET ORAL
Status: DISCONTINUED | OUTPATIENT
Start: 2024-06-02 | End: 2024-06-04 | Stop reason: HOSPADM

## 2024-06-02 RX ORDER — ACETAMINOPHEN 325 MG/1
650 TABLET ORAL EVERY 6 HOURS PRN
Status: DISCONTINUED | OUTPATIENT
Start: 2024-06-02 | End: 2024-06-04 | Stop reason: HOSPADM

## 2024-06-02 RX ORDER — GLUCAGON 1 MG
1 KIT INJECTION
Status: DISCONTINUED | OUTPATIENT
Start: 2024-06-02 | End: 2024-06-04 | Stop reason: HOSPADM

## 2024-06-02 RX ORDER — IBUPROFEN 200 MG
16 TABLET ORAL
Status: DISCONTINUED | OUTPATIENT
Start: 2024-06-02 | End: 2024-06-04 | Stop reason: HOSPADM

## 2024-06-02 RX ORDER — CLONIDINE HYDROCHLORIDE 0.1 MG/1
0.2 TABLET ORAL 2 TIMES DAILY
Status: DISCONTINUED | OUTPATIENT
Start: 2024-06-02 | End: 2024-06-02

## 2024-06-02 RX ORDER — DEXMEDETOMIDINE HYDROCHLORIDE 4 UG/ML
INJECTION, SOLUTION INTRAVENOUS
Status: COMPLETED
Start: 2024-06-02 | End: 2024-06-02

## 2024-06-02 RX ORDER — CLONIDINE HYDROCHLORIDE 0.1 MG/1
0.1 TABLET ORAL ONCE
Status: DISCONTINUED | OUTPATIENT
Start: 2024-06-02 | End: 2024-06-04 | Stop reason: HOSPADM

## 2024-06-02 RX ADMIN — ACETAMINOPHEN 650 MG: 325 TABLET ORAL at 07:06

## 2024-06-02 RX ADMIN — MUPIROCIN: 20 OINTMENT TOPICAL at 12:06

## 2024-06-02 RX ADMIN — DEXMEDETOMIDINE HYDROCHLORIDE 0.4 MCG/KG/HR: 4 INJECTION, SOLUTION INTRAVENOUS at 08:06

## 2024-06-02 RX ADMIN — PROPOFOL 50 MCG/KG/MIN: 10 INJECTION, EMULSION INTRAVENOUS at 07:06

## 2024-06-02 RX ADMIN — MUPIROCIN: 20 OINTMENT TOPICAL at 08:06

## 2024-06-02 RX ADMIN — CHLORHEXIDINE GLUCONATE 0.12% ORAL RINSE 15 ML: 1.2 LIQUID ORAL at 08:06

## 2024-06-02 RX ADMIN — PROPOFOL 50 MCG/KG/MIN: 10 INJECTION, EMULSION INTRAVENOUS at 12:06

## 2024-06-02 RX ADMIN — HYDRALAZINE HYDROCHLORIDE 25 MG: 25 TABLET ORAL at 06:06

## 2024-06-02 RX ADMIN — ENOXAPARIN SODIUM 40 MG: 40 INJECTION SUBCUTANEOUS at 03:06

## 2024-06-02 RX ADMIN — CLONIDINE HYDROCHLORIDE 0.2 MG: 0.1 TABLET ORAL at 12:06

## 2024-06-02 RX ADMIN — CEFEPIME 2 G: 2 INJECTION, POWDER, FOR SOLUTION INTRAVENOUS at 09:06

## 2024-06-02 RX ADMIN — CEFEPIME 2 G: 2 INJECTION, POWDER, FOR SOLUTION INTRAVENOUS at 08:06

## 2024-06-02 RX ADMIN — PROPOFOL 50 MCG/KG/MIN: 10 INJECTION, EMULSION INTRAVENOUS at 05:06

## 2024-06-02 RX ADMIN — CHLORHEXIDINE GLUCONATE 0.12% ORAL RINSE 15 ML: 1.2 LIQUID ORAL at 12:06

## 2024-06-02 RX ADMIN — VANCOMYCIN HYDROCHLORIDE 1750 MG: 1 INJECTION, POWDER, LYOPHILIZED, FOR SOLUTION INTRAVENOUS at 12:06

## 2024-06-02 NOTE — PROGRESS NOTES
"Pharmacokinetic Initial Assessment: IV Vancomycin    Assessment/Plan:    Initiate intravenous vancomycin with loading dose of 1750 mg once (20 mg/kg loading dose d/t onesimo) with subsequent doses when random concentrations are less than 20 mcg/mL  Desired empiric serum trough concentration is 15 to 20 mcg/mL  Draw vancomycin random level on 6/3 at 0100.  Pharmacy will continue to follow and monitor vancomycin.      Please contact pharmacy at extension [phone] with any questions regarding this assessment.     Thank you for the consult,   Lori Long       Patient brief summary:  Nick Moffett Jr is a 44 y.o. male initiated on antimicrobial therapy with IV Vancomycin for treatment of suspected sepsis    Drug Allergies:   Review of patient's allergies indicates:  No Known Allergies    Actual Body Weight:   93 kg    Renal Function:   Estimated Creatinine Clearance: 57.5 mL/min (A) (based on SCr of 1.8 mg/dL (H)).,     Dialysis Method (if applicable):  N/A    CBC (last 72 hours):  Recent Labs   Lab Result Units 06/01/24  1909 06/02/24  0017   WBC K/uL 37.98* 19.01*   Hemoglobin g/dL 15.0 12.6*   Hematocrit % 47.1 39.6*   Platelets K/uL 466* 320   Gran % % 70.0 79.7*   Lymph % % 11.0* 10.3*   Mono % % 6.0 8.5   Eosinophil % % 0.0 0.1   Basophil % % 0.0 0.3   Differential Method  Manual Automated       Metabolic Panel (last 72 hours):  Recent Labs   Lab Result Units 06/01/24  1909   Sodium mmol/L 137   Potassium mmol/L 6.0*   Chloride mmol/L 101   CO2 mmol/L 15*   Glucose mg/dL 289*   Glucose, UA  2+*   BUN mg/dL 13   Creatinine mg/dL 1.8*   Creatinine, Urine mg/dL 68.1   Albumin g/dL 4.6   Total Bilirubin mg/dL 0.2   Alkaline Phosphatase U/L 112   AST U/L 36   ALT U/L 29   Magnesium mg/dL 2.4   Phosphorus mg/dL 9.3*       Drug levels (last 3 results):  No results for input(s): "VANCOMYCINRA", "VANCORANDOM", "VANCOMYCINPE", "VANCOPEAK", "VANCOMYCINTR", "VANCOTROUGH" in the last 72 hours.    Microbiologic " Results:  Microbiology Results (last 7 days)       Procedure Component Value Units Date/Time    Blood culture [[id number]]     Order Status: Sent Specimen: Blood     Blood culture [[id number]]     Order Status: Sent Specimen: Blood

## 2024-06-02 NOTE — PROGRESS NOTES
Medical Decision Making    Admission Date: 2024     I have reviewed medical record data and the patient was evaluated. See Sammie's note for details. This is an attestation of a separate note written by the house officer today. As the teaching physician, I was present for and have confirmed the key portions of the service performed by the resident today. In addition to the resident's note, I add the followin yo with polysubstance use & HTN found down. No response to narcan. Give versed in field for witnessed GTC activity. Intubated for acute respiratory failure. Head CT unrevealing. U-tox (+) for cocaine, opiates, benzodiazepines. Self-extubated this morning. No distress. SpO2 on RA = 93%. Conversing although lethargic.  Lactate has improved but remains high. With leukocytosis, can't r-o infection although no clear source. Continue abx but anticipate de-escalation soon.  EEG.    Critical Care time was spent validating the history and physical exam, reviewing the lab and imaging results, and discussing the care of the patient with the bedside nurse and the patient and/or surrogates. This critical care time did not overlap with that of any other provider or involve time for any procedures.  This patient has a high probability of sudden clinically significant deterioration which requires the highest level of physician preparedness to intervene urgently. I managed/supervised life or organ supporting interventions that required frequent physician assessments. I devoted my full attention in the ICU to the direct care of this patient for this period of time. Organ systems which are failing and require intensive, critical care support are: cardiovascular, GI, infectious  Critical Care time: 35 minutes    Go Marsh MD  [facility]  [phone]

## 2024-06-02 NOTE — ASSESSMENT & PLAN NOTE
Pt was found down on 6/1 around 7 PM, unclear how long he was unconscious.   - Intubated for airway protection  - UDS positive for benzos, cocaine and opiates  - ammonia level elevated at 105  - mental status improving and is able to nod yes to questions on exam here  - sedation with propofol     ambulatory

## 2024-06-02 NOTE — ASSESSMENT & PLAN NOTE
This patient has hyperkalemia which is uncontrolled. We will monitor for arrhythmias with EKG or continuous telemetry. We will treat the hyperkalemia with Calcium gluconate and Nebulized albuterol sulfate. The likely etiology of the hyperkalemia is FREDERICK.  The patients latest potassium has been reviewed and the results are listed below  Recent Labs   Lab 06/01/24  2284   K 6.0*     - s/p albuterol and calcium gluconate at OSH  - recheck BMP  - telemetry

## 2024-06-02 NOTE — PLAN OF CARE
POC reviewed with patient and significant other at bedside, all questions and concerns answered, both verbalized understanding.     Patients has been hypertensive at times throughout shift, all other vital signs stable. Patient remains on RA and tolerating well. Patient has good UOP and no BM on day shift. For further assessment please see MAR and flow sheets   Problem: Adult Inpatient Plan of Care  Goal: Plan of Care Review  Outcome: Progressing  Goal: Patient-Specific Goal (Individualized)  Outcome: Progressing  Goal: Absence of Hospital-Acquired Illness or Injury  Outcome: Progressing  Goal: Optimal Comfort and Wellbeing  Outcome: Progressing  Goal: Readiness for Transition of Care  Outcome: Progressing     Problem: Wound  Goal: Optimal Coping  Outcome: Progressing  Goal: Optimal Functional Ability  Outcome: Progressing  Goal: Absence of Infection Signs and Symptoms  Outcome: Progressing  Goal: Improved Oral Intake  Outcome: Progressing  Goal: Optimal Pain Control and Function  Outcome: Progressing  Goal: Skin Health and Integrity  Outcome: Progressing  Goal: Optimal Wound Healing  Outcome: Progressing     Problem: Acute Kidney Injury/Impairment  Goal: Fluid and Electrolyte Balance  Outcome: Progressing  Goal: Improved Oral Intake  Outcome: Progressing  Goal: Effective Renal Function  Outcome: Progressing     Problem: Infection  Goal: Absence of Infection Signs and Symptoms  Outcome: Progressing     Problem: Mechanical Ventilation Invasive  Goal: Effective Communication  Outcome: Progressing  Goal: Optimal Device Function  Outcome: Progressing  Goal: Mechanical Ventilation Liberation  Outcome: Progressing  Goal: Optimal Nutrition Delivery  Outcome: Progressing  Goal: Absence of Device-Related Skin and Tissue Injury  Outcome: Progressing  Goal: Absence of Ventilator-Induced Lung Injury  Outcome: Progressing     Problem: Artificial Airway  Goal: Effective Communication  Outcome: Progressing  Goal: Optimal Device  Function  Outcome: Progressing  Goal: Absence of Device-Related Skin or Tissue Injury  Outcome: Progressing     Problem: Skin Injury Risk Increased  Goal: Skin Health and Integrity  Outcome: Progressing     Problem: Fall Injury Risk  Goal: Absence of Fall and Fall-Related Injury  Outcome: Progressing     Problem: Restraint, Nonviolent  Goal: Absence of Harm or Injury  Outcome: Progressing

## 2024-06-02 NOTE — SUBJECTIVE & OBJECTIVE
Past Medical History:   Diagnosis Date    Hypertension        Past Surgical History:   Procedure Laterality Date    APPENDECTOMY      difficult airway         Review of patient's allergies indicates:  No Known Allergies    Current Facility-Administered Medications on File Prior to Encounter   Medication    [COMPLETED] albuterol nebulizer solution 15 mg    [COMPLETED] calcium chloride 100 mg/mL (10 %) injection 1 g    [COMPLETED] etomidate (AMIDATE) 2 mg/mL injection    [COMPLETED] naloxone injection 2 mg    [COMPLETED] propofoL (DIPRIVAN) 10 mg/mL infusion    [COMPLETED] propofol (DIPRIVAN) 10 mg/mL IVP    [COMPLETED] sodium chloride 0.9% bolus 2,328 mL 2,328 mL    [COMPLETED] succinylcholine injection 100 mg    [DISCONTINUED] etomidate injection 20 mg    [DISCONTINUED] piperacillin-tazobactam (ZOSYN) 4.5 g in dextrose 5 % in water (D5W) 100 mL IVPB (MB+)    [DISCONTINUED] propofoL (DIPRIVAN) 10 mg/mL infusion    [DISCONTINUED] propofol (DIPRIVAN) 10 mg/mL infusion    [DISCONTINUED] propofol (DIPRIVAN) 10 mg/mL IVP    [DISCONTINUED] sodium bicarbonate 150 mEq in dextrose 5 % (D5W) 1,000 mL infusion    [DISCONTINUED] sodium bicarbonate solution 100 mEq    [DISCONTINUED] vancomycin 2 g in 0.9% sodium chloride 500 mL IVPB     Current Outpatient Medications on File Prior to Encounter   Medication Sig    cloNIDine (CATAPRES) 0.1 MG tablet Take 0.1 mg by mouth once daily.    naloxone (NARCAN) 4 mg/actuation Spry 4mg by nasal route as needed for opioid overdose; may repeat every 2-3 minutes in alternating nostrils until medical help arrives. Call 911    naloxone (NARCAN) 4 mg/actuation Spry 4mg by nasal route as needed for opioid overdose; may repeat every 2-3 minutes in alternating nostrils until medical help arrives. Call 911     Family History    None       Tobacco Use    Smoking status: Every Day     Current packs/day: 1.00     Average packs/day: 1 pack/day for 27.7 years (27.7 ttl pk-yrs)     Types: Cigarettes      Start date: 10/1/1996    Smokeless tobacco: Never   Substance and Sexual Activity    Alcohol use: Never    Drug use: Never    Sexual activity: Not on file     Review of Systems   Reason unable to perform ROS: Pt is intubated and sedated.     Objective:     Vital Signs (Most Recent):  Temp: (!) 100.4 °F (38 °C) (06/01/24 2327) Vital Signs (24h Range):  Temp:  [97.3 °F (36.3 °C)-100.4 °F (38 °C)] 100.4 °F (38 °C)  Pulse:  [] 104  Resp:  [14-32] 18  SpO2:  [95 %-100 %] 100 %  BP: ()/() 97/50     Weight: 93 kg (205 lb 0.4 oz)  Body mass index is 27.81 kg/m².     Physical Exam  Vitals reviewed.   Constitutional:       General: He is not in acute distress.     Comments: Intubated and sedated   HENT:      Mouth/Throat:      Comments: ETT in place  Eyes:      General: No scleral icterus.     Conjunctiva/sclera: Conjunctivae normal.      Pupils: Pupils are equal, round, and reactive to light.   Cardiovascular:      Rate and Rhythm: Normal rate and regular rhythm.      Pulses: Normal pulses.      Heart sounds: Normal heart sounds. No murmur heard.  Pulmonary:      Effort: Pulmonary effort is normal. No respiratory distress.      Breath sounds: Normal breath sounds.      Comments: Mechanical vent sounds  Abdominal:      General: Abdomen is flat. Bowel sounds are normal. There is no distension.      Palpations: Abdomen is soft.   Musculoskeletal:         General: No swelling.   Skin:     General: Skin is warm and dry.      Capillary Refill: Capillary refill takes less than 2 seconds.   Neurological:      Comments: Corneal and pupillary reflex intact  Arousal to voice              CRANIAL NERVES     CN III, IV, VI   Pupils are equal, round, and reactive to light.       Significant Labs: All pertinent labs within the past 24 hours have been reviewed.    Significant Imaging: I have reviewed all pertinent imaging results/findings within the past 24 hours.

## 2024-06-02 NOTE — H&P
"  [facility] Medicine  History & Physical    Patient Name: Nick Moffett Jr  MRN: [id number]  Patient Class: IP- Inpatient  Admission Date: 6/1/2024  Attending Physician: Adwoa Puga   Primary Care Provider: Jovi Norton MD         Patient information was obtained from ER records.     Subjective:     Principal Problem:<principal problem not specified>    Chief Complaint:   Chief Complaint   Patient presents with    Altered Mental Status        HPI: Per  HPI:  "Patient is a 44-year-old male with past medical history significant for hepatitis, previous drug overdose, hypertension and polysubstance abuse who is being brought in by ambulance after being found unresponsive at home by his mother.  He was down for an unknown amount of time and had been talking to his girlfriend earlier in the night.  At approximately 7:00 p.m., he disconnected and his girlfriend believes that he had gone to bed.  His mother came home and found him in his bed completely unresponsive to stimuli.  There was some occasional generalized posturing of unclear significance but has no history of seizures.  No reported bowel or bladder incontinence.  His GCS on arrival was 3 and he was subsequently intubated with RSI using etomidate and succinylcholine.  He received 5 mg of IV midazolam in route for suspected seizure.     His vital signs on admission were heart rate of 128, respiratory rate of 20, blood pressure 167/111, afebrile.  He was on oxygen on arrival 100% via Ventimask.  He was intubated and placed on ACVC with tidal volumes of 450, respiratory rate of 20, peep of 6.1.  Given a previous presentation for drug overdose with opiates, he was empirically given naloxone. He was breathing over the vent and labs were collected.  His initial ABG showed a pH of 6.986, pCO2 of 81, PO2 of 250, bicarbonate of 19.4 saturating 99% on 100% FiO2.  A CBC showed a leukocytosis of 37.98, hemoglobin 15.0, platelets " "of 466.  A metabolic panel showed sodium of 137, potassium 6.0 (shifted with albuterol), chloride of 101, CO2 of 15 with a BUN and creatinine of 13 and 1.8 respectively which is a proximally double his baseline of 0.9-1.0, glucose was 289, phosphorus was 9.3, magnesium 2.4, T bili is 0.2, AST and ALT are 36 and 29 respectively.  Ammonia was 103, initial lactate was greater than 12.  Troponin and CPK within tolerable limits but slightly elevated CPK at 462 indicating possible developing nontraumatic rhabdo.  Drug screen was positive for benzos, cocaine and opiates.  Previous historic results of also been positive for fentanyl back in February.     He is being transferred for ICU level care as well as neurology evaluation.  May require an MRI if neurological results do not improve.  Additionally may have progression of end-organ damage in the setting of prolong hypopnea given his initial ABG."    On arrival, pt is arousable to voice.        Past Medical History:   Diagnosis Date    Hypertension        Past Surgical History:   Procedure Laterality Date    APPENDECTOMY      difficult airway         Review of patient's allergies indicates:  No Known Allergies    Current Facility-Administered Medications on File Prior to Encounter   Medication    [COMPLETED] albuterol nebulizer solution 15 mg    [COMPLETED] calcium chloride 100 mg/mL (10 %) injection 1 g    [COMPLETED] etomidate (AMIDATE) 2 mg/mL injection    [COMPLETED] naloxone injection 2 mg    [COMPLETED] propofoL (DIPRIVAN) 10 mg/mL infusion    [COMPLETED] propofol (DIPRIVAN) 10 mg/mL IVP    [COMPLETED] sodium chloride 0.9% bolus 2,328 mL 2,328 mL    [COMPLETED] succinylcholine injection 100 mg    [DISCONTINUED] etomidate injection 20 mg    [DISCONTINUED] piperacillin-tazobactam (ZOSYN) 4.5 g in dextrose 5 % in water (D5W) 100 mL IVPB (MB+)    [DISCONTINUED] propofoL (DIPRIVAN) 10 mg/mL infusion    [DISCONTINUED] propofol (DIPRIVAN) 10 mg/mL infusion    " [DISCONTINUED] propofol (DIPRIVAN) 10 mg/mL IVP    [DISCONTINUED] sodium bicarbonate 150 mEq in dextrose 5 % (D5W) 1,000 mL infusion    [DISCONTINUED] sodium bicarbonate solution 100 mEq    [DISCONTINUED] vancomycin 2 g in 0.9% sodium chloride 500 mL IVPB     Current Outpatient Medications on File Prior to Encounter   Medication Sig    cloNIDine (CATAPRES) 0.1 MG tablet Take 0.1 mg by mouth once daily.    naloxone (NARCAN) 4 mg/actuation Spry 4mg by nasal route as needed for opioid overdose; may repeat every 2-3 minutes in alternating nostrils until medical help arrives. Call 911    naloxone (NARCAN) 4 mg/actuation Spry 4mg by nasal route as needed for opioid overdose; may repeat every 2-3 minutes in alternating nostrils until medical help arrives. Call 911     Family History    None       Tobacco Use    Smoking status: Every Day     Current packs/day: 1.00     Average packs/day: 1 pack/day for 27.7 years (27.7 ttl pk-yrs)     Types: Cigarettes     Start date: 10/1/1996    Smokeless tobacco: Never   Substance and Sexual Activity    Alcohol use: Never    Drug use: Never    Sexual activity: Not on file     Review of Systems   Reason unable to perform ROS: Pt is intubated and sedated.     Objective:     Vital Signs (Most Recent):  Temp: (!) 100.4 °F (38 °C) (06/01/24 2327) Vital Signs (24h Range):  Temp:  [97.3 °F (36.3 °C)-100.4 °F (38 °C)] 100.4 °F (38 °C)  Pulse:  [] 104  Resp:  [14-32] 18  SpO2:  [95 %-100 %] 100 %  BP: ()/() 97/50     Weight: 93 kg (205 lb 0.4 oz)  Body mass index is 27.81 kg/m².     Physical Exam  Vitals reviewed.   Constitutional:       General: He is not in acute distress.     Comments: Intubated and sedated   HENT:      Mouth/Throat:      Comments: ETT in place  Eyes:      General: No scleral icterus.     Conjunctiva/sclera: Conjunctivae normal.      Pupils: Pupils are equal, round, and reactive to light.   Cardiovascular:      Rate and Rhythm: Normal rate and regular rhythm.       Pulses: Normal pulses.      Heart sounds: Normal heart sounds. No murmur heard.  Pulmonary:      Effort: Pulmonary effort is normal. No respiratory distress.      Breath sounds: Normal breath sounds.      Comments: Mechanical vent sounds  Abdominal:      General: Abdomen is flat. Bowel sounds are normal. There is no distension.      Palpations: Abdomen is soft.   Musculoskeletal:         General: No swelling.   Skin:     General: Skin is warm and dry.      Capillary Refill: Capillary refill takes less than 2 seconds.   Neurological:      Comments: Corneal and pupillary reflex intact  Arousal to voice              CRANIAL NERVES     CN III, IV, VI   Pupils are equal, round, and reactive to light.       Significant Labs: All pertinent labs within the past 24 hours have been reviewed.    Significant Imaging: I have reviewed all pertinent imaging results/findings within the past 24 hours.  Assessment/Plan:     Altered mental status  Pt was found down on 6/1 around 7 PM, unclear how long he was unconscious.   - Intubated for airway protection  - CTH normal  - UDS positive for benzos, cocaine and opiates  - ammonia level elevated at 105  - mental status improving and is able to nod yes to questions on exam here  - sedation with propofol      Polysubstance abuse  UDS positive for benzo, cocaine and opiates  - substance abuse counseling  - consider inpatient vs outpatient psych evaluation      Hyperkalemia  This patient has hyperkalemia which is uncontrolled. We will monitor for arrhythmias with EKG or continuous telemetry. We will treat the hyperkalemia with Calcium gluconate and Nebulized albuterol sulfate. The likely etiology of the hyperkalemia is FREDERICK.  The patients latest potassium has been reviewed and the results are listed below  Recent Labs   Lab 06/01/24  1909   K 6.0*     - s/p albuterol and calcium gluconate at OSH  - recheck BMP  - telemetry      Lactic acid acidosis  Lactic acid >12  - initial blood gas:  6.98/81.5/253 -> 7.3/47/209   - treated with bicarb drip with improvement  - repeat lactic acid  - IVF as needed      On mechanically assisted ventilation  Intubated for airway protection  - On minimal MV setting: AC/VC. RR 20, , PEEP 5, FiO2 30%  - early liberation protocol with daily SBT    Acute renal failure  Patient with acute kidney injury/acute renal failure likely due to  unknown cause  FREDERICK is currently stable. Baseline creatinine  1.0  - Labs reviewed- Renal function/electrolytes with Estimated Creatinine Clearance: 57.5 mL/min (A) (based on SCr of 1.8 mg/dL (H)). according to latest data. Monitor urine output and serial BMP and adjust therapy as needed. Avoid nephrotoxins and renally dose meds for GFR listed above.  - Cr 1.8 at admission. Likely due to possible shock vs toxic injury vs rhabdo  - CK mildly elevated at 462  - s/p NaHCO3 fluid rescucitation  - indwelling khan  - strict I/O  - repeat BMP  - UA unremarkable      Leukocytosis   WBC 38.  - UA unremarkable   - CXR with intersitial opacities  - blood cultures ordered  - vancomycin and cefepime empirically    VTE Risk Mitigation (From admission, onward)           Ordered     IP VTE LOW RISK PATIENT  Once         06/01/24 2346     Place sequential compression device  Until discontinued         06/01/24 2346                  Critical care time spent on the evaluation and treatment of severe organ dysfunction, review of pertinent labs and imaging studies, discussions with consulting providers and discussions with patient/family: 45 minutes.                  Jovi Norton MD  Department of Hospital Medicine  [place] - Intensive Care

## 2024-06-02 NOTE — EICU
eICU Physician Brief Note    Chart reviewed. On camera, the patient is intubated and sedated, in NAD.  Mr Nick Moffett is a 44 year old man found unresponsive by his mother, unknown amount of time. He was intubated in the ED with RSI for GCS 3. ABG in the ED showed significant respiratory acidosis. UDS positive for cocaine, opiates and benzos. Trsf to the ICU for further care.  PMH: drug OD, polysubstance use, hepatitis, HTN. Difficult airway per records.  Labs and investigations reviewed.  Current medications reviewed.  A/P:  Acute respiratory failure, hypercapnic  DSI with SBT.  Lung protective ventilation.  CxR per radiology no acute infiltrate. Recommend d/c Abx (also, is on VAP coverage, yet he has not been hospitalized in the past 3 months per EMR).  Drug OD  The patient has had several ODs in the past 6 months.  Needs evaluation for possible inpatient rehab.  FREDERICK  Possibly ATN.  Monitor Cre and electrolytes.  GI/DVT prophylaxis - if intubated more than 48 hours will need SUP. Lovenox 40 mg SC ordered for DVT prophylaxis.    eICU is available should acute issues arise.

## 2024-06-02 NOTE — ASSESSMENT & PLAN NOTE
Lactic acid >12  - initial blood gas: 6.98/81.5/253 -> 7.3/47/209   - treated with bicarb drip with improvement  - repeat lactic acid  - IVF as needed

## 2024-06-02 NOTE — CONSULTS
[facility] Pulmonary/Critical Care Consult Note      Patient:Nick Moffett Jr  Age:44 y.o.  MRN:[id number]  Admit date:6/1/2024  LOS:1 day(s)       HPI:       Patient is a 44 year old male with a PMH of HTN, hepatitis, polysubstance abuse presents to the ED after being found down by his mother for an unknown duration of time. Per chart review the patient was on the phone with his girlfriend that afternoon. The girlfriend said that he stopped talking and she thought that he fell asleep. The mom came home around 7:00 p.m. and found him unresponsive. She said that the same scenario occurred in February. She is not sure whether he has been using drugs.  Given narcan without improvement in mentation. Given versed x2 for concerns of possible seizure like activity. GCS3 and intubated for airway protection.    His initial ABG showed a pH of 6.986, pCO2 of 81, PO2 of 250, bicarbonate of 19.4 saturating 99% on 100% FiO2. A CBC showed a leukocytosis of 37.98, hemoglobin 15.0, platelets of 466. A metabolic panel showed sodium of 137, potassium 6.0 (shifted with albuterol), chloride of 101, CO2 of 15 with a BUN and creatinine of 13 and 1.8 respectively which is a proximally double his baseline of 0.9-1.0, glucose was 289, phosphorus was 9.3, magnesium 2.4, T bili is 0.2, AST and ALT are 36 and 29 respectively. Ammonia was 103, initial lactate was greater than 12.      MEDICAL HISTORY:      Past Medical and Surgical History:  Past Medical History:   Diagnosis Date    Hypertension      Past Surgical History:   Procedure Laterality Date    APPENDECTOMY      difficult airway         Allergies:  Review of patient's allergies indicates:  No Known Allergies    Family History:  No family history on file.    Social History:  Social History     Tobacco Use    Smoking status: Every Day     Current packs/day: 1.00     Average packs/day: 1 pack/day for 27.7 years (27.7 ttl pk-yrs)     Types: Cigarettes     Start date: 10/1/1996    Smokeless  tobacco: Never   Substance Use Topics    Alcohol use: Never    Drug use: Never       Review of Systems:  Negative unless otherwise specified in HPI.       Objective:   Last 24 Hour Vital Signs:  BP  Min: 97/50  Max: 167/111  Temp  Av.7 °F (37.6 °C)  Min: 97.3 °F (36.3 °C)  Max: 100.7 °F (38.2 °C)  Pulse  Av.5  Min: 72  Max: 132  Resp  Av.7  Min: 14  Max: 32  SpO2  Av.5 %  Min: 95 %  Max: 100 %  Height  Av' (182.9 cm)  Min: 6' (182.9 cm)  Max: 6' (182.9 cm)  Weight  Av.6 kg (210 lb 13.9 oz)  Min: 93 kg (205 lb 0.4 oz)  Max: 98.3 kg (216 lb 11.4 oz)  Body mass index is 27.81 kg/m².  I & O (Last 24H):  Intake/Output Summary (Last 24 hours) at 2024 0813  Last data filed at 2024 0620  Gross per 24 hour   Intake 649.48 ml   Output 1000 ml   Net -350.52 ml       General: awake, cooperative, no acute distress  Head: Normocephalic, atraumatic  Lungs: Intubated and sedated, mechanical breath sounds  Heart: regular rate and rhythm, normal S1 and S2, no murmur appreciated  Abdomen: soft, non-tender, normal bowel sounds  Extremities: extremities normal, no joint deformity or tenderness noted  Skin: warm and dry, no rashes appreciated  Neuro: alert and oriented     Laboratory, Microbiology, ECG(s), and Imaging:  Reviewed.        Assessment & Plan:       NEUROLOGICAL:  Acute Encephalopathy, improved   - Patient found down and unresponsive by his mother. Given narcan x2 with no response  - Given versed x 2 for concerns of seizure activity   - UDS+ Cocaine, Benzos and opiates   - Ammonia:103  -CT Head: No acute intracranial abnormality   - Overnight on propofol, and temporarily switched to precedex for SAT/SBT. Now both are turned off.   - Consider EEG      CARDIOVASCULAR:  Hypertension  - Per patient he is on clonidine 0.2mg BID, will continue as would like to avoid rebound hypertension  - Otherwise hemodynically stable      PULMONOLOGY:  Acute Hypercapnic Respiratory Failure  - Lung Protective  Ventilation strategy with 6 cc/kg IBW, goal Pplat <[id number], goal SpO2 88-95%.  - ABG showed a pH of 6.986, pCO2 of 81, PO2 of 250  - 6/02 Extubated this morning to room air     GI/FEN:  No acute GI concerns   -   Fluids: Net even / net negative strategy  Electrolytes: CTM and correct/replete as needed  Nutrition: Renal Diet     RENAL:   Acute Kidney Injury  - Cr. 1.8 (baseline around 1.0)  -CK:462  - s/p NaHCO3 fluid rescucitation     Anion Gap Metabolic Acidosis  - Likely secondary to lactic acidosis   - initial blood gas: 6.98/81.5/253 -> 7.3/47/209   - treated with bicarb drip with improvement  - repeat lactic acid    HEMATOLOGY/ONCOLOGY:  Leukocytosis: WBC: 38>19   - Transfusion threshold <7g/dl per TRICC     ENDOCRINE:  Pre-diabetes   - A1c: 6.1 3months ago  - Hypoglycemic precautions.     INFECTIOUS DISEASE:  Leukocytosis   WBC 38.  - UA unremarkable   - CXR with intersitial opacities  - blood cultures: NGTD  - IV ABX: vancomycin and cefepime     PSYCHIATRIC:  Polysubstance abuse   - UDS positive for benzo, cocaine and opiates  - substance abuse counseling      Feeding: NPO  Analgesia: None  Sedation: propofol targeted to RASS 0- -1  VTE Ppx:   Anticoagulants       Ordered     Route Frequency Start Stop    06/02/24 0210  enoxaparin         SubQ Every 24 hours 06/02/24 0215 --           Head of Bed: 30 degrees to prevent VAP  Ulcer PPX: none  Glucose: Hypoglycemic precautions, low dose sliding scale insulin  SBT/SAT: Daily  Bowels: None  Indwelling Lines: PIV Left forearm  Abx: Vanc and Cefepime     Code Status: Full     Dispo: Likely step down to floor      Rounded with Dr. Marsh. Attestation to follow.    Elsy Cochran MD  [facility] HO-1  [facility] - Pulmonary and Critical Care Service

## 2024-06-02 NOTE — ASSESSMENT & PLAN NOTE
UDS positive for benzo, cocaine and opiates  - substance abuse counseling  - consider inpatient vs outpatient psych evaluation

## 2024-06-02 NOTE — HPI
"Per  HPI:  "Patient is a 44-year-old male with past medical history significant for hepatitis, previous drug overdose, hypertension and polysubstance abuse who is being brought in by ambulance after being found unresponsive at home by his mother.  He was down for an unknown amount of time and had been talking to his girlfriend earlier in the night.  At approximately 7:00 p.m., he disconnected and his girlfriend believes that he had gone to bed.  His mother came home and found him in his bed completely unresponsive to stimuli.  There was some occasional generalized posturing of unclear significance but has no history of seizures.  No reported bowel or bladder incontinence.  His GCS on arrival was 3 and he was subsequently intubated with RSI using etomidate and succinylcholine.  He received 5 mg of IV midazolam in route for suspected seizure.     His vital signs on admission were heart rate of 128, respiratory rate of 20, blood pressure 167/111, afebrile.  He was on oxygen on arrival 100% via Ventimask.  He was intubated and placed on ACVC with tidal volumes of 450, respiratory rate of 20, peep of 6.1.  Given a previous presentation for drug overdose with opiates, he was empirically given naloxone. He was breathing over the vent and labs were collected.  His initial ABG showed a pH of 6.986, pCO2 of 81, PO2 of 250, bicarbonate of 19.4 saturating 99% on 100% FiO2.  A CBC showed a leukocytosis of 37.98, hemoglobin 15.0, platelets of 466.  A metabolic panel showed sodium of 137, potassium 6.0 (shifted with albuterol), chloride of 101, CO2 of 15 with a BUN and creatinine of 13 and 1.8 respectively which is a proximally double his baseline of 0.9-1.0, glucose was 289, phosphorus was 9.3, magnesium 2.4, T bili is 0.2, AST and ALT are 36 and 29 respectively.  Ammonia was 103, initial lactate was greater than 12.  Troponin and CPK within tolerable limits but slightly elevated CPK at 462 indicating possible developing " "nontraumatic rhabdo.  Drug screen was positive for benzos, cocaine and opiates.  Previous historic results of also been positive for fentanyl back in February.     He is being transferred for ICU level care as well as neurology evaluation.  May require an MRI if neurological results do not improve.  Additionally may have progression of end-organ damage in the setting of prolong hypopnea given his initial ABG."    On arrival, pt is arousable to voice.      "

## 2024-06-02 NOTE — NURSING
Received patient to ICU Room 558 on venilator and propofol. Team lift to bed and placed on central monitor. R.T. assisting with Vent. ADDITIONAL piv PLACED with ultrasound per Sp BLOCK. VS wnl. Temp low grade. Provider to bedside will place orders as patient just arrived from [facility] ED. Safety maintained.

## 2024-06-02 NOTE — PLAN OF CARE
Pt received as charted on vent flowsheet. Ambu bag and mask at bedside. All alarms on and functional with adequate volume. Cuff pressure monitored via MLT. ETT size #7.5 .Pt with no apprent respiratory distress noted. Will continue to  Monitor.

## 2024-06-02 NOTE — ASSESSMENT & PLAN NOTE
Intubated for airway protection  - On minimal MV setting: AC/VC. RR 20, , PEEP 5, FiO2 30%  - early liberation protocol with daily SBT

## 2024-06-02 NOTE — NURSING
RAPID RESPONSE NURSE PROACTIVE ROUNDING NOTE       Time of Visit: 2350    Admit Date: 2024  LOS: 0  Code Status: Full Code   Date of Visit: 2024  : 1980  Age: 44 y.o.  Sex: male  Race: Black or   Bed: K558/K558 A:   MRN: [id number]  Was the patient discharged from an ICU this admission? No   Was the patient discharged from a PACU within last 24 hours? No   Did the patient receive conscious sedation/general anesthesia in last 24 hours? No   Was the patient in the ED within the past 24 hours? No   Was the patient on NIPPV within the past 24 hours? No   Attending Physician: Adwoa Puga  Primary Service: Hospitalist,Hospice and Palliative Medicine   Time spent at the bedside: < 15 min    SITUATION    Notified by bedside RN via phone call  Reason for alert: PIV access    Diagnosis: <principal problem not specified>   has a past medical history of Hypertension.    Last Vitals:  Temp: 100.4 °F (38 °C) (2327)  Pulse: 104 (2243)  Resp: 18 (2243)  BP: 97/50 (2232)  SpO2: 100 % (2243)    24 Hour Vitals Range:  Temp:  [97.3 °F (36.3 °C)-100.4 °F (38 °C)]   Pulse:  []   Resp:  [14-32]   BP: ()/()   SpO2:  [95 %-100 %]     Contacted for PIV access. 20g PIV placed to R forearm with ultrasound guidance.    FOLLOW UP    Call back the Rapid Response NurseSp at [phone] for additional questions or concerns.

## 2024-06-02 NOTE — ASSESSMENT & PLAN NOTE
WBC 38, resolved  - UA unremarkable   - CXR with intersitial opacities  - blood cultures NGTD  - vancomycin and cefepime -> ceftriaxone/azithromycin -> Levaquin at discharge

## 2024-06-02 NOTE — ASSESSMENT & PLAN NOTE
Patient with acute kidney injury/acute renal failure likely due to  unknown cause  FREDERICK is currently stable. Baseline creatinine  1.0  - Labs reviewed- Renal function/electrolytes with Estimated Creatinine Clearance: 57.5 mL/min (A) (based on SCr of 1.8 mg/dL (H)). according to latest data. Monitor urine output and serial BMP and adjust therapy as needed. Avoid nephrotoxins and renally dose meds for GFR listed above.  - Cr 1.8 at admission. Likely due to possible shock vs toxic injury vs rhabdo  - CK mildly elevated at 462  - s/p NaHCO3 fluid rescucitation  - indwelling khan  - strict I/O  - repeat BMP  - UA unremarkable

## 2024-06-03 LAB
CREAT SERPL-MCNC: 1.3 MG/DL (ref 0.5–1.4)
EST. GFR  (NO RACE VARIABLE): >60 ML/MIN/1.73 M^2
POCT GLUCOSE: 114 MG/DL (ref 70–110)
POCT GLUCOSE: 115 MG/DL (ref 70–110)
POCT GLUCOSE: 125 MG/DL (ref 70–110)
VANCOMYCIN SERPL-MCNC: 4.7 UG/ML

## 2024-06-03 PROCEDURE — 25000003 PHARM REV CODE 250: Performed by: INTERNAL MEDICINE

## 2024-06-03 PROCEDURE — 63600175 PHARM REV CODE 636 W HCPCS: Performed by: INTERNAL MEDICINE

## 2024-06-03 PROCEDURE — 25000003 PHARM REV CODE 250: Performed by: STUDENT IN AN ORGANIZED HEALTH CARE EDUCATION/TRAINING PROGRAM

## 2024-06-03 PROCEDURE — 94761 N-INVAS EAR/PLS OXIMETRY MLT: CPT

## 2024-06-03 PROCEDURE — 99223 1ST HOSP IP/OBS HIGH 75: CPT | Mod: AF,HB,, | Performed by: PSYCHIATRY & NEUROLOGY

## 2024-06-03 PROCEDURE — 11000001 HC ACUTE MED/SURG PRIVATE ROOM

## 2024-06-03 PROCEDURE — 63700000 PHARM REV CODE 250 ALT 637 W/O HCPCS: Performed by: INTERNAL MEDICINE

## 2024-06-03 PROCEDURE — 36415 COLL VENOUS BLD VENIPUNCTURE: CPT | Performed by: INTERNAL MEDICINE

## 2024-06-03 PROCEDURE — 82565 ASSAY OF CREATININE: CPT | Performed by: INTERNAL MEDICINE

## 2024-06-03 PROCEDURE — 25000003 PHARM REV CODE 250: Performed by: FAMILY MEDICINE

## 2024-06-03 PROCEDURE — 80202 ASSAY OF VANCOMYCIN: CPT | Performed by: FAMILY MEDICINE

## 2024-06-03 PROCEDURE — 99900035 HC TECH TIME PER 15 MIN (STAT)

## 2024-06-03 PROCEDURE — 90833 PSYTX W PT W E/M 30 MIN: CPT | Mod: AF,HB,, | Performed by: PSYCHIATRY & NEUROLOGY

## 2024-06-03 PROCEDURE — 36415 COLL VENOUS BLD VENIPUNCTURE: CPT | Performed by: FAMILY MEDICINE

## 2024-06-03 PROCEDURE — 63600175 PHARM REV CODE 636 W HCPCS: Performed by: STUDENT IN AN ORGANIZED HEALTH CARE EDUCATION/TRAINING PROGRAM

## 2024-06-03 RX ORDER — NALOXONE HYDROCHLORIDE 4 MG/.1ML
1 SPRAY NASAL ONCE
Qty: 2 EACH | Refills: 0 | Status: SHIPPED | OUTPATIENT
Start: 2024-06-03 | End: 2024-06-05

## 2024-06-03 RX ORDER — NALOXONE HYDROCHLORIDE 4 MG/.1ML
SPRAY NASAL
Qty: 1 EACH | Refills: 11 | Status: SHIPPED | OUTPATIENT
Start: 2024-06-03

## 2024-06-03 RX ORDER — AZITHROMYCIN 250 MG/1
500 TABLET, FILM COATED ORAL DAILY
Status: DISCONTINUED | OUTPATIENT
Start: 2024-06-03 | End: 2024-06-04 | Stop reason: HOSPADM

## 2024-06-03 RX ADMIN — AZITHROMYCIN DIHYDRATE 500 MG: 250 TABLET ORAL at 11:06

## 2024-06-03 RX ADMIN — CHLORHEXIDINE GLUCONATE 0.12% ORAL RINSE 15 ML: 1.2 LIQUID ORAL at 08:06

## 2024-06-03 RX ADMIN — VANCOMYCIN HYDROCHLORIDE 1500 MG: 1.5 INJECTION, POWDER, LYOPHILIZED, FOR SOLUTION INTRAVENOUS at 05:06

## 2024-06-03 RX ADMIN — MUPIROCIN: 20 OINTMENT TOPICAL at 08:06

## 2024-06-03 RX ADMIN — HYDRALAZINE HYDROCHLORIDE 25 MG: 25 TABLET ORAL at 08:06

## 2024-06-03 RX ADMIN — CEFEPIME 2 G: 2 INJECTION, POWDER, FOR SOLUTION INTRAVENOUS at 08:06

## 2024-06-03 RX ADMIN — ACETAMINOPHEN 650 MG: 325 TABLET ORAL at 11:06

## 2024-06-03 RX ADMIN — CEFTRIAXONE SODIUM 2 G: 2 INJECTION, POWDER, FOR SOLUTION INTRAMUSCULAR; INTRAVENOUS at 11:06

## 2024-06-03 RX ADMIN — ENOXAPARIN SODIUM 40 MG: 40 INJECTION SUBCUTANEOUS at 04:06

## 2024-06-03 NOTE — PROGRESS NOTES
Therapy with vancomycin complete and/or consult discontinued by provider.  Pharmacy will sign off, please re-consult as needed.     Thanks,    Zack cobb, Pharm. D

## 2024-06-03 NOTE — PLAN OF CARE
Pt w/o distress overnight. VSS with low grade temp max 100.6 that responded to tylenol. No events overnight. Has transfer order pending bed availability.

## 2024-06-03 NOTE — NURSING
Pt arrived to floor via wheelchair with transport and ICU nurse. Cardiac monitoring in progress, He is AAOx4 with VSS and denies any pain at this time. Orientation to room provided, bed locked in lowest position, side rails raised x2 with call light within reach. Personal items placed within reach. POC reviewed and patient verbalized understanding. WIll continue with POC.

## 2024-06-03 NOTE — PLAN OF CARE
The sw met with the pt to complete the assessment. The pt is a transfer from [facility]. The pt lives with his mother Preeti Moffett [phone] in [place]. The pt list his mother as his emergency contact. The pt admitted to Dr. Brasher he uses drugs but only a few times a month. The pt's in denial about his psych dx and states he doesn't take any prescribed psych meds. The pt was in [facility] under a PEC/CEC last year. The pt still drives but states his mother will transport him home at d/c. The pt's independent with his ADL's and doesn't use dme. The pt states he's currently employed at [employer] and wants to pursue a career as a . The pt states he did 16 years in [employer]. The sw completed the white board in the pt's room with her name and contact info. The sw gave the pt a d/c brochure with her contact info on it. The sw encouraged the pt to call if he has any further questions or concerns. The sw will continue to follow th ept throughout his transitions of care and will assist with any d/c needs.    [place] - Intensive Care  Initial Discharge Assessment       Primary Care Provider: Jovi Norton MD    Admission Diagnosis: AMS (altered mental status) [R41.82]    Admission Date: 6/1/2024  Expected Discharge Date:     Transition of Care Barriers: (P) Underinsured    Payor: MEDICAID / Plan: HE[place] BLUE [place] / Product Type: Managed Medicaid /     Extended Emergency Contact Information  Primary Emergency Contact: Preeti Moffett  Mobile Phone: [phone]  Relation: Mother    Discharge Plan A: (P) Home with family  Discharge Plan B: (P) Other (TBD)      [id number] - [place], [place] - [address]  [address]  [place] [address]  Phone: [phone] Fax: [phone]    [id number] - [place], [place] - [address] AT [facility] [address]  [address]  [place] [address]  Phone: [phone] Fax:  [phone]      Initial Assessment (most recent)       Adult Discharge Assessment - 06/03/24 1204          Discharge Assessment    Assessment Type Discharge Planning Assessment (P)      Confirmed/corrected address, phone number and insurance Yes (P)      Confirmed Demographics Correct on Facesheet (P)      Source of Information patient (P)      When was your last doctors appointment? -- (P)    a few months ago    Communicated SOFIA with patient/caregiver Date not available/Unable to determine (P)      Reason For Admission Altered mental status (P)      People in Home parent(s) (P)      Facility Arrived From: [facility] (P)      Do you expect to return to your current living situation? Yes (P)      Do you have help at home or someone to help you manage your care at home? Yes (P)      Who are your caregiver(s) and their phone number(s)? Preeti Moffett(mother)[phone] (P)      Prior to hospitilization cognitive status: Unable to Assess (P)      Current cognitive status: Alert/Oriented (P)      Walking or Climbing Stairs Difficulty no (P)      Dressing/Bathing Difficulty no (P)      Home Accessibility wheelchair accessible (P)      Home Layout Able to live on 1st floor (P)      Equipment Currently Used at Home none (P)      Readmission within 30 days? No (P)      Patient currently being followed by outpatient case management? No (P)      Do you currently have service(s) that help you manage your care at home? No (P)      Do you have prescription coverage? Yes (P)      Coverage Medicaid Healthy Blue (P)      Do you have any problems affording any of your prescribed medications? No (P)      Is the patient taking medications as prescribed? no (P)      If no, which medications is patient not taking? psych meds (P)      Who is going to help you get home at discharge? Preeti Moffett(mother)[phone] (P)      How do you get to doctors appointments? car, drives self (P)      Are you on dialysis? No (P)       Do you take coumadin? No (P)      Discharge Plan A Home with family (P)      Discharge Plan B Other (P)    TBD    DME Needed Upon Discharge  none (P)      Discharge Plan discussed with: Patient (P)      Transition of Care Barriers Underinsured (P)         Physical Activity    On average, how many days per week do you engage in moderate to strenuous exercise (like a brisk walk)? 0 days (P)      On average, how many minutes do you engage in exercise at this level? 0 min (P)         Financial Resource Strain    How hard is it for you to pay for the very basics like food, housing, medical care, and heating? Not very hard (P)         Housing Stability    In the last 12 months, was there a time when you were not able to pay the mortgage or rent on time? No (P)      At any time in the past 12 months, were you homeless or living in a shelter (including now)? No (P)         Transportation Needs    Has the lack of transportation kept you from medical appointments, meetings, work or from getting things needed for daily living? No (P)         Food Insecurity    Within the past 12 months, you worried that your food would run out before you got the money to buy more. Never true (P)      Within the past 12 months, the food you bought just didn't last and you didn't have money to get more. Never true (P)         Stress    Do you feel stress - tense, restless, nervous, or anxious, or unable to sleep at night because your mind is troubled all the time - these days? Only a little (P)         Social Isolation    How often do you feel lonely or isolated from those around you?  Never (P)         tagga    In the past 12 months has the electric, gas, oil, or water company threatened to shut off services in your home? No (P)         Health Literacy    How often do you need to have someone help you when you read instructions, pamphlets, or other written material from your doctor or pharmacy? Never (P)         OTHER    Name(s) of People  in Home Preeti Moffett(mother)[phone] (P)

## 2024-06-03 NOTE — DISCHARGE INSTRUCTIONS
REFERRAL RECOMMENDATIONS FOR SUBSTANCE ABUSE & MENTAL HEALTH      IN CASE OF SUICIDAL THINKING, call the National Suicide Hotline Number: 988    988 Suicide & Crisis Lifeline: 980 , 7-704-235-SAMI (7222)  https://988Chronicity.RECEPTA biopharma       SUBSTANCE ABUSE:     [facility] (formerly known as the ABU)  [x] [phone], Option 2  [x] [address] [facility] 4th [facility], [place] [address]  [x] https://www.ochsner.org/services/[email or web address]-recovery-program  [x] [facility] delivers comprehensive and collaborative treatment for alcohol and substance use disorders.  Excellent program for working professionals or anyone else seeking recovery.  [x] Requires insurance approval prior to starting program, call number above for more information.  [x] Intensive Outpatient Rehabilitation Program - M-F 9am-3pm - daily groups with psychologists and social workers, sessions with MDs 3x per week   [x] Ambulatory detox and dual diagnosis available      SUBOXONE:     NOTE: some Suboxone clinics require their clients to participate in a structured program (such as an IOP) in order to be prescribed Suboxone.  Some clinics have a long waiting list.  Most of these clinics do not accept walk-in clients, so call first to to learn what must be done to get started on Suboxone.    [facility] - [phone] (can do Sublocade)  [address] [facility], [place] [address]    [address]  [phone]    [facility] - [phone] (can do Sublocade)  [address] [place] [address]    [facility]  [address] [place]  [phone]     [facility] (very short waiting list, may accept some walk-in's but call first if possible)  [address], [place] [address]  [phone]; [phone]    [facility]   [address] [place]    [phone]    [facility] (can usually be seen within a week but is cash only  for appointment)  [address] [facility] ([facility])  [address]. [place], [place]  [phone]    [facility] ([facility])  [address] [facility] [address]  [phone]    [place], [place]:    [facility] - [address] [place] [address] - Tel: [phone]    Shorty Elma - [address] [place] [address] - Tel: [phone]    Victor Manuel Prater - [address] [place] [address] - Tel: [phone]    Jaison Mcintosh - [address] [place] [address] - Tel: [phone]    Charli Stephenson - [address] [place] [address] - Tel: [phone]    [place], [place]:     Dr. Laurence Mccormick and Dr. Ryder Max - [address], [place] - Tel: [phone]    Dr. Maddy Kaiser - [address] [facility] [place], [place] - Tel: [phone]    Dr. Ever Marcelino - Tel: [phone]    Dr. Ankush Lee - [facility] - [phone]      METHADONE:     [facility] (the only methadone clinic in the [facility], has two locations)  [x] [place] - [address] [place] [address], [phone]  [x] [facility] [place] [address], [phone]      12 STEP PROGRAMS (and similar):     Alcoholics Anonymous (local)  [x] [phone]  [x] www.[place].org for schedules for in-person and online meetings  [x] There are AA meetings throughout the day all over [facility]  [x] AA costs nothing to attend; they pass a basket for donations but this is not required    Narcotics Anonymous  [x] [phone]  [x] www.noana.org  [x] There are NA meetings throughout the day all over town  [x] NA costs nothing to attend; they pass a basket for donations but this is not required    Alcoholics Anonymous Online Intergroup (national)  [x] www.aa-intergroup.org  [x] Good resource for large, nation-wide meetings  [x] Can also attend smaller, local meetings in other cities  [x] Countless meetings all day and all night  [x] AA costs nothing to attend; they pass a basket for donations  but this is not required    Flying Sober - 24/7 zoom meetings for women and coed - sign on anytime, anywhere!  https://flyingExeger [place] ABsober.5Rocks/05-9-cvwfvzrs/    Online Intergroup of AA - 121 Open AA [place] Meeting - 24/7 zoom meetings  https://aa-intergroup.org/meetings/    LOOKING FOR AN ALTERNATIVE TO 12 STEP PROGRAMS - check out:  SMART Recovery: https://www.smartrecovery.org/about-us  [facility]: https://recoverydharma.org      DETOX UNITS (USUALLY 5-7 DAYS):     [facility]: [address], [place]  [phone], call first to ensure bed availability    [facility]: [address] [facility]  [phone], Option 1, call first to ensure bed availability    [facility]: [facility] [address], [facility]  [phone] (intake by appointment only)    [facility]: [address], [facility]  [phone]      INTENSIVE OUTPATIENT PROGRAMS:     [facility] (formerly known as the ABU)  [x] [phone], Option 2  [x] [address] [facility] 4th Floor, [facility] [address]  [x] https://www.[facility].org/services/[facility]-recovery-program  [x] [facility] delivers comprehensive and collaborative treatment for alcohol and substance use disorders.  Excellent program for working professionals or anyone else seeking recovery.  [x] Requires insurance approval prior to starting program, call number above for more information.  [x] Intensive Outpatient Rehabilitation Program - M-F 9am-3pm - daily groups with psychologists and social workers, sessions with MDs 3x per week   [x] Ambulatory detox and dual diagnosis available    [facility] Intensive Outpatient Program  [x] [phone]  [x] [address] [facility] (the clinic not on [facility])  [x] Call number above for more info and to check insurance requirements    [facility]  [address] [place] [address] [phone]    [facility]:  [address] [facility]  [address]?, [place], [place] [address]?, [phone]  [address] [facility]?, [place], [place] [address]?, [phone]    RESIDENTIAL REHABS (USUALLY 28 DAYS):     [facility]: [address], [phone]    [facility]: [address] [place] , [facility]  [phone] (intake by appointment only)    [facility] (men only) [address], [place], [phone]    [facility] (Female only) [address] [place], [phone]    [facility]: [address], [place], men's program [phone], women's program [phone]    [facility]: [address], [place], [phone]    [facility]: [address], [place], [place], [phone]    [facility]: Men only, [phone], [address] [facility] Kenny Sesay [facility]: [address], [place], [place], [phone]    [facility]: [facility] [place], [place],  [phone]  New Location: [address] [place] [address] [address], [place] [address], [phone]    [facility]:   ?[address] [facility]. [address]?[place], [place] [address]?[phone]    [place]: [address] [place] [address], [phone]    [place]: [place], [place], [phone]     [facility]: [place], [phone]    [facility]: SE[address] Castaneda, [phone]    [facility]: [place], [place], [phone]    [place]: [address], [phone]    [facility]: [address] [facility], [place] [address], [phone]    COMMUNITY ADDICTION CLINICS:     [facility]: [address] [facility], [address] [place] -[phone] -or- [address] [place] [address]    [facility] [place]: [address] [place]  [address]     [facility]: Clinics [phone]; Crisis [phone]    [facility]: [address] [place] [address]    On license of [facility]: [address] [place] [address]    [facility]: [address] [place] [address],    [facility] Center: 2nd Floor [address] [place] [address]    [facility]: [address], [place] [address]    [facility], [address], [place] [address]    [facility] Center: [address] [facility], [place] [phone]  (serves youth 16-23 years old)    [facility]: [facility] [phone]    [facility]: [address] [facility], [place] [phone]    [facility]: [address] [facility] [phone]    [facility]: [address] [place], [place] [address]010 [facility], [address], [phone]     [facility]: [address] [facility], [phone], [phone]    RESOURCES IN OTHER [facility]:     [facility]: [address] [facility] [place], [phone], [phone]    [facility]: [address] [facility], [address], [phone]    [facility], [address] [place] [address], [phone]    [facility]: [address] [facility], [phone]    [facility], [address] [facility], [phone] ([facility])    [facility], [address] [facility], [phone] ([facility])    [facility], [address], [place], [phone] ([facility], and [facility])    [facility], [address] , [place], [phone] ([facility])    [facility] Hotline [phone], [phone]    [facility], [address] [facility], [facility] [place], [address] [facility], [address], [place] [facility], [address] [facility]  "Kec[address] [place] [facility], [address] [facility]. [address] [place] [facility], [address], [place]    [facility] Human Services, [address] [facility], [address] [phone]    [facility], [address] [facility] [phone]    [facility] Addiction Recovery, [address] [facility] [phone]    [facility], [address] [facility], [phone]      Latvian SPEAKING (en español):     Información de la reunión de Alcohólicos Anónimos  [facility], 10:00 am  Habla español  Esta reunión está abierta y cualquiera puede asistir.    Upper sorbian speaking Alcoholics anonymous meetings:  El "Trery [place] AA Skype" es un terry on line de Alcohólicos Anónimos en [facility]. El terry es sherri, gratuito y virtual a través de Skype Audio. El terry funciona mediante scooter llamada grupal de voz, por lo que no se utiliza la videollamada, ni se pueden hailee las imágenes o rostros de los participantes. Hace devyn años y medio abrimos el primer Terry de AA por Skype en [place], rosemary actualmente asisten personas desde [place], [place], [place], [place], [place], [place], [place], [place], [place], [place], [place] y [place], entre otros.    El terry es muy útil para los alcohólicos que residen en lugares donde no se celebran reuniones de AA, o residen en lugares donde las reuniones de AA son un número limitado de días a la semana, o para aquellos compañeros que se hayan de viaje o que, por cualquier motivo, se hayan convalecientes y no pueden desplazarse. Todos los días nos reuniones a las 21:00 (hora española)    Podéis obtener más información sobre el terry y siri sesiones en la página web [email or web address]      MENTAL HEALTH:     [facility] - Outpatient Clinic  [phone]    [facility] - General " Psychiatry Intensive Outpatient Program  [facility] (formerly known as the BMU)  [phone], option 3    [facility]  Department of Psychiatry - Dual Diagnosis Intensive Outpatient Program  [facility] (formerly known as the ABU)  [phone], option 2      COMMUNITY MENTAL HEALTH CENTERS:     [facility]  (aka [facility], aka [facility])  Serves [facility].  Serves uninsured patients & those with Medicaid.  Main location: [address] [place] [id number] [phone]  Walk-in's available during regular business hours.  24/7 Crisis Line: [phone]    [facility]  (aka [facility], [facility])  Serves [facility].  Serves uninsured patients, those with Medicaid and some private plans.  Walk-in's available during regular business hours.  Primary care services available as well.  [facility]: [address] [facility] [place] [address];  [phone]  [place]: [address] [place] [address];  [phone]  24/7 Crisis Line: [phone]    [facility]  Serves uninsured patients & those with Medicaid, call for more info.  Primary care, pediatrics, HIV treatment, and dentistry services available as well.  Three locations.  [phone]    [facility]?Corporate Office  Serves patients with Medicaid, Medicare, and private insurance  [address]  [place],?[place] [address] [phone]    [facility]  Serves uninsured on a sliding scale, as well as Medicaid, Medicare, and private plans.  Eight locations around the [facility] area.  [phone]    [facility]  Serves uninsured patients & those with Medicaid, private insurances.  Primary care available as well.  [phone]  [address] [facility] [place]  [address]    [facility] Outpatient Psychiatry  Serves veterans who were honorably discharged.  [address] [place] [address]  [phone]  24/7 Veterans Crisis Line: [phone] (Press 1)    If you have private insurance and need to find a specialist, please contact your insurance network to request a list of providers covered by your benefits.      MENTAL HEALTH/ADDICTIVE DISORDERS:     AA ([phone]), NA ([phone])   National Suicide Prevention Lifeline- Call 1-186.117.4987 Available 24 hours everyday  [facility] [phone]; Crisis Line [phone] - Call for options A-F:  Intensive Outpatient Treatment/ Day programs   [facility], please contact   [facility], please contact [phone] or [phone] to speak with an admissions counselor.  [facility] (Methadone Maintenance)   [address] [place] [address], [phone]  [address] [place] [address] [phone]  [facility], [address] [place] [address], [phone]  [place] [facility] [phone]  [place] [facility] please contact [phone]  [facility], [address] [facility], 4th [place] [address] Phone: [phone]   Acer  [place] Office: [place] [address], [phone]  [place] Office: [address] [facility], [address], [place] [address], [phone]  [place] Office: [address] [facility] [place] [address] [phone]    Outpatient Substance Abuse Treatment   [facility] (Methadone Maintenance)   [address] [place] [address], [phone]  [address] [place] [address] [phone]  [facility], [address] [place] [address], [phone]  Acer  [place] Office: [address], [place] [address], [phone]  [place] Office: [address] [facility] [address], [place] [address],  [phone]  [place] Office: [address] [place] [address] [phone]  [place] [facility], [address] [place] [address] [phone]   [facility], [address] [facility], [address], [phone]  [facility], [address] [place], [place] [phone]    Residential Substance Abuse Treatment   [facility] [address] [facility], [phone] or [phone]  [facility], [address] [facility], [phone]  [facility] (men only) [address] [place] [address], [phone]  Women at the [facility] (women only) [address] [place] [address] [phone]  [facility], [address] [place] [address] [phone]  [facility] (women only), intakes at [address] [facility], [phone]  [facility] (7-day program, $100, [address], [phone], [phone], [phone])  [facility] (Men only, [phone]), [address], Kenny (Vets*/Non-Vets)  [facility] (Men only, $400/month program fee) [address] [facility] [place], [phone]  [facility] (Women over age 39 only), [address] [facility], [phone]    Out of Area:    [place], [address] [facility] [address], [place], [place] ([phone])  [facility] (men only), [address] [facility] [place] [address], [phone]  [facility], [address] [place], [place] ([phone])  [place], [address] [place] [address] Dr. Solis, [place] ([phone])  [facility], [address] [facility], [phone]  [facility] (Women only, has to have mental illness, can be homeless or substance abuser), [phone]        DOMESTIC VIOLENCE RESOURCES:     Advocacy  [facility] ([facility])  [address] [address]    [facility], [place], operated by [facility] ? [phone]  Services provided: emergency shelter, individual advocacy,  information and referrals, group support, children's program, medical advocacy, forensic medical exams, primary care, legal assistance, counseling, safety planning, and caregiver support    [facility] HEALING AND EMPOWERMENT [place]  Confidential location  [facility] ? [phone]  Services provided: short term emergency shelter, all services provided are free of charge    [facility] CENTERS FOR COMMUNITY ADVOCACY  Multiple locations in [facility], [facility], [place], and [facility] ([place], [place], and [place])    [place] ? [phone]  Services provided: emergency shelter, individual advocacy, information and referrals, group support, children's program, medical advocacy, legal assistance in obtaining restraining orders, counseling, safety planning, and caregiver support    [facility]   Emergency Shelter   [phone]  Emergency Services ,Legal and Financial Assistance Services ,Housing Services ,Support Services     [facility] long term   [phone]  Emergency Services ,Counseling Services , Housing Services ,Support Services ,Children's Services     WOMEN WITH A VISION  [address] [place] [address]  WWAV ? [phone]  Services provided: advocacy, health education and supportive services, specializing in free healing services for marginalized groups, including LGBTQ individuals and sex workers    SEXUAL TRAUMA AWARENESS AND RESPONSE (STAR)  [address] [place] [address]    STAR ? [phone]  Services provided: individual advocacy, information and referrals, group support, medical advocacy, legal assistance, counseling, and safety planning for survivors of sexual assault    [facility] ([facility])  [address] [place] [address]  [facility] Forensic Program ? [phone]  Services provided: free forensic medical exams for sexual assault and domestic violence, which can be performed up to 5 days  after an incident. It is not necessary to make a police report to receive a forensic medical exam    Legal  [facility]  [address], [place] [address]  [facility] ? [phone]  Services provided: free emergency legal representation for survivors of doemstic violence residing in [facility]. Legal services may include temporary restraining orders, temporary child support, custody, and use of property    [facility] LEGAL SERVICES (SLLS)  [address] [facility] [facility], [place] [address]  SLLS ? [phone]  Services provided: free legal representation for survivors of domestic violence residing in [facility]. Legal services may include temporary child support, custody, and divorce      HOTLINES:     [facility] DOMESTIC VIOLENCE HOTLINE  [phone]    Services provided: free and confidential hotline for victims and survivors of domestic violence. All calls will be routed to a domestic violence service provided in the victim or survivor's area    NATIONAL HUMAN TRAFFICKING HOTLINE  ([phone] [phone]    Services provided: national anti-trafficking hotline serving victims and survivors of human trafficking. Provides information about local resources, and access to safe space to report tips, seek services, and ask for help    VIA LINK  211 or [phone]    Service provided: counselors can provide crisis counseling. Counselors can also provide information and referrals to programs which can help with needs such as food, shelter, medical care, financial assistance, mental health services, substance abuse treatment, senior services, childcare, and more      HOMELESS SHELTERS:      Homeless shelters  [facility]  Emergency shelter for individuals and families  [address]  [phone]  [facility]  Emergency shelter for men only  Meals daily 6am, 2pm, & 6pm  Clothing, case management M-F by appointment (ID/job/housing/legal assistance), mail  [address] [place]    [phone]  [place]  Emergency shelter for men  [address] [facility]  [phone]  Emergency shelter for women  [address]   [phone]  Breakfast & lunch daily, dinner M-F  Case management, job counseling services   [facility]  Emergency shelter for teens and young adults up to 20yo  [address] [place]   [phone]  [facility]  Emergency shelter for women over 19yo and their kids  [address] [place] [address]  [phone]  [facility]  Day program, meals M-F 1PM (arrive early)  Showers, laundry, hygiene kits, showers, phones, , notary services, case management, ID assistance  [address]   [phone] M-F 8am-2:30pm  [facility]  Day program  MTW 7:30am-3:30pm,  8:30am-3:30pm  Crisis intervention, employment assistance, food/clothing, hygiene kits, bus tokens, mail  [address] [facility] [address]  [phone]  [facility]  Mobile outreach for homeless persons in [facility]  [phone]  [facility]  Primary healthcare, case management, dental services, TB placement  Call ahead  [address] [facility]  [phone]  [facility]  Connects homeless people with their loved ones in other cities by providing transportation costs   [phone]      [place] RESOURCES:     [place] [facility] Crisis Response Team:    [facility] ([place], [place], [place], and [facility]) ([facility] and )  [phone]      Outpatient Mental Health & Addiction Clinic Resources for both [facility] and :    [facility] Mental Healthcare Resources  Website: www.mhr.org  Main Number: 083-829-5232    [facility] ([facility])  [address] ([address] [place] [address]  [phone]    [facility] ([facility])  [address] ([address] [facility]) [facility]  [place] [address]  [phone]    [facility]  [address] ([address]) [place] [address]  [phone]    [facility]  [address] ([address] [place] [address]) [place] [address]  [phone]      Addiction Treatment Resources for both [facility] and [facility]:    [facility] (Detox, Residential, PHP, IOP, and Aftercare Programs)  [address] [place] [address]  [phone]    [facility] (Residential, IOP, Transitional Living, and Aftercare Programs)  [address] [place] [address]  [phone]    [facility] (Inpatient, Residential, Detox, IOP, Outpatient, and Aftercare Programs)  [address] [place] [address] [place] [address]  [phone] or toll free at [phone]      Outpatient Mental Health Psychotherapy Resources for both [facility] and [facility]:    Nimo Orozco, [facility]  [address]  [place] [address]  [phone]  Specialties: Depression, Anxiety, and Life Transitions    Jonelle Sarmiento, PhD  [address] [place] [address] 90  [address]  [place] [address]  [phone]  Specialties: Testing and Evaluation, Education and Learning Disabilities, and ADHD    Sanjuanita Gama, [facility] [address] [place] [address], [place] [address]  [phone]  Specialties: Obsessive-Compulsive (OCD), Depression, and Relationship Issues    [facility] [address] [facility] [place] [address]  [phone]  Specialties: Trauma & PTSD, Mood Disorders, and Anxiety    Celine Kay, PhD, [facility]  [facility] [address] [place] [address]  [phone]  Specialties: Family Conflict, Child, and Relationship Issues    Irene Melendrez, [facility] [place] [address] [phone]  Specialties: Anxiety, Depression, and Anger Management        IN CASE OF SUICIDAL THINKING,  call the National Suicide Hotline Number: 988    988 Suicide & Crisis Lifeline: 988 , 9-307-026-TALK 8255)  Provides 24/7, free and confidential support for people in distress, prevention and crisis resources for you or your loved ones, and best practices for professionals.    Call, text or chat.  https://[employer].org

## 2024-06-03 NOTE — CONSULTS
"PSYCHIATRY INPATIENT CONSULT NOTE      6/3/2024 11:38 AM   Nick Moffett Jr   1980   [id number]           DATE OF ADMISSION: 6/1/2024 11:46 PM    SITE: [facility]    CURRENT LEGAL STATUS: Patient does not currently meet PEC criteria due to not currently being an imminent threat to self/others and not being gravely disabled 2/2 mental illness at this time.     HISTORY    Per Initial History from Primary Team:  "Patient is a 44-year-old male with past medical history significant for hepatitis, previous drug overdose, hypertension and polysubstance abuse who is being brought in by ambulance after being found unresponsive at home by his mother.  He was down for an unknown amount of time and had been talking to his girlfriend earlier in the night.  At approximately 7:00 p.m., he disconnected and his girlfriend believes that he had gone to bed.  His mother came home and found him in his bed completely unresponsive to stimuli.  There was some occasional generalized posturing of unclear significance but has no history of seizures.  No reported bowel or bladder incontinence.  His GCS on arrival was 3 and he was subsequently intubated with RSI using etomidate and succinylcholine.  He received 5 mg of IV midazolam in route for suspected seizure.  His vital signs on admission were heart rate of 128, respiratory rate of 20, blood pressure 167/111, afebrile.  He was on oxygen on arrival 100% via Ventimask.  He was intubated and placed on ACVC with tidal volumes of 450, respiratory rate of 20, peep of 6.1.  Given a previous presentation for drug overdose with opiates, he was empirically given naloxone. He was breathing over the vent and labs were collected.  His initial ABG showed a pH of 6.986, pCO2 of 81, PO2 of 250, bicarbonate of 19.4 saturating 99% on 100% FiO2.  A CBC showed a leukocytosis of 37.98, hemoglobin 15.0, platelets of 466.  A metabolic panel showed sodium of 137, potassium 6.0 (shifted with albuterol), " "chloride of 101, CO2 of 15 with a BUN and creatinine of 13 and 1.8 respectively which is a proximally double his baseline of 0.9-1.0, glucose was 289, phosphorus was 9.3, magnesium 2.4, T bili is 0.2, AST and ALT are 36 and 29 respectively.  Ammonia was 103, initial lactate was greater than 12.  Troponin and CPK within tolerable limits but slightly elevated CPK at 462 indicating possible developing nontraumatic rhabdo.  Drug screen was positive for benzos, cocaine and opiates.  Previous historic results of also been positive for fentanyl back in February.  He is being transferred for ICU level care as well as neurology evaluation.  May require an MRI if neurological results do not improve.  Additionally may have progression of end-organ damage in the setting of prolong hypopnea given his initial ABG."  On arrival, pt is arousable to voice.       Chief Complaint / Reason for Psychiatry Consult: H/o schizophrenia, recently had multiple drug overdoses, s/p extubation for cocaine OD       HPI:   Nick Moffett Jr is a 44 y.o. male with a past medical history as noted above/below and a past psychiatric history of Schizophrenia, SIPD, SIMD, and Polysubstance Abuse / Dependence (cocaine, crystal meth, opiates, and possibly benzodiazepines), currently being treated by his inpatient primary team for a principle problem of AMS / unintentional overdose.  Psychiatry was originally consulted as noted above.  The patient was seen and examined.  The chart was reviewed.  On examination today, the patient was alert and oriented to person, place, city, state, month, year, and situation.  He was CAM-ICU negative for delirium.  He has a limited recollection of his overdose leading to this admission, but he states that he does remember using cocaine and opiates (heroin vs fentanyl) "just to get high."  He adamantly denies any current or prior active or passive SI / HI.  He adamantly denies that this overdose was any type of " intentional self-harm behavior.  He initially was vague and evasive when trying to discuss his hx of polysubstance abuse / dependence, but as the interview progressed, he did acknowledge that his abuse of cocaine, crystal meth, and opiates was a problem and that he needed to achieve long-term total sobriety.  With that said, despite education, counseling, and motivational interviewing, the patient denied any desire for residential drug rehab, IOP treatment, MAT, 12 steps meetings, or outpatient MH / Addiction treatment, stating that he thought he could achieve total sobriety on his own.  He states that he does not believe that he has Schizophrenia, stating that the times that this diagnosis has been given to him, he was also using drugs.  He thinks that drug intoxication has contributed to this diagnosis historically.  For this reason, he states that he has always stopped taking his psychiatric medications after being discharged from behavioral health units.  He denies any current or recent s/s consistent with psychosis, tiffany, depression, anxiety, OCD, PTSD, or eating disorders.  Objectively, on examination, he did not exhibit any signs of psychosis, tiffany, depression, or anxiety.  His nursing team and the primary medicine MD also confirmed that he had not exhibited any signs of psychosis, tiffany, depression, or anxiety on their exams as well.  He again denies any current or prior passive/active SI/HI.  He denies any adverse effects to his current medication regimen in the hospital.  Regarding current medical/physical complaints, he endorses improving fatigue.  He denies any other medical complaints at this time.  NAD was observed during the examination.  Psychotherapy was implemented as noted below with a focus on improving polysubstance cessation / total sobriety and medication compliance.  See detailed psych ROS below.  See A/P below.      Collateral:  Attempted but was unable to reach the patient's mother, Preeti  "Zuhair, at [phone].     Per CM/SW today:  "The sw met with the pt to complete the assessment. The pt is a transfer from [facility]. The pt lives with his mother Preeit Moffett [phone] in [place]. The pt list his mother as his emergency contact. The pt admitted to Dr. Brasher he uses drugs but only a few times a month. The pt's in denial about his psych dx and states he doesn't take any prescribed psych meds. The pt was in [facility] under a PEC/CEC last year. The pt still drives but states his mother will transport him home at d/c. The pt's independent with his ADL's and doesn't use dme. The pt states he's currently employed at [employer] and wants to pursue a career as a . The pt states he did 16 years in [place]. The sw completed the white board in the pt's room with her name and contact info. The sw gave the pt a d/c brochure with her contact info on it. The sw encouraged the pt to call if he has any further questions or concerns. The sw will continue to follow th ept throughout his transitions of care and will assist with any d/c needs."      Psychiatric Review Of Systems - Currently, the patient is endorsing and/or denying the following:  (patient's endorsements are BOLDED below; if not BOLDED, then patient denied):    Denies Symptoms of Depression: diminished mood or loss of interest/anhedonia; irritability, diminished energy, change in sleep, change in appetite, diminished concentration or cognition or indecisiveness, PMA/R, excessive guilt or hopelessness or worthlessness, suicidal ideations    Denies issues with Sleep: initiation, maintenance, early morning awakening with inability to return to sleep    Denies Suicidal/Homicidal ideations: active/passive ideations, organized plans, future intentions    Denies Symptoms of psychosis: hallucinations, delusions, disorganized thinking, disorganized behavior or abnormal motor behavior, or " negative symptoms (diminshed emotional expression, avolition, anhedonia, alogia, asociality)     Denies Symptoms of tiffany or hypomania: elevated, expansive, or irritable mood with increased energy or activity; with inflated self-esteem or grandiosity, decreased need for sleep, increased rate of speech, FOI or racing thoughts, distractibility, increased goal directed activity or PMA, risky/disinhibited behavior    Denies Symptoms of JC: excessive anxiety/worry/fear, more days than not, about numerous issues, difficult to control, with restlessness, fatigue, poor concentration, irritability, muscle tension, sleep disturbance; causes functionally impairing distress     Denies Symptoms of Panic Disorder: recurrent panic attacks, precipitated or un-precipitated, source of worry and/or behavioral changes secondary; with or without agoraphobia    Denies Symptoms of PTSD: h/o trauma; re-experiencing/intrusive symptoms, avoidant behavior, negative alterations in cognition or mood, or hyperarousal symptoms; with or without dissociative symptoms     Denies Symptoms of OCD: obsessions or compulsions     Denies Symptoms of Eating Disorders: anorexia, bulimia or binging    Endorses Substance Use (stimulants (cocaine and crystal meth) and opiates): intoxication, withdrawal, tolerance, used in larger amounts or duration than intended, unsuccessful attempts to limit or quit, increased time engaging in or seeking out, cravings or strong desire to use, failure to fulfill obligations, negative consequences in social/interpersonal/occupational,/recreational areas, use in dangerous situations, medical or psychological consequences       [facility] Toolkit ASQ Suicide Screening Tool:  In the past few weeks, have you wished you were dead? Denies   In the past few weeks, have you felt that you or your family would be better off if you were dead? Denies  In the past week, have you been having thoughts about killing yourself? Denies  Have you ever  tried to kill yourself? Denies  Are you having thoughts of killing yourself right now? Denies       PSYCHOTHERAPY ADD-ON [id number]   30 (16-37*) minutes    Time: 20 minutes  Participants: Met with patient    Therapeutic Intervention Type: behavior modifying psychotherapy, supportive psychotherapy  Why chosen therapy is appropriate versus another modality: relevant to diagnosis, patient responds to this modality, evidence based practice    Target symptoms: polysubstance abuse / dependence (cocaine, crystal meth, and opiates) and hx of medication noncompliance   Primary focus: improving polysubstance cessation / total sobriety and medication compliance   Psychotherapeutic techniques: supportive and psychodynamic techniques; psycho-education; reality / insight orientation; motivational interviewing; behavioral modification; CBT; problem solving techniques and managing life stressors    Outcome monitoring methods: self-report, observation    Patient's response to intervention:  The patient's response to intervention is accepting / limited.      Progress toward goals:  The patient's progress toward goals is fair / limited.      ROS:  General ROS: negative for - chills, fever or night sweats; positive for fatigue  Ophthalmic ROS: negative for - blurry vision, double vision or eye pain  ENT ROS: negative for - sinus pain, headaches, sore throat or visual changes  Allergy and Immunology ROS: negative for - hives, itchy/watery eyes or nasal congestion  Hematological and Lymphatic ROS: negative for - bleeding problems, bruising, jaundice or pallor  Endocrine ROS: negative for - galactorrhea, hot flashes, mood swings, palpitations or temperature intolerance  Respiratory ROS: negative for - cough, hemoptysis, shortness of breath, tachypnea or wheezing  Cardiovascular ROS: negative for - chest pain, dyspnea on exertion, loss of consciousness, palpitations, rapid heart rate or shortness of breath  Gastrointestinal ROS: negative for  - appetite loss, nausea, abdominal pain, blood in stools, change in bowel habits, constipation or diarrhea  Genito-Urinary ROS: negative for - incontinence, nocturia or pelvic pain  Musculoskeletal ROS: negative for - joint stiffness, joint swelling, joint pain or muscle pain   Neurological ROS: negative for - behavioral changes, confusion, dizziness, memory loss, numbness/tingling or seizures  Dermatological ROS: negative for dry skin, hair changes, pruritus or rash  Psychiatric ROS: see detailed psychiatric ROS above in history section       Past Psychiatric History:  Previous Diagnoses: Schizophrenia, SIPD, SIMD, and Polysubstance Abuse / Dependence (cocaine, crystal meth, and opiates)   Previous Medication Trials: yes, multiple (Zyprexa, Invega Sustenna JUAN, Risperdal, Invega, and Geodon)   Previous Psychiatric Hospitalizations: yes, multiple   Previous Suicide Attempts: denies   History of Violence: denies  Current / Recent Outpatient Psychiatrist: denies     Social History:  Marital Status: has a girlfriend (GF is sober and a positive influence per patient)   Employment Status/Info: currently employed at [employer]   Education: GED and some college while incarcerated   Special Ed: denies  : denies  Catholic: [employer]ian   Housing Status: lives with his mother in [place], [place]  Hobbies/Leisure time: hanging out with his girlfriend and other friends   History of phys/sexual abuse: denies  Access to gun: denies     Family Psychiatric History: denies     Substance Abuse History:  Recreational Drugs: endorses intermittent abuse of cocaine, crystal meth, and opiates (patient was vague and evasive when trying to discuss amount and frequency)   Use of Alcohol: occasional, social use and minimal use ; denies hx of complicated withdrawal   Rehab History: denies   Tobacco Use: yes ; see below ; counseled on cessation   Social History     Tobacco Use   Smoking Status Every Day    Current packs/day: 1.00     Average packs/day: 1 pack/day for 27.7 years (27.7 ttl pk-yrs)    Types: Cigarettes    Start date: 10/1/1996   Smokeless Tobacco Never     Legal History:  Past Charges/Incarcerations: prior 15 year incarceration at [facility]  Pending charges: denies     Psychosocial Stressors: drug abuse / dependence and chronic psychiatric medication noncompliance   Functioning Relationships: good support system in his family and his girlfriend   Strengths AND Liabilities: Strength: Patient accepts guidance/feedback, Strength: Patient is expressive/articulate., Strength: Patient is motivated for change., Strength: Patient has positive support network., Liability: Patient lacks coping skills.      PAST MEDICAL & SURGICAL HISTORY   Past Medical History:   Diagnosis Date    Hypertension      Past Surgical History:   Procedure Laterality Date    APPENDECTOMY      difficult airway         NEUROLOGIC HISTORY  Seizures: denies    Head trauma with LOC: denies   CVA: denies     FAMILY HISTORY   No family history on file.    ALLERGIES   Review of patient's allergies indicates:  No Known Allergies    CURRENT MEDICATION REGIMEN   Home Meds:   Prior to Admission medications    Medication Sig Start Date End Date Taking? Authorizing Provider   cloNIDine (CATAPRES) 0.1 MG tablet Take 0.1 mg by mouth once daily. 1/25/24   Provider, Historical   naloxone (NARCAN) 4 mg/actuation Spry 4mg by nasal route as needed for opioid overdose; may repeat every 2-3 minutes in alternating nostrils until medical help arrives. Call 911 5/10/24   Kelley Olivo MD   naloxone (NARCAN) 4 mg/actuation Spry 4mg by nasal route as needed for opioid overdose; may repeat every 2-3 minutes in alternating nostrils until medical help arrives. Call 911 5/10/24   Milagro Suárez MD       OTC Meds: denies     Scheduled Meds:    azithromycin  500 mg Oral Daily    cefTRIAXone (Rocephin) IV (PEDS and ADULTS)  2 g Intravenous Q24H    chlorhexidine  15 mL Mouth/Throat BID     cloNIDine  0.1 mg Oral Once    enoxparin  40 mg Subcutaneous Q24H (prophylaxis, 1700)    hydrALAZINE  25 mg Oral Q12H    mupirocin   Nasal BID      PRN Meds:   Current Facility-Administered Medications:     acetaminophen, 650 mg, Oral, Q6H PRN    glucagon (human recombinant), 1 mg, Intramuscular, PRN    glucose, 16 g, Oral, PRN    glucose, 24 g, Oral, PRN    sodium chloride 0.9%, 10 mL, Intravenous, PRN   Psychotherapeutics (From admission, onward)      None            LABORATORY DATA   Recent Results (from the past 72 hour(s))   EKG 12-lead    Collection Time: 06/01/24  6:56 PM   Result Value Ref Range    QRS Duration 94 ms    [facility] QTC Calculation 442 ms   ISTAT PROCEDURE    Collection Time: 06/01/24  7:01 PM   Result Value Ref Range    POC PH 6.986 (LL) 7.35 - 7.45    POC PCO2 81.5 (HH) 35 - 45 mmHg    POC PO2 253 (H) 80 - 100 mmHg    POC HCO3 19.4 (L) 24 - 28 mmol/L    POC BE -12 (L) -2 to 2 mmol/L    POC SATURATED O2 99 95 - 100 %    POC TCO2 22 (L) 23 - 27 mmol/L    Rate 20     Sample ARTERIAL     Site LR     Allens Test Pass     DelSys Adult Vent     Mode AC/PRVC     Vt 450     PEEP 5     FiO2 100     Sp02 100    Acetaminophen Level    Collection Time: 06/01/24  7:09 PM   Result Value Ref Range    Acetaminophen (Tylenol), Serum <3.0 (L) 10.0 - 20.0 ug/mL   Ammonia    Collection Time: 06/01/24  7:09 PM   Result Value Ref Range    Ammonia 103 (H) 10 - 50 umol/L   CBC Auto Differential    Collection Time: 06/01/24  7:09 PM   Result Value Ref Range    WBC 37.98 (H) 3.90 - 12.70 K/uL    RBC 5.40 4.60 - 6.20 M/uL    Hemoglobin 15.0 14.0 - 18.0 g/dL    Hematocrit 47.1 40.0 - 54.0 %    MCV 87 82 - 98 fL    MCH 27.8 27.0 - 31.0 pg    MCHC 31.8 (L) 32.0 - 36.0 g/dL    RDW 13.0 11.5 - 14.5 %    Platelets 466 (H) 150 - 450 K/uL    MPV 8.9 (L) 9.2 - 12.9 fL    Immature Granulocytes CANCELED 0.0 - 0.5 %    Immature Grans (Abs) CANCELED 0.00 - 0.04 K/uL    Lymph # CANCELED 1.0 - 4.8 K/uL    Mono # CANCELED 0.3 - 1.0 K/uL     Eos # CANCELED 0.0 - 0.5 K/uL    Baso # CANCELED 0.00 - 0.20 K/uL    nRBC 0 0 /100 WBC    Gran % 70.0 38.0 - 73.0 %    Lymph % 11.0 (L) 18.0 - 48.0 %    Mono % 6.0 4.0 - 15.0 %    Eosinophil % 0.0 0.0 - 8.0 %    Basophil % 0.0 0.0 - 1.9 %    Bands 13.0 %    Differential Method Manual    CK    Collection Time: 06/01/24  7:09 PM   Result Value Ref Range     (H) 20 - 200 U/L   Comprehensive Metabolic Panel    Collection Time: 06/01/24  7:09 PM   Result Value Ref Range    Sodium 137 136 - 145 mmol/L    Potassium 6.0 (H) 3.5 - 5.1 mmol/L    Chloride 101 95 - 110 mmol/L    CO2 15 (L) 23 - 29 mmol/L    Glucose 289 (H) 70 - 110 mg/dL    BUN 13 6 - 20 mg/dL    Creatinine 1.8 (H) 0.5 - 1.4 mg/dL    Calcium 9.2 8.7 - 10.5 mg/dL    Total Protein 8.9 (H) 6.0 - 8.4 g/dL    Albumin 4.6 3.5 - 5.2 g/dL    Total Bilirubin 0.2 0.1 - 1.0 mg/dL    Alkaline Phosphatase 112 55 - 135 U/L    AST 36 10 - 40 U/L    ALT 29 10 - 44 U/L    eGFR 47.0 (A) >60 mL/min/1.73 m^2    Anion Gap 21 (H) 8 - 16 mmol/L   Drug screen panel, in-house    Collection Time: 06/01/24  7:09 PM   Result Value Ref Range    Benzodiazepines Presumptive Positive (A) Negative    Methadone metabolites Negative Negative    Cocaine (Metab.) Presumptive Positive (A) Negative    Opiate Scrn, Ur Presumptive Positive (A) Negative    Barbiturate Screen, Ur Negative Negative    Amphetamine Screen, Ur Negative Negative    THC Negative Negative    Phencyclidine Negative Negative    Creatinine, Urine 68.1 23.0 - 375.0 mg/dL    Toxicology Information SEE COMMENT    Ethanol    Collection Time: 06/01/24  7:09 PM   Result Value Ref Range    Alcohol, Serum <10 <10 mg/dL   Magnesium    Collection Time: 06/01/24  7:09 PM   Result Value Ref Range    Magnesium 2.4 1.6 - 2.6 mg/dL   Phosphorus    Collection Time: 06/01/24  7:09 PM   Result Value Ref Range    Phosphorus 9.3 (HH) 2.7 - 4.5 mg/dL   Salicylate Level    Collection Time: 06/01/24  7:09 PM   Result Value Ref Range     Salicylate Lvl <5.0 (L) 15.0 - 30.0 mg/dL   Troponin I    Collection Time: 06/01/24  7:09 PM   Result Value Ref Range    Troponin I 0.025 0.000 - 0.026 ng/mL   Urinalysis, Reflex to Urine Culture Urine, Catheterized    Collection Time: 06/01/24  7:09 PM    Specimen: Urine   Result Value Ref Range    Specimen UA Urine, Catheterized     Color, UA Yellow Yellow, Straw, Jyoti    Appearance, UA Clear Clear    pH, UA 7.0 5.0 - 8.0    Specific Gravity, UA 1.025 1.005 - 1.030    Protein, UA 2+ (A) Negative    Glucose, UA 2+ (A) Negative    Ketones, UA Negative Negative    Bilirubin (UA) Negative Negative    Occult Blood UA 1+ (A) Negative    Nitrite, UA Negative Negative    Urobilinogen, UA Negative <2.0 EU/dL    Leukocytes, UA Negative Negative   Lactic Acid, Plasma    Collection Time: 06/01/24  7:09 PM   Result Value Ref Range    Lactate (Lactic Acid) >12.0 (HH) 0.5 - 2.2 mmol/L   Urinalysis Microscopic    Collection Time: 06/01/24  7:09 PM   Result Value Ref Range    RBC, UA 7 (H) 0 - 4 /hpf    WBC, UA 5 0 - 5 /hpf    Bacteria Occasional None-Occ /hpf    Squam Epithel, UA 3 /hpf    Hyaline Casts, UA 2 (A) 0-1/lpf /lpf    Amorphous, UA Few None-Moderate    Microscopic Comment SEE COMMENT    Blood Culture #2 **CANNOT BE ORDERED STAT**    Collection Time: 06/01/24  8:35 PM    Specimen: Wrist, Right; Blood   Result Value Ref Range    Blood Culture, Routine No Growth to date     Blood Culture, Routine No Growth to date    ISTAT PROCEDURE    Collection Time: 06/01/24  8:37 PM   Result Value Ref Range    POC PH 7.306 (L) 7.35 - 7.45    POC PCO2 46.8 (H) 35 - 45 mmHg    POC PO2 209 (H) 80 - 100 mmHg    POC HCO3 23.4 (L) 24 - 28 mmol/L    POC BE -3 (L) -2 to 2 mmol/L    POC SATURATED O2 100 95 - 100 %    POC TCO2 25 23 - 27 mmol/L    Rate 20     Sample ARTERIAL     Site RR     Allens Test Pass     DelSys Adult Vent     Mode AC/PRVC     Vt 450     PEEP 5     FiO2 50     ETCO2 51     Min Vol 9.3     Sp02 99    Blood culture     Collection Time: 06/01/24  8:39 PM    Specimen: Peripheral, Antecubital, Right; Blood   Result Value Ref Range    Blood Culture, Routine No Growth to date     Blood Culture, Routine No Growth to date    Basic metabolic panel    Collection Time: 06/02/24 12:17 AM   Result Value Ref Range    Sodium 144 136 - 145 mmol/L    Potassium 4.4 3.5 - 5.1 mmol/L    Chloride 105 95 - 110 mmol/L    CO2 18 (L) 23 - 29 mmol/L    Glucose 116 (H) 70 - 110 mg/dL    BUN 14 6 - 20 mg/dL    Creatinine 1.8 (H) 0.5 - 1.4 mg/dL    Calcium 9.2 8.7 - 10.5 mg/dL    Anion Gap 21 (H) 8 - 16 mmol/L    eGFR 47 (A) >60 mL/min/1.73 m^2   Hepatic function panel    Collection Time: 06/02/24 12:17 AM   Result Value Ref Range    Total Protein 7.2 6.0 - 8.4 g/dL    Albumin 3.9 3.5 - 5.2 g/dL    Total Bilirubin 0.4 0.1 - 1.0 mg/dL    Bilirubin, Direct 0.2 0.1 - 0.3 mg/dL    AST 30 10 - 40 U/L    ALT 22 10 - 44 U/L    Alkaline Phosphatase 85 55 - 135 U/L   CBC auto differential    Collection Time: 06/02/24 12:17 AM   Result Value Ref Range    WBC 19.01 (H) 3.90 - 12.70 K/uL    RBC 4.44 (L) 4.60 - 6.20 M/uL    Hemoglobin 12.6 (L) 14.0 - 18.0 g/dL    Hematocrit 39.6 (L) 40.0 - 54.0 %    MCV 89 82 - 98 fL    MCH 28.4 27.0 - 31.0 pg    MCHC 31.8 (L) 32.0 - 36.0 g/dL    RDW 13.2 11.5 - 14.5 %    Platelets 320 150 - 450 K/uL    MPV 9.7 9.2 - 12.9 fL    Immature Granulocytes 1.1 (H) 0.0 - 0.5 %    Gran # (ANC) 15.2 (H) 1.8 - 7.7 K/uL    Immature Grans (Abs) 0.20 (H) 0.00 - 0.04 K/uL    Lymph # 2.0 1.0 - 4.8 K/uL    Mono # 1.6 (H) 0.3 - 1.0 K/uL    Eos # 0.0 0.0 - 0.5 K/uL    Baso # 0.05 0.00 - 0.20 K/uL    nRBC 0 0 /100 WBC    Gran % 79.7 (H) 38.0 - 73.0 %    Lymph % 10.3 (L) 18.0 - 48.0 %    Mono % 8.5 4.0 - 15.0 %    Eosinophil % 0.1 0.0 - 8.0 %    Basophil % 0.3 0.0 - 1.9 %    Differential Method Automated    Lactic acid, plasma    Collection Time: 06/02/24 12:17 AM   Result Value Ref Range    Lactate (Lactic Acid) 7.6 (HH) 0.5 - 2.2 mmol/L   Troponin I     "Collection Time: 06/02/24 12:17 AM   Result Value Ref Range    Troponin I 0.021 0.000 - 0.026 ng/mL   Blood culture    Collection Time: 06/02/24  3:50 AM    Specimen: Blood   Result Value Ref Range    Blood Culture, Routine No Growth to date    Blood culture    Collection Time: 06/02/24  3:50 AM    Specimen: Blood   Result Value Ref Range    Blood Culture, Routine No Growth to date    Lactic acid, plasma    Collection Time: 06/02/24 12:48 PM   Result Value Ref Range    Lactate (Lactic Acid) 1.1 0.5 - 2.2 mmol/L   POCT glucose    Collection Time: 06/02/24  4:41 PM   Result Value Ref Range    POCT Glucose 133 (H) 70 - 110 mg/dL   POCT glucose    Collection Time: 06/02/24  9:59 PM   Result Value Ref Range    POCT Glucose 149 (H) 70 - 110 mg/dL   Vancomycin, random    Collection Time: 06/03/24  1:10 AM   Result Value Ref Range    Vancomycin, Random 4.7 Not established ug/mL   POCT glucose    Collection Time: 06/03/24  6:35 AM   Result Value Ref Range    POCT Glucose 125 (H) 70 - 110 mg/dL      No results found for: "PHENYTOIN", "PHENOBARB", "VALPROATE", "CBMZ"      EXAMINATION    VITALS   Vitals:    06/03/24 0815 06/03/24 0830 06/03/24 0845 06/03/24 0900   BP:    (!) 152/67   Pulse: 65 67 81 72   Resp: 15 (!) 7 (!) 28 13   Temp:       TempSrc:       SpO2: (!) 94% 98% 98% 97%   Weight:       Height:            CONSTITUTIONAL  General Appearance: NAD, unremarkable, age appropriate, lying in bed, overweight, calm    MUSCULOSKELETAL  Muscle Strength and Tone: WNL    Abnormal Involuntary Movements: none observed   Gait and Station: WNL; non-ataxic     PSYCHIATRIC   Behavior/Cooperation:  friendly and cooperative, eye contact normal, calm  Speech:  normal tone, normal rate, normal pitch, normal volume  Language: grossly intact, able to name, able to repeat with spontaneous speech  Mood: "OK ; pretty good"  Affect:  congruent with mood and full / reactive    Associations: intact; no CARLINE  Thought Process: Linear and " Future-Oriented / Goal-Directed   Thought Content: denies SI, HI, AH, VH, TH, delusions, or paranoia (no RIS observed)   Sensorium: Awake  Alert and Oriented: to person, place, city, state, month, year, and situation   Memory: 3/3 immediate, 3/3 at 5 minutes    Recent: Limited / Intact; able to report intermittent recent events   Remote: Intact; Named 4/4 past presidents   Attention/concentration: Intact. Appropriate for age/education. Able to spell w-[id number]-r-l-d & d-l-r-o-w.   Similarities: Intact (difference between apple and orange?)  Abstract reasoning: Intact  Fund of Knowledge: Named 4/4 past presidents   Insight: Limited / Intact  Judgment: Limited / Intact    CAM ICU Delirium Assessment - NEGATIVE    Is the patient aware of the biomedical complications associated with substance abuse and mental illness? yes        MEDICAL DECISION MAKING    ASSESSMENT        Accidental / Unintentional Multiple Drug Overdose, Initial Encounter (cocaine, opiates, and benzodiazepines ; patient only attests to ingesting cocaine and opiates)  Opioid Use Disorder, Severe, Dependence   Cocaine Use Disorder, Severe, Dependence   Hx of Paranoid Schizophrenia (no current active s/s of psychosis / tiffany / depression on my exam today and per nursing staff and primary medicine MD)       RECOMMENDATIONS       - With reasonable medical certainty, based on a present-state examination (unable to reach the patient's mother despite multiple phone call attempts), the patient does not currently meet PEC criteria due to not being an imminent threat to self/others and not being gravely disabled 2/2 mental illness at this time.       - With reasonable medical certainty, based on a present state examination, the patient currently appears to have medical decision-making capacity as made evident by his ability to cognitively utilize information provided to him to express a choice that is stable over time, to understand the relevant information pertaining  "his diagnoses and recommended treatments, to appreciate the consequences of the decision (risks vs benefits) regarding accepting vs refusing treatment, and to manipulate all of the data in a logical fashion.     - Despite education, counseling, and motivational interviewing, the patient denies any desire to restart any psychiatric medications at this time (discussed risks/benefits/alt vs no treatment with patient).    - Defer non-psychiatric medications / management to the Primary Hospital Medicine Team.      - Informed the patient of the risks of continuous Benzodiazepine use including but not limited to falls, CNS depression, respiratory depression, reduced cognition, tolerance, dependence and withdrawals that may be life threatening upon abrupt cessation. Also advised not to take Benzodiazepines with Opiates or other sedatives and also not to drive or operate heavy machinery while using Benzodiazepines.     - Psychotherapy was performed with patient as noted above with a focus on improving polysubstance cessation / total sobriety and medication compliance.    - Despite education, counseling, and motivational interviewing, the patient denies any desire for residential drug rehab, IOP treatment, MAT, 12 steps meetings, or outpatient MH / Addiction treatment.  I discussed a packet of resources with the patient and placed this packet into his discharge instructions tab to be printed and given to the patient prior to discharge from this facility please.       - Educated the patient on the use of Narcan for opiate overdoses ; Primary Hospital Medicine Team to provide patient with a Narcan prescription at discharge.       - Patient's most recent resulted labs, imaging, and EKG were reviewed today ; UDS positive for cocaine, opiates, and benzodiazepines ; Ethanol < 10 ; CT Head with "No acute intracranial abnormality."    - Please have Hospital CM/SW assist patient with mental health & addiction f/u for s/p discharge " from this facility (see above).        - Patient was instructed to call 911 and/or [phone] and return to the nearest ED if he begins feeling suicidal, homicidal, or gravely disabled (for s/p this hospitalization).       - Thank you for this consult ; will continue to follow patient while at [facility]         Total time spent with patient and/or managing/coordinating patient's care today (excluding the time spent on psychotherapy): 78 minutes   Time spent on psychotherapy today (as noted above): 20 minutes   Total time for encounter today including psychotherapy: 98 minutes      More than 50% of the time was spent counseling/coordinating care.     Consulting clinician was informed of the encounter and consult note.       STAFF:  Nik Brasher MD  [facility]   6/3/2024

## 2024-06-03 NOTE — PROGRESS NOTES
Pharmacokinetic Assessment Follow Up: IV Vancomycin    Vancomycin serum concentration assessment(s):    The random level was drawn correctly and can be used to guide therapy at this time. The measurement is below the desired definitive target range of 15 to 20 mcg/mL.    Vancomycin Regimen Plan:    Give IV Vanc 1500 mg x 1 dose.   Re-dose when the random level is less than 20 mcg/mL, next level to be drawn at 2100 on 6/3    Drug levels (last 3 results):  Recent Labs   Lab Result Units 06/03/24  0110   Vancomycin, Random ug/mL 4.7       Pharmacy will continue to follow and monitor vancomycin.    Please contact pharmacy at extension [phone] for questions regarding this assessment.    Thank you for the consult,   Lori Long       Patient brief summary:  Nick Moffett Jr is a 44 y.o. male initiated on antimicrobial therapy with IV Vancomycin for treatment of sepsis    The patient's current regimen is pulse dosing, FREDERICK     Drug Allergies:   Review of patient's allergies indicates:  No Known Allergies    Actual Body Weight:   93 kg    Renal Function:   Estimated Creatinine Clearance: 57.5 mL/min (A) (based on SCr of 1.8 mg/dL (H)).,     Dialysis Method (if applicable):  N/A    CBC (last 72 hours):  Recent Labs   Lab Result Units 06/01/24  1909 06/02/24  0017   WBC K/uL 37.98* 19.01*   Hemoglobin g/dL 15.0 12.6*   Hematocrit % 47.1 39.6*   Platelets K/uL 466* 320   Gran % % 70.0 79.7*   Lymph % % 11.0* 10.3*   Mono % % 6.0 8.5   Eosinophil % % 0.0 0.1   Basophil % % 0.0 0.3   Differential Method  Manual Automated       Metabolic Panel (last 72 hours):  Recent Labs   Lab Result Units 06/01/24  1909 06/02/24  0017   Sodium mmol/L 137 144   Potassium mmol/L 6.0* 4.4   Chloride mmol/L 101 105   CO2 mmol/L 15* 18*   Glucose mg/dL 289* 116*   Glucose, UA  2+*  --    BUN mg/dL 13 14   Creatinine mg/dL 1.8* 1.8*   Creatinine, Urine mg/dL 68.1  --    Albumin g/dL 4.6 3.9   Total Bilirubin mg/dL 0.2 0.4   Alkaline Phosphatase  U/L 112 85   AST U/L 36 30   ALT U/L 29 22   Magnesium mg/dL 2.4  --    Phosphorus mg/dL 9.3*  --        Vancomycin Administrations:  vancomycin given in the last 96 hours                     vancomycin (VANCOCIN) 1,750 mg in dextrose 5 % (D5W) 500 mL IVPB (mg) 1,750 mg New Bag 06/02/24 0056                    Microbiologic Results:  Microbiology Results (last 7 days)       Procedure Component Value Units Date/Time    Blood culture [[id number]] Collected: 06/02/24 0350    Order Status: Completed Specimen: Blood Updated: 06/02/24 2145     Blood Culture, Routine No Growth to date    Blood culture [[id number]] Collected: 06/02/24 0350    Order Status: Completed Specimen: Blood Updated: 06/02/24 2145     Blood Culture, Routine No Growth to date

## 2024-06-04 ENCOUNTER — CLINICAL SUPPORT (OUTPATIENT)
Dept: SMOKING CESSATION | Facility: CLINIC | Age: 44
End: 2024-06-04

## 2024-06-04 VITALS
OXYGEN SATURATION: 97 % | DIASTOLIC BLOOD PRESSURE: 89 MMHG | HEART RATE: 90 BPM | TEMPERATURE: 99 F | BODY MASS INDEX: 28.37 KG/M2 | RESPIRATION RATE: 18 BRPM | WEIGHT: 209.44 LBS | SYSTOLIC BLOOD PRESSURE: 161 MMHG | HEIGHT: 72 IN

## 2024-06-04 DIAGNOSIS — F17.210 CIGARETTE SMOKER: Primary | ICD-10-CM

## 2024-06-04 PROBLEM — F17.200 TOBACCO DEPENDENCY: Status: ACTIVE | Noted: 2024-06-04

## 2024-06-04 LAB
ALBUMIN SERPL BCP-MCNC: 3.6 G/DL (ref 3.5–5.2)
ALP SERPL-CCNC: 74 U/L (ref 55–135)
ALT SERPL W/O P-5'-P-CCNC: 16 U/L (ref 10–44)
ANION GAP SERPL CALC-SCNC: 11 MMOL/L (ref 8–16)
AST SERPL-CCNC: 23 U/L (ref 10–40)
BASOPHILS # BLD AUTO: 0.05 K/UL (ref 0–0.2)
BASOPHILS NFR BLD: 0.6 % (ref 0–1.9)
BILIRUB SERPL-MCNC: 0.4 MG/DL (ref 0.1–1)
BUN SERPL-MCNC: 12 MG/DL (ref 6–20)
CALCIUM SERPL-MCNC: 9.4 MG/DL (ref 8.7–10.5)
CHLORIDE SERPL-SCNC: 101 MMOL/L (ref 95–110)
CO2 SERPL-SCNC: 26 MMOL/L (ref 23–29)
CREAT SERPL-MCNC: 1.2 MG/DL (ref 0.5–1.4)
DIFFERENTIAL METHOD BLD: ABNORMAL
EOSINOPHIL # BLD AUTO: 0.2 K/UL (ref 0–0.5)
EOSINOPHIL NFR BLD: 2.1 % (ref 0–8)
ERYTHROCYTE [DISTWIDTH] IN BLOOD BY AUTOMATED COUNT: 12.6 % (ref 11.5–14.5)
EST. GFR  (NO RACE VARIABLE): >60 ML/MIN/1.73 M^2
GLUCOSE SERPL-MCNC: 111 MG/DL (ref 70–110)
HCT VFR BLD AUTO: 38.6 % (ref 40–54)
HGB BLD-MCNC: 13 G/DL (ref 14–18)
IMM GRANULOCYTES # BLD AUTO: 0.03 K/UL (ref 0–0.04)
IMM GRANULOCYTES NFR BLD AUTO: 0.4 % (ref 0–0.5)
LYMPHOCYTES # BLD AUTO: 0.7 K/UL (ref 1–4.8)
LYMPHOCYTES NFR BLD: 8.1 % (ref 18–48)
MCH RBC QN AUTO: 28.4 PG (ref 27–31)
MCHC RBC AUTO-ENTMCNC: 33.7 G/DL (ref 32–36)
MCV RBC AUTO: 84 FL (ref 82–98)
MONOCYTES # BLD AUTO: 0.8 K/UL (ref 0.3–1)
MONOCYTES NFR BLD: 9 % (ref 4–15)
NEUTROPHILS # BLD AUTO: 6.8 K/UL (ref 1.8–7.7)
NEUTROPHILS NFR BLD: 79.8 % (ref 38–73)
NRBC BLD-RTO: 0 /100 WBC
PLATELET # BLD AUTO: 269 K/UL (ref 150–450)
PMV BLD AUTO: 8.9 FL (ref 9.2–12.9)
POCT GLUCOSE: 102 MG/DL (ref 70–110)
POCT GLUCOSE: 114 MG/DL (ref 70–110)
POCT GLUCOSE: 117 MG/DL (ref 70–110)
POTASSIUM SERPL-SCNC: 3.8 MMOL/L (ref 3.5–5.1)
PROT SERPL-MCNC: 7 G/DL (ref 6–8.4)
RBC # BLD AUTO: 4.58 M/UL (ref 4.6–6.2)
SODIUM SERPL-SCNC: 138 MMOL/L (ref 136–145)
WBC # BLD AUTO: 8.51 K/UL (ref 3.9–12.7)

## 2024-06-04 PROCEDURE — 36415 COLL VENOUS BLD VENIPUNCTURE: CPT | Performed by: INTERNAL MEDICINE

## 2024-06-04 PROCEDURE — 80053 COMPREHEN METABOLIC PANEL: CPT | Performed by: INTERNAL MEDICINE

## 2024-06-04 PROCEDURE — 25000003 PHARM REV CODE 250: Performed by: STUDENT IN AN ORGANIZED HEALTH CARE EDUCATION/TRAINING PROGRAM

## 2024-06-04 PROCEDURE — S4991 NICOTINE PATCH NONLEGEND: HCPCS | Performed by: STUDENT IN AN ORGANIZED HEALTH CARE EDUCATION/TRAINING PROGRAM

## 2024-06-04 PROCEDURE — 25000003 PHARM REV CODE 250: Performed by: INTERNAL MEDICINE

## 2024-06-04 PROCEDURE — 63600175 PHARM REV CODE 636 W HCPCS: Performed by: INTERNAL MEDICINE

## 2024-06-04 PROCEDURE — 87040 BLOOD CULTURE FOR BACTERIA: CPT | Mod: 59 | Performed by: STUDENT IN AN ORGANIZED HEALTH CARE EDUCATION/TRAINING PROGRAM

## 2024-06-04 PROCEDURE — 99406 BEHAV CHNG SMOKING 3-10 MIN: CPT | Mod: S$GLB,,,

## 2024-06-04 PROCEDURE — 99233 SBSQ HOSP IP/OBS HIGH 50: CPT | Mod: AF,HB,, | Performed by: PSYCHIATRY & NEUROLOGY

## 2024-06-04 PROCEDURE — 85025 COMPLETE CBC W/AUTO DIFF WBC: CPT | Performed by: INTERNAL MEDICINE

## 2024-06-04 PROCEDURE — 90833 PSYTX W PT W E/M 30 MIN: CPT | Mod: AF,HB,, | Performed by: PSYCHIATRY & NEUROLOGY

## 2024-06-04 PROCEDURE — 63700000 PHARM REV CODE 250 ALT 637 W/O HCPCS: Performed by: INTERNAL MEDICINE

## 2024-06-04 RX ORDER — IBUPROFEN 200 MG
1 TABLET ORAL DAILY
Qty: 30 PATCH | Refills: 0 | Status: SHIPPED | OUTPATIENT
Start: 2024-06-05

## 2024-06-04 RX ORDER — LEVOFLOXACIN 750 MG/1
750 TABLET ORAL DAILY
Qty: 3 TABLET | Refills: 0 | Status: SHIPPED | OUTPATIENT
Start: 2024-06-04

## 2024-06-04 RX ORDER — IBUPROFEN 200 MG
1 TABLET ORAL DAILY
Status: DISCONTINUED | OUTPATIENT
Start: 2024-06-04 | End: 2024-06-04 | Stop reason: HOSPADM

## 2024-06-04 RX ADMIN — MUPIROCIN: 20 OINTMENT TOPICAL at 08:06

## 2024-06-04 RX ADMIN — AZITHROMYCIN DIHYDRATE 500 MG: 250 TABLET ORAL at 08:06

## 2024-06-04 RX ADMIN — HYDRALAZINE HYDROCHLORIDE 25 MG: 25 TABLET ORAL at 08:06

## 2024-06-04 RX ADMIN — NICOTINE 1 PATCH: 21 PATCH, EXTENDED RELEASE TRANSDERMAL at 11:06

## 2024-06-04 RX ADMIN — CHLORHEXIDINE GLUCONATE 0.12% ORAL RINSE 15 ML: 1.2 LIQUID ORAL at 08:06

## 2024-06-04 NOTE — SUBJECTIVE & OBJECTIVE
Interval History: Alert and feeling better. Does not feel like he needs to see a psychiatrist and says he was on clonidine in the past but didn't feel it was helpful    Review of Systems   Constitutional:  Negative for chills and fever.   Respiratory:  Negative for shortness of breath.    Gastrointestinal:  Negative for nausea and vomiting.   Skin:  Negative for wound.   Neurological:  Negative for dizziness and speech difficulty.   Psychiatric/Behavioral:  Negative for decreased concentration and hallucinations.      Objective:     Vital Signs (Most Recent):  Temp: 98.6 °F (37 °C) (06/03/24 1732)  Pulse: 82 (06/03/24 1732)  Resp: 18 (06/03/24 1732)  BP: (!) 162/83 (06/03/24 1732)  SpO2: 95 % (06/03/24 1732) Vital Signs (24h Range):  Temp:  [98.6 °F (37 °C)-100.3 °F (37.9 °C)] 98.6 °F (37 °C)  Pulse:  [58-88] 82  Resp:  [3-61] 18  SpO2:  [92 %-100 %] 95 %  BP: (120-162)/(57-83) 162/83     Weight: 95 kg (209 lb 7 oz)  Body mass index is 28.4 kg/m².    Intake/Output Summary (Last 24 hours) at 6/3/2024 1938  Last data filed at 6/3/2024 1700  Gross per 24 hour   Intake 340 ml   Output 2100 ml   Net -1760 ml         Physical Exam  Constitutional:       General: He is not in acute distress.     Appearance: Normal appearance.   HENT:      Head: Normocephalic and atraumatic.   Cardiovascular:      Rate and Rhythm: Normal rate and regular rhythm.   Pulmonary:      Effort: Pulmonary effort is normal. No respiratory distress.      Breath sounds: Normal breath sounds.   Abdominal:      General: Abdomen is flat. Bowel sounds are normal. There is no distension.      Palpations: Abdomen is soft.   Skin:     General: Skin is warm and dry.   Neurological:      General: No focal deficit present.      Mental Status: He is alert and oriented to person, place, and time.   Psychiatric:         Mood and Affect: Mood normal.         Behavior: Behavior normal.             Significant Labs: All pertinent labs within the past 24 hours have  been reviewed.  Blood Culture:   Recent Labs   Lab 06/01/24 2035 06/01/24 2039 06/02/24  0350   LABBLOO No Growth to date  No Growth to date No Growth to date  No Growth to date No Growth to date  No Growth to date  No Growth to date  No Growth to date     CBC:   Recent Labs   Lab 06/02/24 0017   WBC 19.01*   HGB 12.6*   HCT 39.6*        CMP:   Recent Labs   Lab 06/02/24 0017 06/03/24  1712     --    K 4.4  --      --    CO2 18*  --    *  --    BUN 14  --    CREATININE 1.8* 1.3   CALCIUM 9.2  --    PROT 7.2  --    ALBUMIN 3.9  --    BILITOT 0.4  --    ALKPHOS 85  --    AST 30  --    ALT 22  --    ANIONGAP 21*  --        Significant Imaging: I have reviewed all pertinent imaging results/findings within the past 24 hours.

## 2024-06-04 NOTE — PROGRESS NOTES
Smoking cessation education note: Pt is a 1 pk/day cigarette smoker x 28 yrs. Order obtained for 21 mg nicotine patch Q day. Pt states ready to quit smoking. Ambulatory referral to Smoking Cessation clinic following hospital discharge.

## 2024-06-04 NOTE — NURSING
Pt had a temp of 101.3 with elevated bp. Hospitalist was notified of this matter. New orders in place. Will continue with care.

## 2024-06-04 NOTE — PROGRESS NOTES
"PSYCHIATRY INPATIENT PROGRESS NOTE  SUBSEQUENT HOSPITAL VISIT      6/4/2024 10:30 AM   Nick Moffett Jr   1980   [id number]           DATE OF ADMISSION: 6/1/2024 11:46 PM    SITE: [facility]    CURRENT LEGAL STATUS: Patient does not currently meet PEC criteria due to not currently being an imminent threat to self/others and not being gravely disabled 2/2 mental illness at this time.     HISTORY    Per Initial History from Primary Team:  "Patient is a 44-year-old male with past medical history significant for hepatitis, previous drug overdose, hypertension and polysubstance abuse who is being brought in by ambulance after being found unresponsive at home by his mother.  He was down for an unknown amount of time and had been talking to his girlfriend earlier in the night.  At approximately 7:00 p.m., he disconnected and his girlfriend believes that he had gone to bed.  His mother came home and found him in his bed completely unresponsive to stimuli.  There was some occasional generalized posturing of unclear significance but has no history of seizures.  No reported bowel or bladder incontinence.  His GCS on arrival was 3 and he was subsequently intubated with RSI using etomidate and succinylcholine.  He received 5 mg of IV midazolam in route for suspected seizure.  His vital signs on admission were heart rate of 128, respiratory rate of 20, blood pressure 167/111, afebrile.  He was on oxygen on arrival 100% via Ventimask.  He was intubated and placed on ACVC with tidal volumes of 450, respiratory rate of 20, peep of 6.1.  Given a previous presentation for drug overdose with opiates, he was empirically given naloxone. He was breathing over the vent and labs were collected.  His initial ABG showed a pH of 6.986, pCO2 of 81, PO2 of 250, bicarbonate of 19.4 saturating 99% on 100% FiO2.  A CBC showed a leukocytosis of 37.98, hemoglobin 15.0, platelets of 466.  A metabolic panel showed sodium of 137, potassium 6.0 " "(shifted with albuterol), chloride of 101, CO2 of 15 with a BUN and creatinine of 13 and 1.8 respectively which is a proximally double his baseline of 0.9-1.0, glucose was 289, phosphorus was 9.3, magnesium 2.4, T bili is 0.2, AST and ALT are 36 and 29 respectively.  Ammonia was 103, initial lactate was greater than 12.  Troponin and CPK within tolerable limits but slightly elevated CPK at 462 indicating possible developing nontraumatic rhabdo.  Drug screen was positive for benzos, cocaine and opiates.  Previous historic results of also been positive for fentanyl back in February.  He is being transferred for ICU level care as well as neurology evaluation.  May require an MRI if neurological results do not improve.  Additionally may have progression of end-organ damage in the setting of prolong hypopnea given his initial ABG."  On arrival, pt is arousable to voice.   Per CM Today (06/04/2024):   went to meet with patient this morning. Patient is a possible discharge today. No anticipated discharge needs. PCP follow-up scheduled. I did speak with PCP office and they do have a Behavioral Health Specialist and addiction program at their clinic that his PCP can refer him too. Patient reports to me he no longer follows with any Psychiatrist. Patient confirms he has a ride home at discharge. Patient encouraged to call with any questions or concerns.  will continue to follow patient through transitions of care and assist with any discharge needs.        Chief Complaint / Reason for Original Psychiatry Consult: H/o schizophrenia, recently had multiple drug overdoses, s/p extubation for cocaine OD        Subjective / Interval Psychiatric History Today (06/04/2024) (with Psychiatric ROS below):  Nick Moffett  is a 44 y.o. male with a past medical history as noted above/below and a past psychiatric history of Schizophrenia, SIPD, SIMD, and Polysubstance Abuse / Dependence (cocaine, crystal meth, " opiates, and possibly benzodiazepines), currently being treated by his inpatient primary team for a principle problem of AMS / unintentional overdose.  Psychiatry was originally consulted as noted above.  The patient was seen and examined again today.  The chart was reviewed again today.  On examination today, the patient was again alert and oriented to person, place, city, state, month, year, and situation.  He was again CAM-ICU negative for delirium.  He again denies any current or prior active or passive SI / HI.  He again adamantly denies that this overdose was any type of intentional self-harm behavior.  As noted yesterday, despite education, counseling, and motivational interviewing, the patient denied any desire for residential drug rehab, IOP treatment, MAT, 12 steps meetings, or outpatient MH / Addiction treatment, again stating that he thought he could achieve total sobriety on his own.  He again denies any current or recent s/s consistent with psychosis, tiffany, depression, anxiety, OCD, PTSD, or eating disorders.  Objectively, on examination, he again did not exhibit any signs of psychosis, tiffany, depression, or anxiety.  His nursing team and the primary medicine MD again confirmed that he had not exhibited any signs of psychosis, tiffany, depression, or anxiety on their exams as well.  He again denies any current or prior passive/active SI/HI.  He denies any adverse effects to his current medication regimen in the hospital.  Regarding current medical/physical complaints, he endorses improving fatigue.  He denies any other medical complaints at this time.  NAD was observed during the examination.  Psychotherapy was again implemented as noted below with a focus on improving polysubstance cessation / total sobriety and medication compliance.  See detailed psych ROS below.  See A/P below.         Psychiatric Review Of Systems - Currently, the patient is endorsing and/or denying the following:  (patient's  endorsements are BOLDED below; if not BOLDED, then patient denied):     Denies Symptoms of Depression: diminished mood or loss of interest/anhedonia; irritability, diminished energy, change in sleep, change in appetite, diminished concentration or cognition or indecisiveness, PMA/R, excessive guilt or hopelessness or worthlessness, suicidal ideations     Denies issues with Sleep: initiation, maintenance, early morning awakening with inability to return to sleep     Denies Suicidal/Homicidal ideations: active/passive ideations, organized plans, future intentions     Denies Symptoms of psychosis: hallucinations, delusions, disorganized thinking, disorganized behavior or abnormal motor behavior, or negative symptoms (diminshed emotional expression, avolition, anhedonia, alogia, asociality)      Denies Symptoms of tiffany or hypomania: elevated, expansive, or irritable mood with increased energy or activity; with inflated self-esteem or grandiosity, decreased need for sleep, increased rate of speech, FOI or racing thoughts, distractibility, increased goal directed activity or PMA, risky/disinhibited behavior     Denies Symptoms of JC: excessive anxiety/worry/fear, more days than not, about numerous issues, difficult to control, with restlessness, fatigue, poor concentration, irritability, muscle tension, sleep disturbance; causes functionally impairing distress      Denies Symptoms of Panic Disorder: recurrent panic attacks, precipitated or un-precipitated, source of worry and/or behavioral changes secondary; with or without agoraphobia     Denies Symptoms of PTSD: h/o trauma; re-experiencing/intrusive symptoms, avoidant behavior, negative alterations in cognition or mood, or hyperarousal symptoms; with or without dissociative symptoms      Denies Symptoms of OCD: obsessions or compulsions      Denies Symptoms of Eating Disorders: anorexia, bulimia or binging     Endorses Substance Use (stimulants (cocaine and crystal  meth) and opiates): intoxication, withdrawal, tolerance, used in larger amounts or duration than intended, unsuccessful attempts to limit or quit, increased time engaging in or seeking out, cravings or strong desire to use, failure to fulfill obligations, negative consequences in social/interpersonal/occupational,/recreational areas, use in dangerous situations, medical or psychological consequences         [facility] Toolkit ASQ Suicide Screening Tool:  In the past few weeks, have you wished you were dead? Denies   In the past few weeks, have you felt that you or your family would be better off if you were dead? Denies  In the past week, have you been having thoughts about killing yourself? Denies  Have you ever tried to kill yourself? Denies  Are you having thoughts of killing yourself right now? Denies         PSYCHOTHERAPY ADD-ON [id number]   30 (16-37*) minutes     Time: 21 minutes  Participants: Met with patient     Therapeutic Intervention Type: behavior modifying psychotherapy, supportive psychotherapy  Why chosen therapy is appropriate versus another modality: relevant to diagnosis, patient responds to this modality, evidence based practice     Target symptoms: polysubstance abuse / dependence (cocaine, crystal meth, and opiates) and hx of medication noncompliance   Primary focus: improving polysubstance cessation / total sobriety and medication compliance   Psychotherapeutic techniques: supportive and psychodynamic techniques; psycho-education; reality / insight orientation; motivational interviewing; behavioral modification; CBT; problem solving techniques and managing life stressors     Outcome monitoring methods: self-report, observation     Patient's response to intervention:  The patient's response to intervention is accepting / limited.       Progress toward goals:  The patient's progress toward goals is fair / limited.        ROS:  General ROS: negative for - chills, fever or night sweats; positive for  improving fatigue  Ophthalmic ROS: negative for - blurry vision, double vision or eye pain  ENT ROS: negative for - sinus pain, headaches, sore throat or visual changes  Allergy and Immunology ROS: negative for - hives, itchy/watery eyes or nasal congestion  Hematological and Lymphatic ROS: negative for - bleeding problems, bruising, jaundice or pallor  Endocrine ROS: negative for - galactorrhea, hot flashes, mood swings, palpitations or temperature intolerance  Respiratory ROS: negative for - cough, hemoptysis, shortness of breath, tachypnea or wheezing  Cardiovascular ROS: negative for - chest pain, dyspnea on exertion, loss of consciousness, palpitations, rapid heart rate or shortness of breath  Gastrointestinal ROS: negative for - appetite loss, nausea, abdominal pain, blood in stools, change in bowel habits, constipation or diarrhea  Genito-Urinary ROS: negative for - incontinence, nocturia or pelvic pain  Musculoskeletal ROS: negative for - joint stiffness, joint swelling, joint pain or muscle pain   Neurological ROS: negative for - behavioral changes, confusion, dizziness, memory loss, numbness/tingling or seizures  Dermatological ROS: negative for dry skin, hair changes, pruritus or rash  Psychiatric ROS: see detailed psychiatric ROS above in subjective section      PAST MEDICAL & SURGICAL HISTORY   Past Medical History:   Diagnosis Date    Hypertension      Past Surgical History:   Procedure Laterality Date    APPENDECTOMY      difficult airway       NEUROLOGIC HISTORY  Seizures: denies    Head trauma with LOC: denies   CVA: denies     FAMILY HISTORY   No family history on file.    ALLERGIES   Review of patient's allergies indicates:  No Known Allergies    CURRENT MEDICATION REGIMEN   Home Meds:   Prior to Admission medications    Medication Sig Start Date End Date Taking? Authorizing Provider   cloNIDine (CATAPRES) 0.1 MG tablet Take 0.1 mg by mouth once daily. 1/25/24   Provider, Historical   levoFLOXacin  (LEVAQUIN) 750 MG tablet Take 1 tablet (750 mg total) by mouth once daily. 6/4/24   Jovi Norton MD   naloxone (NARCAN) 4 mg/actuation Spry 1 spray (4 mg total) by Nasal route once. for 1 dose 6/3/24 6/5/24  Dona Polk MD   naloxone (NARCAN) 4 mg/actuation Spry 4mg by nasal route as needed for opioid overdose; may repeat every 2-3 minutes in alternating nostrils until medical help arrives. Call 911 6/3/24   Dona Polk MD   nicotine (NICODERM CQ) 21 mg/24 hr Place 1 patch onto the skin once daily. 6/5/24   Jovi Norton MD       OTC Meds: denies      Scheduled Meds:    azithromycin  500 mg Oral Daily    cefTRIAXone (Rocephin) IV (PEDS and ADULTS)  2 g Intravenous Q24H    chlorhexidine  15 mL Mouth/Throat BID    cloNIDine  0.1 mg Oral Once    enoxparin  40 mg Subcutaneous Q24H (prophylaxis, 1700)    hydrALAZINE  25 mg Oral Q12H    mupirocin   Nasal BID    nicotine  1 patch Transdermal Daily      PRN Meds:   Current Facility-Administered Medications:     acetaminophen, 650 mg, Oral, Q6H PRN    glucagon (human recombinant), 1 mg, Intramuscular, PRN    glucose, 16 g, Oral, PRN    glucose, 24 g, Oral, PRN    sodium chloride 0.9%, 10 mL, Intravenous, PRN   Psychotherapeutics (From admission, onward)      None            LABORATORY DATA   Recent Results (from the past 72 hour(s))   EKG 12-lead    Collection Time: 06/01/24  6:56 PM   Result Value Ref Range    QRS Duration 94 ms    [facility] QTC Calculation 442 ms   ISTAT PROCEDURE    Collection Time: 06/01/24  7:01 PM   Result Value Ref Range    POC PH 6.986 (LL) 7.35 - 7.45    POC PCO2 81.5 (HH) 35 - 45 mmHg    POC PO2 253 (H) 80 - 100 mmHg    POC HCO3 19.4 (L) 24 - 28 mmol/L    POC BE -12 (L) -2 to 2 mmol/L    POC SATURATED O2 99 95 - 100 %    POC TCO2 22 (L) 23 - 27 mmol/L    Rate 20     Sample ARTERIAL     Site LR     Allens Test Pass     DelSys Adult Vent     Mode AC/PRVC     Vt 450     PEEP 5     FiO2 100     Sp02 100    Acetaminophen Level     Collection Time: 06/01/24  7:09 PM   Result Value Ref Range    Acetaminophen (Tylenol), Serum <3.0 (L) 10.0 - 20.0 ug/mL   Ammonia    Collection Time: 06/01/24  7:09 PM   Result Value Ref Range    Ammonia 103 (H) 10 - 50 umol/L   CBC Auto Differential    Collection Time: 06/01/24  7:09 PM   Result Value Ref Range    WBC 37.98 (H) 3.90 - 12.70 K/uL    RBC 5.40 4.60 - 6.20 M/uL    Hemoglobin 15.0 14.0 - 18.0 g/dL    Hematocrit 47.1 40.0 - 54.0 %    MCV 87 82 - 98 fL    MCH 27.8 27.0 - 31.0 pg    MCHC 31.8 (L) 32.0 - 36.0 g/dL    RDW 13.0 11.5 - 14.5 %    Platelets 466 (H) 150 - 450 K/uL    MPV 8.9 (L) 9.2 - 12.9 fL    Immature Granulocytes CANCELED 0.0 - 0.5 %    Immature Grans (Abs) CANCELED 0.00 - 0.04 K/uL    Lymph # CANCELED 1.0 - 4.8 K/uL    Mono # CANCELED 0.3 - 1.0 K/uL    Eos # CANCELED 0.0 - 0.5 K/uL    Baso # CANCELED 0.00 - 0.20 K/uL    nRBC 0 0 /100 WBC    Gran % 70.0 38.0 - 73.0 %    Lymph % 11.0 (L) 18.0 - 48.0 %    Mono % 6.0 4.0 - 15.0 %    Eosinophil % 0.0 0.0 - 8.0 %    Basophil % 0.0 0.0 - 1.9 %    Bands 13.0 %    Differential Method Manual    CK    Collection Time: 06/01/24  7:09 PM   Result Value Ref Range     (H) 20 - 200 U/L   Comprehensive Metabolic Panel    Collection Time: 06/01/24  7:09 PM   Result Value Ref Range    Sodium 137 136 - 145 mmol/L    Potassium 6.0 (H) 3.5 - 5.1 mmol/L    Chloride 101 95 - 110 mmol/L    CO2 15 (L) 23 - 29 mmol/L    Glucose 289 (H) 70 - 110 mg/dL    BUN 13 6 - 20 mg/dL    Creatinine 1.8 (H) 0.5 - 1.4 mg/dL    Calcium 9.2 8.7 - 10.5 mg/dL    Total Protein 8.9 (H) 6.0 - 8.4 g/dL    Albumin 4.6 3.5 - 5.2 g/dL    Total Bilirubin 0.2 0.1 - 1.0 mg/dL    Alkaline Phosphatase 112 55 - 135 U/L    AST 36 10 - 40 U/L    ALT 29 10 - 44 U/L    eGFR 47.0 (A) >60 mL/min/1.73 m^2    Anion Gap 21 (H) 8 - 16 mmol/L   Drug screen panel, in-house    Collection Time: 06/01/24  7:09 PM   Result Value Ref Range    Benzodiazepines Presumptive Positive (A) Negative    Methadone  metabolites Negative Negative    Cocaine (Metab.) Presumptive Positive (A) Negative    Opiate Scrn, Ur Presumptive Positive (A) Negative    Barbiturate Screen, Ur Negative Negative    Amphetamine Screen, Ur Negative Negative    THC Negative Negative    Phencyclidine Negative Negative    Creatinine, Urine 68.1 23.0 - 375.0 mg/dL    Toxicology Information SEE COMMENT    Ethanol    Collection Time: 06/01/24  7:09 PM   Result Value Ref Range    Alcohol, Serum <10 <10 mg/dL   Magnesium    Collection Time: 06/01/24  7:09 PM   Result Value Ref Range    Magnesium 2.4 1.6 - 2.6 mg/dL   Phosphorus    Collection Time: 06/01/24  7:09 PM   Result Value Ref Range    Phosphorus 9.3 (HH) 2.7 - 4.5 mg/dL   Salicylate Level    Collection Time: 06/01/24  7:09 PM   Result Value Ref Range    Salicylate Lvl <5.0 (L) 15.0 - 30.0 mg/dL   Troponin I    Collection Time: 06/01/24  7:09 PM   Result Value Ref Range    Troponin I 0.025 0.000 - 0.026 ng/mL   Urinalysis, Reflex to Urine Culture Urine, Catheterized    Collection Time: 06/01/24  7:09 PM    Specimen: Urine   Result Value Ref Range    Specimen UA Urine, Catheterized     Color, UA Yellow Yellow, Straw, Jyoti    Appearance, UA Clear Clear    pH, UA 7.0 5.0 - 8.0    Specific Gravity, UA 1.025 1.005 - 1.030    Protein, UA 2+ (A) Negative    Glucose, UA 2+ (A) Negative    Ketones, UA Negative Negative    Bilirubin (UA) Negative Negative    Occult Blood UA 1+ (A) Negative    Nitrite, UA Negative Negative    Urobilinogen, UA Negative <2.0 EU/dL    Leukocytes, UA Negative Negative   Lactic Acid, Plasma    Collection Time: 06/01/24  7:09 PM   Result Value Ref Range    Lactate (Lactic Acid) >12.0 (HH) 0.5 - 2.2 mmol/L   Urinalysis Microscopic    Collection Time: 06/01/24  7:09 PM   Result Value Ref Range    RBC, UA 7 (H) 0 - 4 /hpf    WBC, UA 5 0 - 5 /hpf    Bacteria Occasional None-Occ /hpf    Squam Epithel, UA 3 /hpf    Hyaline Casts, UA 2 (A) 0-1/lpf /lpf    Amorphous, UA Few None-Moderate     Microscopic Comment SEE COMMENT    Blood Culture #2 **CANNOT BE ORDERED STAT**    Collection Time: 06/01/24  8:35 PM    Specimen: Wrist, Right; Blood   Result Value Ref Range    Blood Culture, Routine No Growth to date     Blood Culture, Routine No Growth to date     Blood Culture, Routine No Growth to date    ISTAT PROCEDURE    Collection Time: 06/01/24  8:37 PM   Result Value Ref Range    POC PH 7.306 (L) 7.35 - 7.45    POC PCO2 46.8 (H) 35 - 45 mmHg    POC PO2 209 (H) 80 - 100 mmHg    POC HCO3 23.4 (L) 24 - 28 mmol/L    POC BE -3 (L) -2 to 2 mmol/L    POC SATURATED O2 100 95 - 100 %    POC TCO2 25 23 - 27 mmol/L    Rate 20     Sample ARTERIAL     Site RR     Allens Test Pass     DelSys Adult Vent     Mode AC/PRVC     Vt 450     PEEP 5     FiO2 50     ETCO2 51     Min Vol 9.3     Sp02 99    Blood culture    Collection Time: 06/01/24  8:39 PM    Specimen: Peripheral, Antecubital, Right; Blood   Result Value Ref Range    Blood Culture, Routine No Growth to date     Blood Culture, Routine No Growth to date     Blood Culture, Routine No Growth to date    Basic metabolic panel    Collection Time: 06/02/24 12:17 AM   Result Value Ref Range    Sodium 144 136 - 145 mmol/L    Potassium 4.4 3.5 - 5.1 mmol/L    Chloride 105 95 - 110 mmol/L    CO2 18 (L) 23 - 29 mmol/L    Glucose 116 (H) 70 - 110 mg/dL    BUN 14 6 - 20 mg/dL    Creatinine 1.8 (H) 0.5 - 1.4 mg/dL    Calcium 9.2 8.7 - 10.5 mg/dL    Anion Gap 21 (H) 8 - 16 mmol/L    eGFR 47 (A) >60 mL/min/1.73 m^2   Hepatic function panel    Collection Time: 06/02/24 12:17 AM   Result Value Ref Range    Total Protein 7.2 6.0 - 8.4 g/dL    Albumin 3.9 3.5 - 5.2 g/dL    Total Bilirubin 0.4 0.1 - 1.0 mg/dL    Bilirubin, Direct 0.2 0.1 - 0.3 mg/dL    AST 30 10 - 40 U/L    ALT 22 10 - 44 U/L    Alkaline Phosphatase 85 55 - 135 U/L   CBC auto differential    Collection Time: 06/02/24 12:17 AM   Result Value Ref Range    WBC 19.01 (H) 3.90 - 12.70 K/uL    RBC 4.44 (L) 4.60 - 6.20  M/uL    Hemoglobin 12.6 (L) 14.0 - 18.0 g/dL    Hematocrit 39.6 (L) 40.0 - 54.0 %    MCV 89 82 - 98 fL    MCH 28.4 27.0 - 31.0 pg    MCHC 31.8 (L) 32.0 - 36.0 g/dL    RDW 13.2 11.5 - 14.5 %    Platelets 320 150 - 450 K/uL    MPV 9.7 9.2 - 12.9 fL    Immature Granulocytes 1.1 (H) 0.0 - 0.5 %    Gran # (ANC) 15.2 (H) 1.8 - 7.7 K/uL    Immature Grans (Abs) 0.20 (H) 0.00 - 0.04 K/uL    Lymph # 2.0 1.0 - 4.8 K/uL    Mono # 1.6 (H) 0.3 - 1.0 K/uL    Eos # 0.0 0.0 - 0.5 K/uL    Baso # 0.05 0.00 - 0.20 K/uL    nRBC 0 0 /100 WBC    Gran % 79.7 (H) 38.0 - 73.0 %    Lymph % 10.3 (L) 18.0 - 48.0 %    Mono % 8.5 4.0 - 15.0 %    Eosinophil % 0.1 0.0 - 8.0 %    Basophil % 0.3 0.0 - 1.9 %    Differential Method Automated    Lactic acid, plasma    Collection Time: 06/02/24 12:17 AM   Result Value Ref Range    Lactate (Lactic Acid) 7.6 (HH) 0.5 - 2.2 mmol/L   Troponin I    Collection Time: 06/02/24 12:17 AM   Result Value Ref Range    Troponin I 0.021 0.000 - 0.026 ng/mL   Blood culture    Collection Time: 06/02/24  3:50 AM    Specimen: Blood   Result Value Ref Range    Blood Culture, Routine No Growth to date     Blood Culture, Routine No Growth to date     Blood Culture, Routine No Growth to date    Blood culture    Collection Time: 06/02/24  3:50 AM    Specimen: Blood   Result Value Ref Range    Blood Culture, Routine No Growth to date     Blood Culture, Routine No Growth to date     Blood Culture, Routine No Growth to date    Lactic acid, plasma    Collection Time: 06/02/24 12:48 PM   Result Value Ref Range    Lactate (Lactic Acid) 1.1 0.5 - 2.2 mmol/L   POCT glucose    Collection Time: 06/02/24  4:41 PM   Result Value Ref Range    POCT Glucose 133 (H) 70 - 110 mg/dL   POCT glucose    Collection Time: 06/02/24  9:59 PM   Result Value Ref Range    POCT Glucose 149 (H) 70 - 110 mg/dL   Vancomycin, random    Collection Time: 06/03/24  1:10 AM   Result Value Ref Range    Vancomycin, Random 4.7 Not established ug/mL   POCT glucose     Collection Time: 06/03/24  6:35 AM   Result Value Ref Range    POCT Glucose 125 (H) 70 - 110 mg/dL   POCT glucose    Collection Time: 06/03/24  4:50 PM   Result Value Ref Range    POCT Glucose 114 (H) 70 - 110 mg/dL   Creatinine, Serum    Collection Time: 06/03/24  5:12 PM   Result Value Ref Range    Creatinine 1.3 0.5 - 1.4 mg/dL    eGFR >60 >60 mL/min/1.73 m^2   POCT glucose    Collection Time: 06/03/24  9:04 PM   Result Value Ref Range    POCT Glucose 115 (H) 70 - 110 mg/dL   Comprehensive metabolic panel    Collection Time: 06/04/24  3:40 AM   Result Value Ref Range    Sodium 138 136 - 145 mmol/L    Potassium 3.8 3.5 - 5.1 mmol/L    Chloride 101 95 - 110 mmol/L    CO2 26 23 - 29 mmol/L    Glucose 111 (H) 70 - 110 mg/dL    BUN 12 6 - 20 mg/dL    Creatinine 1.2 0.5 - 1.4 mg/dL    Calcium 9.4 8.7 - 10.5 mg/dL    Total Protein 7.0 6.0 - 8.4 g/dL    Albumin 3.6 3.5 - 5.2 g/dL    Total Bilirubin 0.4 0.1 - 1.0 mg/dL    Alkaline Phosphatase 74 55 - 135 U/L    AST 23 10 - 40 U/L    ALT 16 10 - 44 U/L    eGFR >60 >60 mL/min/1.73 m^2    Anion Gap 11 8 - 16 mmol/L   CBC auto differential    Collection Time: 06/04/24  3:40 AM   Result Value Ref Range    WBC 8.51 3.90 - 12.70 K/uL    RBC 4.58 (L) 4.60 - 6.20 M/uL    Hemoglobin 13.0 (L) 14.0 - 18.0 g/dL    Hematocrit 38.6 (L) 40.0 - 54.0 %    MCV 84 82 - 98 fL    MCH 28.4 27.0 - 31.0 pg    MCHC 33.7 32.0 - 36.0 g/dL    RDW 12.6 11.5 - 14.5 %    Platelets 269 150 - 450 K/uL    MPV 8.9 (L) 9.2 - 12.9 fL    Immature Granulocytes 0.4 0.0 - 0.5 %    Gran # (ANC) 6.8 1.8 - 7.7 K/uL    Immature Grans (Abs) 0.03 0.00 - 0.04 K/uL    Lymph # 0.7 (L) 1.0 - 4.8 K/uL    Mono # 0.8 0.3 - 1.0 K/uL    Eos # 0.2 0.0 - 0.5 K/uL    Baso # 0.05 0.00 - 0.20 K/uL    nRBC 0 0 /100 WBC    Gran % 79.8 (H) 38.0 - 73.0 %    Lymph % 8.1 (L) 18.0 - 48.0 %    Mono % 9.0 4.0 - 15.0 %    Eosinophil % 2.1 0.0 - 8.0 %    Basophil % 0.6 0.0 - 1.9 %    Differential Method Automated    POCT glucose    Collection  "Time: 06/04/24  6:00 AM   Result Value Ref Range    POCT Glucose 117 (H) 70 - 110 mg/dL   POCT glucose    Collection Time: 06/04/24 11:42 AM   Result Value Ref Range    POCT Glucose 102 70 - 110 mg/dL      No results found for: "PHENYTOIN", "PHENOBARB", "VALPROATE", "CBMZ"      EXAMINATION    VITALS   Vitals:    06/04/24 1144 06/04/24 1633 06/04/24 1634 06/04/24 1635   BP: 136/89 (!) 170/88 (!) 163/87 (!) 161/89   BP Location: Left arm Left arm     Patient Position: Lying Lying     Pulse: 78 90 88 90   Resp: 18 18     Temp: 98.6 °F (37 °C) 98.9 °F (37.2 °C)     TempSrc: Oral Oral     SpO2: 97% 95% 97%    Weight:       Height:            CONSTITUTIONAL  General Appearance: NAD, unremarkable, age appropriate, lying in bed, overweight, calm     MUSCULOSKELETAL  Muscle Strength and Tone: WNL    Abnormal Involuntary Movements: none observed   Gait and Station: WNL; non-ataxic      PSYCHIATRIC   Behavior/Cooperation:  friendly and cooperative, eye contact normal, calm  Speech:  normal tone, normal rate, normal pitch, normal volume  Language: grossly intact, able to name, able to repeat with spontaneous speech  Mood: "I'm straight ; pretty good"  Affect:  congruent with mood and full / reactive    Associations: intact; no CARLINE  Thought Process: Linear and Future-Oriented / Goal-Directed   Thought Content: denies SI, HI, AH, VH, TH, delusions, or paranoia (no RIS observed)   Sensorium: Awake  Alert and Oriented: to person, place, city, state, month, year, and situation   Memory: 3/3 immediate, 3/3 at 5 minutes               Recent: Limited / Intact; able to report intermittent recent events              Remote: Intact; Named 4/4 past presidents   Attention/concentration: Intact. Appropriate for age/education. Able to spell [id number]-[id number] & d-l-r-[id number].   Similarities: Intact (difference between apple and orange?)  Abstract reasoning: Intact  Fund of Knowledge: Named 4/4 past presidents   Insight: Limited / Intact  Judgment: Limited " / Intact     CAM ICU Delirium Assessment - NEGATIVE     Is the patient aware of the biomedical complications associated with substance abuse and mental illness? yes           MEDICAL DECISION MAKING     ASSESSMENT         Accidental / Unintentional Multiple Drug Overdose, Initial Encounter (cocaine, opiates, and benzodiazepines ; patient only attests to ingesting cocaine and opiates)  Opioid Use Disorder, Severe, Dependence   Cocaine Use Disorder, Severe, Dependence   Hx of Paranoid Schizophrenia (no current active s/s of psychosis / tiffany / depression on my exam today and per nursing staff and primary medicine MD)        RECOMMENDATIONS       - With reasonable medical certainty, based on a present-state examination today, the patient does not currently meet PEC criteria due to not being an imminent threat to self/others and not being gravely disabled 2/2 mental illness at this time.       - With reasonable medical certainty, based on a present state examination, the patient currently appears to have medical decision-making capacity as made evident by his ability to cognitively utilize information provided to him to express a choice that is stable over time, to understand the relevant information pertaining his diagnoses and recommended treatments, to appreciate the consequences of the decision (risks vs benefits) regarding accepting vs refusing treatment, and to manipulate all of the data in a logical fashion.      - Despite education, counseling, and motivational interviewing, the patient denies any desire to restart any psychiatric medications at this time (discussed risks/benefits/alt vs no treatment with patient).     - Defer non-psychiatric medications / management to the Primary Hospital Medicine Team.       - Informed the patient of the risks of continuous Benzodiazepine use including but not limited to falls, CNS depression, respiratory depression, reduced cognition, tolerance, dependence and withdrawals  "that may be life threatening upon abrupt cessation. Also advised not to take Benzodiazepines with Opiates or other sedatives and also not to drive or operate heavy machinery while using Benzodiazepines.      - Psychotherapy was again performed with patient as noted above with a focus on improving polysubstance cessation / total sobriety and medication compliance.     - Despite education, counseling, and motivational interviewing, the patient denies any desire for residential drug rehab, IOP treatment, MAT, 12 steps meetings, or outpatient MH / Addiction treatment.  I discussed a packet of resources with the patient and placed this packet into his discharge instructions tab to be printed and given to the patient prior to discharge from this facility please.        - Educated the patient on the use of Narcan for opiate overdoses ; Primary Hospital Medicine Team to provide patient with a Narcan prescription at discharge.       - Patient's most recent resulted labs, imaging, and EKG were reviewed today ; UDS positive for cocaine, opiates, and benzodiazepines ; Ethanol < 10 ; CT Head with "No acute intracranial abnormality."     - Please have Hospital CM/SW assist patient with mental health & addiction f/u for s/p discharge from this facility (see above).        - Patient was instructed to call 911 and/or [phone] and return to the nearest ED if he begins feeling suicidal, homicidal, or gravely disabled (for s/p this hospitalization).       - Thank you for this consult ; will continue to follow patient while at [facility]         Total time spent with patient and/or managing/coordinating patient's care today (excluding the time spent on psychotherapy): 58 minutes   Time spent on psychotherapy today (as noted above): 21 minutes   Total time for encounter today including psychotherapy: 79 minutes      More than 50% of the time was spent counseling/coordinating care.     Consulting clinician was informed of the encounter and " consult note.      STAFF:  Nik Brasher MD  [facility]  6/4/2024

## 2024-06-04 NOTE — PLAN OF CARE
Problem: Adult Inpatient Plan of Care  Goal: Plan of Care Review  Outcome: Progressing     Problem: Acute Kidney Injury/Impairment  Goal: Fluid and Electrolyte Balance  Outcome: Progressing

## 2024-06-04 NOTE — PLAN OF CARE
REFERRAL RECOMMENDATIONS FOR SUBSTANCE ABUSE & MENTAL HEALTH      IN CASE OF SUICIDAL THINKING, call the National Suicide Hotline Number: 988    988 Suicide & Crisis Lifeline: 986 , 4-554-466-MTLJ (7407)  https://988Human Factor Analytics.RIB Software       SUBSTANCE ABUSE:     [facility] (formerly known as the ABU)  [x] [phone], Option 2  [x] [address] [facility] 4[facility], [place] [address]  [x] [email or web address]://www.[email or web address].[email or web address]/services/[email or web address]-program  [x] The [facility] delivers comprehensive and collaborative treatment for alcohol and substance use disorders.  Excellent program for working professionals or anyone else seeking recovery.  [x] Requires insurance approval prior to starting program, call number above for more information.  [x] Intensive Outpatient Rehabilitation Program - M-F 9am-3pm - daily groups with psychologists and social workers, sessions with MDs 3x per week   [x] Ambulatory detox and dual diagnosis available      SUBOXONE:     NOTE: some Suboxone clinics require their clients to participate in a structured program (such as an IOP) in order to be prescribed Suboxone.  Some clinics have a long waiting list.  Most of these clinics do not accept walk-in clients, so call first to to learn what must be done to get started on Suboxone.    [facility] - [phone] (can do Sublocade)  [address] [facility], [place] [address]    [address]  [phone]    [facility] - [phone] (can do Sublocade)  [address] [place] [address]    [facility]  [address] [place]  [phone]     [facility] (very short waiting list, may accept some walk-in's but call first if possible)  [address], [place] [address]  [phone]; [phone]    [facility]   [address] [place]    [phone]    [facility] (can usually be seen within a week but is cash only  for appointment)  [address] [facility] ([facility])  [address] [place], [place]  [phone]    [facility] ([facility])  [address] [address]  [phone]    [place], [place]:    [facility] - [address] [place] [address] - Tel: [phone]    Shorty Elma - [address] [place] [address] - Tel: [phone]    Victor Manuel Prater - [address] [place] [place] [address] - Tel: [phone]    Jaison Mcintosh - [address] [place] [address] - Tel: [phone]    Charli Stephenson - [address] [place] [address] - Tel: [phone]    [place], [place]:     Dr. Laurence Mccormick and Dr. Ryder Max - [address] [place], [place] - Tel: [phone]    Dr. Maddy Kaiser - [address] [facility] [place], [place] - Tel: [phone]    Dr. Ever Marcelino - Tel: [phone]    Dr. Ankush Lee - [facility] - [phone]      METHADONE:     [facility] (the only methadone clinic in the [facility], has two locations)  [x] [place] - [address] [place] [address], [phone]  [x] [facility] [place] [place] [address], [phone]      12 STEP PROGRAMS (and similar):     Alcoholics Anonymous (local)  [x] [phone]  [x] www.[place].org for schedules for in-person and online meetings  [x] There are AA meetings throughout the day all over [facility]  [x] AA costs nothing to attend; they pass a basket for donations but this is not required    Narcotics Anonymous  [x] [phone]  [x] www.noana.org  [x] There are NA meetings throughout the day all over town  [x] NA costs nothing to attend; they pass a basket for donations but this is not required    Alcoholics Anonymous Online Intergroup (national)  [x] www.aa-intergroup.org  [x] Good resource for large, nation-wide meetings  [x] Can also attend smaller, local meetings in other cities  [x] Countless meetings all day and all night  [x] AA costs nothing to attend; they pass a basket for donations  but this is not required    Flying Sober - 24/7 zoom meetings for women and coed - sign on anytime, anywhere!  [email or web address] [email or web address] [email or web address]-vkgmtqdy/    Online Intergroup of AA - 121 Open AA [place] Meeting - 24/7 zoom meetings  https://aa-intergroup.org/meetings/    LOOKING FOR AN ALTERNATIVE TO 12 STEP PROGRAMS - check out:  SMART Recovery: https://www.smartrecovery.org/about-us  [facility]: https://recoverydharma.org      DETOX UNITS (USUALLY 5-7 DAYS):     [facility]: [address], [place]  [phone], call first to ensure bed availability    [facility]: [address] [facility] [address] [facility]  [phone], Option 1, call first to ensure bed availability    [facility]: [facility] [address], [facility]  [phone] (intake by appointment only)    [facility]: [address], [facility]  [phone]      INTENSIVE OUTPATIENT PROGRAMS:     [facility] (formerly known as the ABU)  [x] [phone], Option 2  [x] [address] [facility] 4th Floor, [facility] [address]  [x] https://www.[facility].org/services/[facility]-recovery-program  [x] The [facility] delivers comprehensive and collaborative treatment for alcohol and substance use disorders.  Excellent program for working professionals or anyone else seeking recovery.  [x] Requires insurance approval prior to starting program, call number above for more information.  [x] Intensive Outpatient Rehabilitation Program - M-F 9am-3pm - daily groups with psychologists and social workers, sessions with MDs 3x per week   [x] Ambulatory detox and dual diagnosis available    [facility] Intensive Outpatient Program  [x] [phone]  [x] [address] [facility] (the clinic not [facility])  [x] Call number above for more info and to check insurance requirements    [facility]  [address] [place] [address] [phone]    [facility]:  [address] [facility]  [address]?, [place], [place] [address]?, [phone]  [address] [facility]?, [place], [place] [address]?, [phone]    RESIDENTIAL REHABS (USUALLY 28 DAYS):     [facility]: [address], [phone]    [facility]: [address] , [facility]  [phone] (intake by appointment only)    [facility] (men only) [address], [place], [phone]    [facility] (Female only) [address] [place], [phone]    [facility]: [address], [place], men's program [phone], women's program [phone]    [facility]: [address], [place], [phone]    [facility]: [address], [place], [place], [phone]    [facility]: Men only, [phone], 4[address] Located within [facility] Kenny Sesay [facility]: [address], [place], [place], [phone]    [facility]: Count includes the [facility] [place], [place],  [phone]  New Location: [address] [place] [address] [address], [place] [address], [phone]    [facility]:   ?[address] [facility]. [address]?[place], [place] [address]?[phone]    [place]: [address] [place] [address], [phone]    [place]: [place], [place], [phone]     [facility]: [place], [phone]    [facility]: [address] Castaneda, [phone]    formerly [facility]: [place], [place], [phone]    [place]: [address], [phone]    [facility]: [address] [facility], [place] [address], [phone]    COMMUNITY ADDICTION CLINICS:     [facility]: [address] [facility], [address] [phone] -or- [address] [place] [address]    [facility] [place]: [address] [place]  [address]     [facility]: Clinics [phone]; Crisis [phone]    [facility]: [address] [place] [address]    [facility]: [address] [place] [address]    [facility]: [address] [place] [address],    [facility]: 2nd Floor [address] [place] [place] [address]    [facility]: [address], [place] [address]    [facility], [address] Van[place], [place] [address]    [facility]: [address] [facility], [place] [phone]  (serves youth 16-23 years old)    [facility]: [facility] [phone]    [facility]: [address] [facility], [place] [phone]    [facility]: [address] [facility] [phone]    [facility]: [address] [place], [place] [address] [facility], [address], [phone]     [facility]: [address] [facility], [phone], [phone]    RESOURCES IN OTHER [facility]:     [facility]: [address] [facility], [phone], [phone]    [facility]: [address] [facility], [address], [phone]    [facility], [address] [place] [address], [phone]    [facility]: [address] [facility], [phone]    [facility], [address] [facility], [phone] ([facility])    [facility], [address] [facility], [phone] ([facility])    [facility], [address], [place], [phone] ([facility], and [facility])    [facility], [address] , [place], [phone] ([facility])    [facility] Hotline [phone], [phone]    [facility], [address] [facility], [facility] [place], [address] [facility], [address], [facility], [address] [facility]  "[address]ec[address] [facility], 500 [facility]. [address]., [facility], [address], [place]    [facility], 401 [facility], [address]35[facility] [phone]    [facility], [address] [facility] [phone]    [facility], [address] [facility] [phone]    [facility], [address] [facility], [phone]      German SPEAKING (en español):     Información de la reunión de Alcohólicos Anónimos  [facility]erry [facility], 10:00 am  Habla español  Esta reunión está abierta y cualquiera puede asistir.    Telugu speaking Alcoholics anonymous meetings:  El "Terry [place] AA Skype" es un terry on line de Alcohólicos Anónimos en [facility]. El terry es sherri, gratuito y virtual a través de Skype Audio. El terry funciona mediante scooter llamada grupal de voz, por lo que no se utiliza la videollamada, ni se pueden hailee las imágenes o rostros de los participantes. Hace devyn años y medio abrimos el primer Terry de AA por Skype en [place], rosemary actualmente asisten personas desde [place], [place], [place], [place], [place], [place], [place], [place], [place], [place], [place] y [place], entre otros.    El terry es muy útil para los alcohólicos que residen en lugares donde no se celebran reuniones de AA, o residen en lugares donde las reuniones de AA son un número limitado de días a la semana, o para aquellos compañeros que se hayan de viaje o que, por cualquier motivo, se hayan convalecientes y no pueden desplazarse. Todos los días nos reuniones a las 21:00 (hora española)    Podéis obtener más información sobre el terry y siri sesiones en la página web [email or web address]      MENTAL HEALTH:     [facility] - Outpatient Clinic  [phone]    [facility] - General " Psychiatry Intensive Outpatient Program  [facility] (formerly known as the BMU)  [phone], option 3    [facility]  Department of Psychiatry - Dual Diagnosis Intensive Outpatient Program  [facility] (formerly known as the ABU)  [phone], option 2      COMMUNITY MENTAL HEALTH CENTERS:     [facility]  (aka [facility], [facility])  Serves [facility].  Serves uninsured patients & those with Medicaid.  Main location: [address] [place] [address] [phone]  Walk-in's available during regular business hours.  24/7 Crisis Line: [phone]    [facility]  (aka [facility], [facility])  Serves [facility].  Serves uninsured patients, those with Medicaid and some private plans.  Walk-in's available during regular business hours.  Primary care services available as well.  [facility]: [address] [facility] [place] [address];  [phone]  [place]: [address] [place] [address];  [phone]  24/7 Crisis Line: [phone]    [facility]  Serves uninsured patients & those with Medicaid, call for more info.  Primary care, pediatrics, HIV treatment, and dentistry services available as well.  Three locations.  [phone]    [facility]?Corporate Office  Serves patients with Medicaid, Medicare, and private insurance  [address]  [place],?[place] [address] [phone]    [facility]  Serves uninsured on a sliding scale, as well as Medicaid, Medicare, and private plans.  Eight locations around the [facility] area.  [phone]    [facility]  Serves uninsured patients & those with Medicaid, private insurances.  Primary care available as well.  [phone]  [address] [facility] [place]  [address]    [facility] Out[facility] Psychiatry  Serves veterans who were honorably discharged.  [address] [place] [address]  [phone]  24/7 Veterans Crisis Line: 1-856.266.1500 (Press 1)    If you have private insurance and need to find a specialist, please contact your insurance network to request a list of providers covered by your benefits.      MENTAL HEALTH/ADDICTIVE DISORDERS:     AA ([phone]), NA ([phone])   National Suicide Prevention Lifeline- Call 1-889.433.9798 Available 24 hours everyday  [facility] [phone]; Crisis Line [phone] - Call for options A-F:  Intensive Outpatient Treatment/ Day programs   [facility], please contact   [facility], please contact [phone] or [phone] to speak with an admissions counselor.  [facility] (Methadone Maintenance)   [address] [place] [address], [phone]  [address] [place] [address] [phone]  [facility], [address] [place] [address], [phone]  [facility] [phone]  [facility] please contact [phone]  [facility], [address] [facility], 4th [place] [address] Phone: [phone]   Acer  Maikel Office: [place] [address], [phone]  [place] Office: [address] [facility], [address], [place] [address], [phone]  [place] Office: [address] [facility] Hospital[facility] [place] [address] [phone]    Outpatient Substance Abuse Treatment   [facility] (Methadone Maintenance)   [address] [place] [address], [phone]  [address] [place] [address] [phone]  [facility], [address] [place] [address], [phone]  Acer  [place] Office: [address], [place] [address], [phone]  [place] Office: [address] [facility] [address], [place] [address],  [phone]  [place] Office: [address] [place] [address] [phone]  [facility], [address] [place] [address] [phone]   [facility], [address] [facility], [address], [phone]  [facility]over, [address] [place], [place] [phone]    Residential Substance Abuse Treatment   [facility] 1125 [facility], [phone] or [phone]  [facility], [address] [facility], [phone]  [facility] (men only) [address] [place] [address], [phone]  [facility] at [facility] (women only) [address] [place] [address] [phone]  [facility], [address] [place] [address] [phone]  Located within [facility] (women only), intakes at 4[address] [facility], [phone]  [facility] (7-day program, $100, [address], [phone], [phone], [phone])  [facility] (Men only, [phone]), [address], Kenny (Vets*/Non-Vets)  [facility] (Men only, $400/month program fee) [address] [facility] [place], [phone]  [facility] (Women over age 39 only), [address] [facility], [phone]    Out of Area:    [place], [address] [facility], [place], [place] ([phone])  [facility] Recovery Program (men only), [address] [facility] [place] [address], [phone]  [facility], [address] [place], [place] [phone])  [place], [address] [place] [address] Dr. Solis, [place] ([phone])  [facility], [address] [facility], [phone]  [facility] (Women only, has to have mental illness, can be homeless or substance abuser), [phone]        DOMESTIC VIOLENCE RESOURCES:     Advocacy  [facility] ([facility])  [address] [address]    [facility] [place], [facility] ? [phone]  Services provided: emergency shelter, individual advocacy,  information and referrals, group support, children's program, medical advocacy, forensic medical exams, primary care, legal assistance, counseling, safety planning, and caregiver support    [facility] HEALING AND EMPOWERMENT [place]  Confidential location  [facility] ? [phone]  Services provided: short term emergency shelter, all services provided are free of charge    [facility] CENTERS FOR COMMUNITY ADVOCACY  Multiple locations in [facility], [facility], [place], and [facility] ([place], [place], and [place])    [place] ? [phone]  Services provided: emergency shelter, individual advocacy, information and referrals, group support, children's program, medical advocacy, legal assistance in obtaining restraining orders, counseling, safety planning, and caregiver support    [facility]   Emergency Shelter   [phone]  Emergency Services ,Legal and Financial Assistance Services ,Housing Services ,Support Services     [facility]   [phone]  Emergency Services ,Counseling Services , Housing Services ,Support Services ,Children's Services     WOMEN WITH A VISION  [address] [place] [address]  WWAV ? [phone]  Services provided: advocacy, health education and supportive services, specializing in free healing services for marginalized groups, including LGBTQ individuals and sex workers    SEXUAL TRAUMA AWARENESS AND RESPONSE (STAR)  [address] [place] [address]    STAR ? [phone]-PJZG  Services provided: individual advocacy, information and referrals, group support, medical advocacy, legal assistance, counseling, and safety planning for survivors of sexual assault    [facility] ([facility])  [address] [place] [address]  [facility] Forensic Program ? [phone]  Services provided: free forensic medical exams for sexual assault and domestic violence, which can be performed up to 5 days  after an incident. It is not necessary to make a police report to receive a forensic medical exam    Legal  [facility]  [address], [place] [address]  Project S[facility] ? [phone]  Services provided: free emergency legal representation for survivors of doemstic violence residing in [facility]. Legal services may include temporary restraining orders, temporary child support, custody, and use of property    [facility] LEGAL SERVICES (SLLS)  [address] [facility], [place] [address]  SLLS ? [phone]  Services provided: free legal representation for survivors of domestic violence residing in [facility]. Legal services may include temporary child support, custody, and divorce      HOTLINES:     [facility] DOMESTIC VIOLENCE HOTLINE  [phone]    Services provided: free and confidential hotline for victims and survivors of domestic violence. All calls will be routed to a domestic violence service provided in the victim or survivor's area    NATIONAL HUMAN TRAFFICKING HOTLINE  (736) 198-2264    Services provided: national anti-trafficking hotline serving victims and survivors of human trafficking. Provides information about local resources, and access to safe space to report tips, seek services, and ask for help    VIA LINK  211 or [phone]) [phone]    Service provided: counselors can provide crisis counseling. Counselors can also provide information and referrals to programs which can help with needs such as food, shelter, medical care, financial assistance, mental health services, substance abuse treatment, senior services, childcare, and more      HOMELESS SHELTERS:      Homeless shelters  [facility]  Emergency shelter for individuals and families  [address]  [phone]  [facility]  Emergency shelter for men only  Meals daily 6am, 2pm, & 6pm  Clothing, case management M-F by appointment (ID/job/housing/legal assistance), mail  [address]    [phone]  [facility] [place]  Emergency shelter for men  [address] [facility]  [phone]  Emergency shelter for women  [address]   [phone]  Breakfast & lunch daily, dinner M-F  Case management, job counseling services   [facility] [place]  Emergency shelter for teens and young adults up to 22yo  [address]   [phone]  [facility]  Emergency shelter for women over 17yo and their kids  [address] [place] [address]  [phone]  [facility]  Day program, meals M-F 1PM (arrive early)  Showers, laundry, hygiene kits, showers, phones, , notary services, case management, ID assistance  [address]   [phone] M-F 8am-2:30pm  [employer]  Day program  MTW 7:30am-3:30pm,  8:30am-3:30pm  Crisis intervention, employment assistance, food/clothing, hygiene kits, bus tokens, mail  [address] [facility] [address]  [phone]  [facility]  Mobile outreach for homeless persons in [facility]  [phone]  [facility]  Primary healthcare, case management, dental services, TB placement  Call ahead  [address] [facility] [address]  [phone]  [facility]  Connects homeless people with their loved ones in other cities by providing transportation costs   [phone]      [place] RESOURCES:     [place] [facility] Mental Health Crisis Response Team:    [place] [facility] ([place], [place], and [facility]) ([facility])  [phone]      Outpatient Mental Health & Addiction Clinic Resources for both [facility]:    [facility] Mental Healthcare Resources  Website: www.mhr.org  Main Number: 605-661-8833    [facility] ([facility])  [address] ([address] [place] [address]  [phone]    [facility] ([facility])  [address] ([address] [facility]) [facility]  [place] [address]  [phone]    [employer]  [address] ([address]) [place] [address]  [phone]    [employer]  [address] ([address] [place] [address]) [place] [address]  [phone]      Addiction Treatment Resources for both [facility] and [facility]:    [facility] (Detox, Residential, PHP, IOP, and Aftercare Programs)  [address] [place] [address]  [phone]    [facility] (Residential, IOP, Transitional Living, and Aftercare Programs)  [address] [facility] [place] [address]  [phone]    [facility] (Inpatient, Residential, Detox, IOP, Outpatient, and Aftercare Programs)  [address] [place] [address] [place] [address]  [phone] or toll free at [phone]      Outpatient Mental Health Psychotherapy Resources for both [facility] and [facility]:    Nimo Orozco, [facility]  [address]  [place] [address]  [phone]  Specialties: Depression, Anxiety, and Life Transitions    Jonelle Sarmiento, PhD  [address] [place] [address] [address]  [address]  [place] [address]  [phone]  Specialties: Testing and Evaluation, Education and Learning Disabilities, and ADHD    Sanjuanita Gama, [facility] [address] [place] [address], [place] [address]  [phone]  Specialties: Obsessive-Compulsive (OCD), Depression, and Relationship Issues    Celine Ny L[facility] [address] [place] [address]  [phone]  Specialties: Trauma & PTSD, Mood Disorders, and Anxiety    Celine Kay, PhD, [facility]  [facility] [address] [place] [address]  [phone]  Specialties: Family Conflict, Child, and Relationship Issues    Irene Melendrez, [facility] [place] [address] [phone]  Specialties: Anxiety, Depression, and Anger Management        IN CASE OF SUICIDAL THINKING,  call the National Suicide Hotline Number: 988    988 Suicide & Crisis Lifeline: 988 , 8-988-487-TALK 8255)  Provides 24/7, free and confidential support for people in distress, prevention and crisis resources for you or your loved ones, and best practices for professionals.    Call, text or chat.  https://988[employer].org

## 2024-06-04 NOTE — PLAN OF CARE
went to meet with patient this morning. Patient is a possible discharge today. No anticipated discharge needs. PCP follow-up scheduled. I did speak with PCP office and they do have a Behavioral Health Specialist and addiction program at their clinic that his PCP can refer him too. Patient reports to me he no longer follows with any Psychiatrist. Patient confirms he has a ride home at discharge. Patient encouraged to call with any questions or concerns.  will continue to follow patient through transitions of care and assist with any discharge needs.     MD awaiting repeat VS to discharge patient. Nursing staff aware. Mental Health information placed on patient's AVS.     Patient Contacts    Name Relation Home Work Mobile   Preeti Moffett   [phone]     Future Appointments   Date Time Provider Department Center   6/13/2024  3:00 PM [facility] Ari[facility] Joseph Jr., NP PCP - General Family Medicine [phone] [address] [place] [address] [place] [id number]      Next Steps: Follow up on 6/11/2024  Instructions: at 09:30 am, Primary Care Physician Fax#[phone] Please bring ID, insurance card, medications, and discharge papers. --Please have your PCP refer you to addiction program within PCP office.        06/04/24 1040   Final Note   Assessment Type Final Discharge Note   Anticipated Discharge Disposition Home   Hospital Resources/Appts/Education Provided Appointments scheduled and added to AVS   Post-Acute Status   Discharge Delays None known at this time     Hayley Dumont RN    [phone]

## 2024-06-04 NOTE — ASSESSMENT & PLAN NOTE
This patient has hyperkalemia which is uncontrolled. We will monitor for arrhythmias with EKG or continuous telemetry. We will treat the hyperkalemia with Calcium gluconate and Nebulized albuterol sulfate. The likely etiology of the hyperkalemia is FREDERICK.  The patients latest potassium has been reviewed and the results are listed below  Recent Labs   Lab 06/04/24  0340   K 3.8       - s/p albuterol and calcium gluconate at OSH

## 2024-06-04 NOTE — HOSPITAL COURSE
44 year old male with a PMH of HTN, hepatitis, polysubstance abuse who was admitted after being found down for unknown duration of time. He was sent to ED at outside facility initially. His vital signs on admission were heart rate of 128, respiratory rate of 20, blood pressure 167/111, afebrile. He was on oxygen on arrival 100% via Ventimask. He was intubated and placed on ACVC with tidal volumes of 450, respiratory rate of 20, peep of 6.1. Given a previous presentation for drug overdose with opiates, he was empirically given naloxone. He was breathing over the vent and labs were collected. His initial ABG showed a pH of 6.986, pCO2 of 81, PO2 of 250, bicarbonate of 19.4 saturating 99% on 100% FiO2. A CBC showed a leukocytosis of 37.98 He was given empiric abx and started on bicarbonate drip which helped correct his acidosis.     After transfer to our hospital, he self extubated the next day. He was seen by psychiatry and patient was not intersted in counseling or medical treatment at this time. He spiked a fever up to 101.3 but no infectious source. Blood cultures were negative from admission and repeat were drawn prior to discharge. He was started on CAP coverage for possible PNA. On day of discharge, patient was feeling well and denied any symptoms. He was discharged on Levaquin to complete 5 days of antibiotics.

## 2024-06-04 NOTE — ASSESSMENT & PLAN NOTE
Pt was found down on 6/1 around 7 PM, unclear how long he was unconscious.   - Intubated for airway protection  - UDS positive for benzos, cocaine and opiates - suspect likely etiology is overdose  - CTH normal  - infecitous workup unremarkable   - ammonia level elevated at 105

## 2024-06-04 NOTE — PLAN OF CARE
Problem: Adult Inpatient Plan of Care  Goal: Plan of Care Review  Outcome: Progressing   Updated care plan. Chart reviewed.

## 2024-06-04 NOTE — ASSESSMENT & PLAN NOTE
Patient with acute kidney injury/acute renal failure likely due to  unknown cause  FREDERICK is currently stable. Baseline creatinine  1.0  - Labs reviewed- Renal function/electrolytes with Estimated Creatinine Clearance: 94 mL/min (based on SCr of 1.2 mg/dL). according to latest data. Monitor urine output and serial BMP and adjust therapy as needed. Avoid nephrotoxins and renally dose meds for GFR listed above.  - Cr 1.8 at admission. Likely due to possible shock vs toxic injury vs rhabdo  - CK mildly elevated at 462  - s/p NaHCO3 fluid rescucitation  - Cr improved to 1.2

## 2024-06-04 NOTE — ASSESSMENT & PLAN NOTE
UDS positive for benzo, cocaine and opiates  - psych was consulted inpatient and pt declined counseling or treatment for substance use at this time

## 2024-06-04 NOTE — DISCHARGE SUMMARY
"[facility] Medicine  Discharge Summary      Patient Name: Nick Moffett Jr  MRN: [id number]  TENISHA: [id number]  Patient Class: IP- Inpatient  Admission Date: 6/1/2024  Hospital Length of Stay: 3 days  Discharge Date and Time:  06/04/2024 3:15 PM  Attending Physician: Jovi Norton MD   Discharging Provider: Jovi Norton MD  Primary Care Provider: Arias Paulino Jr., NP    Primary Care Team: Networked reference to record PCT     HPI:   Per  HPI:  "Patient is a 44-year-old male with past medical history significant for hepatitis, previous drug overdose, hypertension and polysubstance abuse who is being brought in by ambulance after being found unresponsive at home by his mother.  He was down for an unknown amount of time and had been talking to his girlfriend earlier in the night.  At approximately 7:00 p.m., he disconnected and his girlfriend believes that he had gone to bed.  His mother came home and found him in his bed completely unresponsive to stimuli.  There was some occasional generalized posturing of unclear significance but has no history of seizures.  No reported bowel or bladder incontinence.  His GCS on arrival was 3 and he was subsequently intubated with RSI using etomidate and succinylcholine.  He received 5 mg of IV midazolam in route for suspected seizure.     His vital signs on admission were heart rate of 128, respiratory rate of 20, blood pressure 167/111, afebrile.  He was on oxygen on arrival 100% via Ventimask.  He was intubated and placed on ACVC with tidal volumes of 450, respiratory rate of 20, peep of 6.1.  Given a previous presentation for drug overdose with opiates, he was empirically given naloxone. He was breathing over the vent and labs were collected.  His initial ABG showed a pH of 6.986, pCO2 of 81, PO2 of 250, bicarbonate of 19.4 saturating 99% on 100% FiO2.  A CBC showed a leukocytosis of 37.98, hemoglobin 15.0, platelets of 466.  A metabolic panel " "showed sodium of 137, potassium 6.0 (shifted with albuterol), chloride of 101, CO2 of 15 with a BUN and creatinine of 13 and 1.8 respectively which is a proximally double his baseline of 0.9-1.0, glucose was 289, phosphorus was 9.3, magnesium 2.4, T bili is 0.2, AST and ALT are 36 and 29 respectively.  Ammonia was 103, initial lactate was greater than 12.  Troponin and CPK within tolerable limits but slightly elevated CPK at 462 indicating possible developing nontraumatic rhabdo.  Drug screen was positive for benzos, cocaine and opiates.  Previous historic results of also been positive for fentanyl back in February.     He is being transferred for ICU level care as well as neurology evaluation.  May require an MRI if neurological results do not improve.  Additionally may have progression of end-organ damage in the setting of prolong hypopnea given his initial ABG."    On arrival, pt is arousable to voice.        * No surgery found *      Hospital Course:   44 year old male with a PMH of HTN, hepatitis, polysubstance abuse who was admitted after being found down for unknown duration of time. He was sent to ED at outside facility initially. His vital signs on admission were heart rate of 128, respiratory rate of 20, blood pressure 167/111, afebrile. He was on oxygen on arrival 100% via Ventimask. He was intubated and placed on ACVC with tidal volumes of 450, respiratory rate of 20, peep of 6.1. Given a previous presentation for drug overdose with opiates, he was empirically given naloxone. He was breathing over the vent and labs were collected. His initial ABG showed a pH of 6.986, pCO2 of 81, PO2 of 250, bicarbonate of 19.4 saturating 99% on 100% FiO2. A CBC showed a leukocytosis of 37.98 He was given empiric abx and started on bicarbonate drip which helped correct his acidosis.     After transfer to our hospital, he self extubated the next day. He was seen by psychiatry and patient was not intersted in counseling or " medical treatment at this time. He spiked a fever up to 101.3 but no infectious source. Blood cultures were negative from admission and repeat were drawn prior to discharge. He was started on CAP coverage for possible PNA. On day of discharge, patient was feeling well and denied any symptoms. He was discharged on Levaquin to complete 5 days of antibiotics.      Goals of Care Treatment Preferences:  Code Status: Full Code      Consults:   Consults (From admission, onward)          Status Ordering Provider     Inpatient consult to Psychiatry  Once        Provider:  (Not yet assigned)    Completed VIPIN COBB            Neuro  Altered mental status  Pt was found down on 6/1 around 7 PM, unclear how long he was unconscious.   - Intubated for airway protection  - UDS positive for benzos, cocaine and opiates - suspect likely etiology is overdose  - CTH normal  - infecitous workup unremarkable   - ammonia level elevated at 105      Pulmonary  On mechanically assisted ventilation  Intubated for airway protection. Self extubated 6/3    Renal/  Hyperkalemia  This patient has hyperkalemia which is uncontrolled. We will monitor for arrhythmias with EKG or continuous telemetry. We will treat the hyperkalemia with Calcium gluconate and Nebulized albuterol sulfate. The likely etiology of the hyperkalemia is FREDERICK.  The patients latest potassium has been reviewed and the results are listed below  Recent Labs   Lab 06/04/24  0340   K 3.8       - s/p albuterol and calcium gluconate at OSH      Lactic acid acidosis  Lactic acid >12  - initial blood gas: 6.98/81.5/253 -> 7.3/47/209   - treated with bicarb drip with improvement      Acute renal failure  Patient with acute kidney injury/acute renal failure likely due to  unknown cause  FREDERICK is currently stable. Baseline creatinine  1.0  - Labs reviewed- Renal function/electrolytes with Estimated Creatinine Clearance: 94 mL/min (based on SCr of 1.2 mg/dL). according to latest data.  Monitor urine output and serial BMP and adjust therapy as needed. Avoid nephrotoxins and renally dose meds for GFR listed above.  - Cr 1.8 at admission. Likely due to possible shock vs toxic injury vs rhabdo  - CK mildly elevated at 462  - s/p NaHCO3 fluid rescucitation  - Cr improved to 1.2     Oncology  Leukocytosis  WBC 38, resolved  - UA unremarkable   - CXR with intersitial opacities  - blood cultures NGTD  - vancomycin and cefepime -> ceftriaxone/azithromycin -> Levaquin at discharge    Other  Tobacco dependency  Dangers of cigarette smoking were reviewed with patient in detail. Patient was Counseled for 3-10 minutes. Nicotine replacement options were discussed. Nicotine replacement was discussed- prescribed    Polysubstance abuse  UDS positive for benzo, cocaine and opiates  - psych was consulted inpatient and pt declined counseling or treatment for substance use at this time      Final Active Diagnoses:    Diagnosis Date Noted POA    Altered mental status [R41.82] 06/01/2024 Unknown    Tobacco dependency [F17.200] 06/04/2024 Unknown    Polysubstance abuse [F19.10] 06/02/2024 Unknown    Leukocytosis [D72.829] 06/02/2024 Unknown    Lactic acid acidosis [E87.20] 06/01/2024 Unknown    Hyperkalemia [E87.5] 06/01/2024 Unknown    On mechanically assisted ventilation [Z99.11] 02/06/2024 Not Applicable    Acute renal failure [N17.9] 02/06/2024 Yes    Schizophrenia [F20.9] 10/06/2023 Yes      Problems Resolved During this Admission:       Discharged Condition: good    Disposition:     Follow Up:   Follow-up Information       Arias Paulino Jr., NP Follow up on 6/11/2024.    Specialty: Family Medicine  Why: at 09:30 am, Primary Care Physician  Fax#[phone]  Please bring ID, insurance card, medications, and discharge papers.  --Please have your PCP refer you to addiction program within PCP office.  Contact information:  [place] [address] [phone]                           Patient Instructions:    No discharge procedures on file.    Significant Diagnostic Studies: Labs: BMP:   Recent Labs   Lab 06/03/24  1712 06/04/24  0340   GLU  --  111*   NA  --  138   K  --  3.8   CL  --  101   CO2  --  26   BUN  --  12   CREATININE 1.3 1.2   CALCIUM  --  9.4    and CBC   Recent Labs   Lab 06/04/24  0340   WBC 8.51   HGB 13.0*   HCT 38.6*        Radiology: X-Ray: CXR: X-Ray Chest 1 View (CXR): No results found for this visit on 06/01/24.    Pending Diagnostic Studies:       None           Medications:  Reconciled Home Medications:      Medication List        START taking these medications      levoFLOXacin 750 MG tablet  Commonly known as: LEVAQUIN  Take 1 tablet (750 mg total) by mouth once daily.            CHANGE how you take these medications      * naloxone 4 mg/actuation Spry  Commonly known as: NARCAN  1 spray (4 mg total) by Nasal route once. for 1 dose  What changed:   how much to take  how to take this  when to take this  additional instructions     * naloxone 4 mg/actuation Spry  Commonly known as: NARCAN  4mg by nasal route as needed for opioid overdose; may repeat every 2-3 minutes in alternating nostrils until medical help arrives. Call 911  What changed: Another medication with the same name was changed. Make sure you understand how and when to take each.           * This list has 2 medication(s) that are the same as other medications prescribed for you. Read the directions carefully, and ask your doctor or other care provider to review them with you.                CONTINUE taking these medications      cloNIDine 0.1 MG tablet  Commonly known as: CATAPRES  Take 0.1 mg by mouth once daily.              Indwelling Lines/Drains at time of discharge:   Lines/Drains/Airways       None                   Time spent on the discharge of patient: 60 minutes         Jovi Norton MD  Department of Hospital Medicine  [facility]

## 2024-06-04 NOTE — ASSESSMENT & PLAN NOTE
Lactic acid >12  - initial blood gas: 6.98/81.5/253 -> 7.3/47/209   - treated with bicarb drip with improvement

## 2024-06-04 NOTE — PLAN OF CARE
06/04/24 1653   AVS Confirmation   Discharge instructions and AVS given to and reviewed with patient and/or significant other. Yes         AVS printed and handed to patient by bedside nurse. VN reviewed discharge instructions with patient using teachback method.  Allowed time for questions, all questions answered.  Patient verbalized complete understanding of discharge instructions and voices no concerns. Discharge instructions complete. Bedside delivery complete. Bedside nurse notified.

## 2024-06-07 LAB
BACTERIA BLD CULT: NORMAL
BACTERIA BLD CULT: NORMAL

## 2024-06-09 LAB
BACTERIA BLD CULT: NORMAL
BACTERIA BLD CULT: NORMAL

## 2024-06-11 LAB
OHS QRS DURATION: 106 MS
OHS QTC CALCULATION: 485 MS

## 2024-06-13 ENCOUNTER — TELEPHONE (OUTPATIENT)
Dept: SMOKING CESSATION | Facility: CLINIC | Age: 44
End: 2024-06-13
Payer: MEDICAID

## 2024-06-13 NOTE — TELEPHONE ENCOUNTER
Successful contact with patients family member in regards to missing today's tobacco cessation intake session. Left a message with contact information to either complete the session or reschedule.

## 2024-08-25 ENCOUNTER — HOSPITAL ENCOUNTER (EMERGENCY)
Facility: OTHER | Age: 44
Discharge: HOME | End: 2024-08-25
Attending: EMERGENCY MEDICINE
Payer: MEDICAID

## 2024-08-25 VITALS
DIASTOLIC BLOOD PRESSURE: 99 MMHG | OXYGEN SATURATION: 99 % | SYSTOLIC BLOOD PRESSURE: 198 MMHG | RESPIRATION RATE: 26 BRPM | HEART RATE: 124 BPM

## 2024-08-25 DIAGNOSIS — R68.89 NOT FEELING GREAT: ICD-10-CM

## 2024-08-25 DIAGNOSIS — T50.904A OVERDOSE OF UNDETERMINED INTENT, INITIAL ENCOUNTER: Primary | ICD-10-CM

## 2024-08-25 PROCEDURE — 99291 CRITICAL CARE FIRST HOUR: CPT

## 2024-08-25 RX ORDER — NALOXONE HCL 0.4 MG/ML
VIAL (ML) INJECTION
Status: DISPENSED
Start: 2024-08-25 | End: 2024-08-26

## 2024-08-25 RX ORDER — NALOXONE HYDROCHLORIDE 1 MG/ML
INJECTION INTRAMUSCULAR; INTRAVENOUS; SUBCUTANEOUS
Status: DISPENSED
Start: 2024-08-25 | End: 2024-08-26

## 2024-08-25 NOTE — ED PROVIDER NOTES
"Encounter Date: 8/25/2024       History     Chief Complaint   Patient presents with    Drug Overdose     43 yo male reports to ed in his friends car having an overdose. RR called on pt. Security gave one dose of narcan on arrival to car. Pt non-responsive and diaphoretic when pulled from car with palpable pulse. Responsive to narcan. Pt denies drug use, states "I was just chillin". 87% on 3L NC. 100% on NRB     This is a 44-year-old man who presented in overt distress after being dropped off at the hospital in an unresponsive state.  Received Narcan from [facility] prior to transport to emergency department from front steps.    The history is provided by the patient and a friend.     Review of patient's allergies indicates:  No Known Allergies  Past Medical History:   Diagnosis Date    Hypertension      Past Surgical History:   Procedure Laterality Date    APPENDECTOMY      difficult airway       No family history on file.  Social History     Tobacco Use    Smoking status: Every Day     Current packs/day: 1.00     Average packs/day: 1 pack/day for 27.9 years (27.9 ttl pk-yrs)     Types: Cigarettes     Start date: 10/1/1996    Smokeless tobacco: Never    Tobacco comments:     Pt is a 1 pk/day cigarette smoker x 28 yrs.He states ready to quit smoking. Ambulatory referral to Smoking Cessation clinic following hospital discharge.    Substance Use Topics    Alcohol use: Never    Drug use: Never     Review of Systems    Physical Exam     Initial Vitals   BP Pulse Resp Temp SpO2   08/25/24 1312 08/25/24 1312 08/25/24 1313 -- 08/25/24 1312   (!) 198/99 (!) 124 (!) 26  99 %      MAP       --                Physical Exam  Constitutional:  Profusely diaphoretic somnolent 44-year-old man  Eyes:  Pupils pinpoint  ENT       Head: Normocephalic, atraumatic.       Nose: Normal external appearance        Mouth/Throat: no strigulous respirations   Hematological/Lymphatic/Immunilogical: no visible lymphadenopathy "   Cardiovascular:  Rapid rate,   Respiratory:  Minimal respiratory effort  Gastrointestinal: non distended   Musculoskeletal: Normal range of motion in all extremities. No obvious deformities or swelling.  Neurologic:  Somnolent, responds to noxious stimuli  Skin:  Profoundly diaphoretic  Psychiatric: Mood and affect are normal.   ED Course   Critical Care    Date/Time: 8/25/2024 1:06 PM    Performed by: Tolu Suárez MD  Authorized by: Tolu Suárez MD  Direct patient critical care time: 25 minutes  Additional history critical care time: 3 minutes  Ordering / reviewing critical care time: 3 minutes  Documentation critical care time: 5 minutes  Total critical care time (exclusive of procedural time) : 36 minutes  Critical care time was exclusive of separately billable procedures and treating other patients and teaching time.  Critical care was necessary to treat or prevent imminent or life-threatening deterioration of the following conditions: toxidrome.  Critical care was time spent personally by me on the following activities: blood draw for specimens, development of treatment plan with patient or surrogate, interpretation of cardiac output measurements, evaluation of patient's response to treatment, examination of patient, obtaining history from patient or surrogate, ordering and performing treatments and interventions, pulse oximetry and re-evaluation of patient's condition.        Labs Reviewed - No data to display       Imaging Results    None          Medications - No data to display  Medical Decision Making  Differential diagnosis-overdose, seizure, cardiac arrest, hypertensive urgency, hypertensive emergency   On arrival this gentleman was profusely diaphoretic after administration of Narcan minimally responsive somnolent.    IV was emergently obtained, Narcan was administered.  He had return to consciousness.  Did require oxygen via nasal cannula to maintain an oxygen saturation greater  than 90%.    Markedly hypertensive after administration of Narcan.  He was oriented to person place time situation.    Friend who had dropped patient off noted that he believes he had used fentanyl.    Advised patient that while in the emergency department for 30 minutes he had not been observed for a period of time which was appropriate and he had very concerning vital signs.  He was adamant that he would not stay in the emergency department.  I advised him that he may die upon leaving the emergency department.  That he may require significant support.  He further refused monitoring, removed leads, removed monitors, stood out of bed.    Offered further testing, observation, encouraged further observation.    Advised regarding potential significant outcomes including permanent disability death respiratory depression.    Patient signed against medical advice paperwork and left the hospital.  Encouraged him to return to the emergency department immediately in case he had any further concerns or wish to pursue further treatment.    Problems Addressed:  Not feeling great: acute illness or injury  Overdose of undetermined intent, initial encounter: acute illness or injury    Amount and/or Complexity of Data Reviewed  Independent Historian: friend     Details: Friend noted that he believe that this patient had used fentanyl prior to arrival  External Data Reviewed: labs, radiology, ECG and notes.     Details: History of substance abuse and multiple ER encounters    Risk  OTC drugs.  Prescription drug management.  Drug therapy requiring intensive monitoring for toxicity.  Decision regarding hospitalization.  Diagnosis or treatment significantly limited by social determinants of health.                                      Clinical Impression:  Final diagnoses:  [T50.904A] Overdose of undetermined intent, initial encounter (Primary)  [R68.89] Not feeling great          ED Disposition Condition    AMA Serious                 Tolu Suárez MD  08/26/24 1300

## 2024-08-25 NOTE — ED TRIAGE NOTES
"45 yo male reports to ed in his friends car having an overdose. RR called on pt. Security gave one dose of narcan on arrival to car. Pt non-responsive and diaphoretic when pulled from car with palpable pulse. Responsive to narcan. Pt denies drug use, states "I was just chillin". 87% on 3L NC. 100% on NRB. Pt verbally aggressive with staff stating he doesn't need to be here and he's going to leave within 10 min of arrival.   "

## 2024-08-25 NOTE — ED NOTES
Pt refused to stay for observation. Pt spoke to MD, who explained risks to patient at bedside. Pt chose to leave AMA at this time. AMA paperwork signed by patient before he left on his own accord. Pt ambulatory with steady gait to EMS doors.

## 2024-09-02 PROBLEM — N17.9 ACUTE RENAL FAILURE: Status: RESOLVED | Noted: 2024-02-06 | Resolved: 2024-09-02
